# Patient Record
Sex: FEMALE | Race: WHITE | URBAN - METROPOLITAN AREA
[De-identification: names, ages, dates, MRNs, and addresses within clinical notes are randomized per-mention and may not be internally consistent; named-entity substitution may affect disease eponyms.]

---

## 2017-01-11 DIAGNOSIS — N82.0 VESICOVAGINAL FISTULA: Primary | ICD-10-CM

## 2017-01-16 ENCOUNTER — TELEPHONE (OUTPATIENT)
Dept: SURGERY | Facility: CLINIC | Age: 57
End: 2017-01-16

## 2017-01-20 ENCOUNTER — ANESTHESIA EVENT (OUTPATIENT)
Dept: SURGERY | Facility: CLINIC | Age: 57
DRG: 329 | End: 2017-01-20
Payer: COMMERCIAL

## 2017-01-23 ENCOUNTER — ALLIED HEALTH/NURSE VISIT (OUTPATIENT)
Dept: SURGERY | Facility: CLINIC | Age: 57
End: 2017-01-23

## 2017-01-23 RX ORDER — SODIUM CHLORIDE 9 MG/ML
1000 INJECTION, SOLUTION INTRAVENOUS DAILY PRN
COMMUNITY

## 2017-01-23 ASSESSMENT — LIFESTYLE VARIABLES: TOBACCO_USE: 1

## 2017-01-23 NOTE — MR AVS SNAPSHOT
After Visit Summary   1/23/2017    Tammy Borges    MRN: [id number]           Patient Information     Date Of Birth          1960        Visit Information        Provider Department      1/23/2017 2:00 PM Rn, [facility]        Care Instructions      Using an Incentive Spirometer  Soon after your surgery, a nurse or therapist will teach you breathing exercises. These keep your lungs clear, strengthen your breathing muscles, and help prevent complications.  The exercises include doing a deep-breathing exercise using a device called an incentive spirometer.  To do these exercises, you will breathe in through your mouth and not your nose. The incentive spirometer only works correctly if you breathe in through your mouth.  Four steps to clear lungs     Deep breathing expands the lungs, aids circulation, and helps prevent pneumonia.   1. Exhale normally.    Relax and breathe out.  2. Place your lips tightly around the mouthpiece.    Make sure the device is upright and not tilted.  3. Inhale as much air as you can through the mouthpiece (don't breath through your nose).    Inhale slowly and deeply.    Hold your breath long enough to keep the balls or disk raised for at least 3 seconds.    If you re inhaling too quickly, your device may make a tone. If you hear this tone, inhale more slowly.  4. Repeat the exercise regularly.    Do this exercise every hour while you're awake, or as your health care provider instructs.    You will also be taught coughing exercises and be asked to do them regularly on your own.    1926-5422 [employer]. [address] [place] [address]. All rights reserved. This information is not intended as a substitute for professional medical care. Always follow your healthcare professional's instructions.    AFTER YOUR SURGERY  Breathing exercises   Breathing exercises help you recover faster. Take deep breaths and let the air out  slowly. This will:     Help you wake up after surgery.    Help prevent complications like pneumonia.  Preventing complications will help you go home sooner.   We may give you a breathing device (incentive spirometer) to encourage you to breathe deeply.   Nausea and vomiting   You may feel sick to your stomach after surgery; if so, let your nurse know.    Pain control:  After surgery, you may have pain. Our goal is to help you manage your pain. Pain medicine will help you feel comfortable enough to do activities that will help you heal.  These activities may include breathing exercises, walking and physical therapy.   To help your health care team treat your pain we will ask: 1) If you have pain  2) where it is located 3) describe your pain in your words  Methods of pain control include medications given by mouth, vein or by nerve block for some surgeries.  We may give you a pain control pump that will:  1) Deliver the medicine through a tube placed in your vein  2) Control the amount of medicine you receive  3) Allow you to push a button to deliver a dose of pain medicine  Sequential Compression Device (SCD) or Pneumo Boots:  You may need to wear SCD S on your legs or feet. These are wraps connected to a machine that pumps in air and releases it. The repeated pumping helps prevent blood clots from forming.   Preparing for Your Surgery      Name:  Tammy Borges   MRN:  [id number]   :  1960   Today's Date:  2017     Arriving for surgery:  Surgery date: 17  Surgery time:  0730 AM  Arrival time:  0530 AM  Please come to:       [facility] [address] [place] [address]  [address]    [facility] parking is available in front of the hospital from 5:15 am to 8:00 pm    -  Stop at the Information Desk in the lobby    -   Inform the information person that you are here for surgery. An escort to 3c will be provided. If you would not like an escort, please proceed to 3C  on the 3rd floor. [phone]     What can I eat or drink?  -  You may have solid food or milk products until 8 hours prior to your surgery. 11 PM Tuesday  -  You may have water, apple juice or 7up/Sprite until 2 hours prior to your surgery. 0530 AM Wednesday    Which medicines can I take?  -  Do NOT take these medications in the morning, the day of surgery:  NA    -  Please take these medications the day of surgery:  Scheduled meds.    How do I prepare myself?  -  Take two showers: one the night before surgery; and one the morning of surgery.         Use Scrubcare or Hibiclens to wash from neck down.  You may use your own shampoo and conditioner. No other hair products.   -  Do NOT use lotion, powder, deodorant, or antiperspirant the day of your surgery.  -  Do NOT wear any makeup, fingernail polish or jewelry.  -  Begin using Incentive Spirometer 1 week prior to surgery.  Use 4 times per day, up to 5-10 breaths each time.  Bring Incentive Spirometer to hospital.  -Do not bring your own medications to the hospital, except for inhalers and eye drops.  -  Bring your ID and insurance card.    Questions or Concerns:  If you have questions or concerns, please call the  Preoperative Assessment Center, Monday-Friday 7AM-7PM:  [phone]                        Follow-ups after your visit        Your next 10 appointments already scheduled     Jan 23, 2017  3:30 PM   (Arrive by 3:15 PM)   PAC Anesthesia Consult with  Pac Anesthesiologist   [facility] Preoperative Assessment Center ([facility])    [address] [facility] [address] [facility]  4th [facility] [id number] [phone]            Jan 25, 2017   Procedure with Alban Coats MD   [facility] [place], Same Day Surgery (--)    [address] [facility] [id number] [phone]            Mar 06, 2017  8:30 AM   (Arrive by 8:15 AM)   Post-Op with Aman Collier DO   [facility] Inst for Prostate and Urologic Cancers ([facility])    [address] [facility] [facility]  4th [facility] [address] [phone]              Who to contact     Please call your clinic at [phone] to:    Ask questions about your health    Make or cancel appointments    Discuss your medicines    Learn about your test results    Speak to your doctor   If you have compliments or concerns about an experience at your clinic, or if you wish to file a complaint, please contact [facility] Patient Relations at [phone] or email us at [email or web address]         Additional Information About Your Visit        Vox[employer]ar[employer]oltServer Information     [employer] is an electronic gateway that provides easy, online access to your medical records. With uConnect, you can request a clinic appointment, read your test results, renew a prescription or communicate with your care team.     To sign up for uConnect visit the website at [email or web address]   You will be asked to enter the access code listed below, as well as some personal information. Please follow the directions to create your username and password.     Your access code is: [email or web address]  Expires: 2017  6:30 AM     Your access code will  in 90 days. If you need help or a new code, please contact your [facility] Clinic or call [phone] for assistance.        Care EveryWhere ID     This is your Care EveryWhere ID. This could be used by other organizations to access your [place] medical records  [id number]         Blood Pressure from Last 3 Encounters:   17 111/77   16 106/64   16 119/81    Weight from Last 3 Encounters:   17 101.061 kg (222 lb 12.8 oz)   16 96.616 kg (213 lb)   16 103.42 kg (228 lb)              Today, you had the following     No orders found for display       Primary Care Provider Office Phone # Fax #    Leonard Stovall MD, [phone]       [facility]  [address] [place] [address]        Thank you!     Thank you for choosing [facility]OPER[facility]  for your care. Our goal is always to provide you with excellent care. Hearing back from our patients is one way we can continue to improve our services. Please take a few minutes to complete the written survey that you may receive in the mail after your visit with us. Thank you!             Your Updated Medication List - Protect others around you: Learn how to safely use, store and throw away your medicines at www.disposemymeds.org.          This list is accurate as of: 1/23/17  3:09 PM.  Always use your most recent med list.                   Brand Name Dispense Instructions for use    acetaminophen 325 MG tablet    TYLENOL     Take 325 mg by mouth as needed       gabapentin 100 MG capsule    NEURONTIN     Take 100 mg by mouth 3 times daily as needed       LEVOTHYROXINE SODIUM PO      Take 275 mcg by mouth every morning       LOMOTIL PO      Take 2 tablets by mouth as needed       LORazepam 0.5 MG tablet    ATIVAN     Take 0.5 mg by mouth as needed       magnesium sulfate 500 mg/mL injection      as needed       * NaCl 0.9 % infusion      Inject 1,000 mLs into the vein daily as needed (added Magnesim, 500 mg IV by pharamist.)       * NaCl 0.9 % infusion      Inject 10 mLs into the vein every 8 hours       omeprazole 20 MG CR capsule    priLOSEC     Take 20 mg by mouth every morning       ZOFRAN PO      Take 4 mg by mouth every 6 hours as needed       * Notice:  This list has 2 medication(s) that are the same as other medications prescribed for you. Read the directions carefully, and ask your doctor or other care provider to review them with you.

## 2017-01-23 NOTE — PATIENT INSTRUCTIONS
Using an Incentive Spirometer  Soon after your surgery, a nurse or therapist will teach you breathing exercises. These keep your lungs clear, strengthen your breathing muscles, and help prevent complications.  The exercises include doing a deep-breathing exercise using a device called an incentive spirometer.  To do these exercises, you will breathe in through your mouth and not your nose. The incentive spirometer only works correctly if you breathe in through your mouth.  Four steps to clear lungs     Deep breathing expands the lungs, aids circulation, and helps prevent pneumonia.   1. Exhale normally.    Relax and breathe out.  2. Place your lips tightly around the mouthpiece.    Make sure the device is upright and not tilted.  3. Inhale as much air as you can through the mouthpiece (don't breath through your nose).    Inhale slowly and deeply.    Hold your breath long enough to keep the balls or disk raised for at least 3 seconds.    If you re inhaling too quickly, your device may make a tone. If you hear this tone, inhale more slowly.  4. Repeat the exercise regularly.    Do this exercise every hour while you're awake, or as your health care provider instructs.    You will also be taught coughing exercises and be asked to do them regularly on your own.    5103-6479 [employer]. [address] [place] [address], [place] [address]. All rights reserved. This information is not intended as a substitute for professional medical care. Always follow your healthcare professional's instructions.    AFTER YOUR SURGERY  Breathing exercises   Breathing exercises help you recover faster. Take deep breaths and let the air out slowly. This will:     Help you wake up after surgery.    Help prevent complications like pneumonia.  Preventing complications will help you go home sooner.   We may give you a breathing device (incentive spirometer) to encourage you to breathe deeply.   Nausea and vomiting   You may feel sick to  your stomach after surgery; if so, let your nurse know.    Pain control:  After surgery, you may have pain. Our goal is to help you manage your pain. Pain medicine will help you feel comfortable enough to do activities that will help you heal.  These activities may include breathing exercises, walking and physical therapy.   To help your health care team treat your pain we will ask: 1) If you have pain  2) where it is located 3) describe your pain in your words  Methods of pain control include medications given by mouth, vein or by nerve block for some surgeries.  We may give you a pain control pump that will:  1) Deliver the medicine through a tube placed in your vein  2) Control the amount of medicine you receive  3) Allow you to push a button to deliver a dose of pain medicine  Sequential Compression Device (SCD) or Pneumo Boots:  You may need to wear SCD S on your legs or feet. These are wraps connected to a machine that pumps in air and releases it. The repeated pumping helps prevent blood clots from forming.   Preparing for Your Surgery      Name:  Tammy Borges   MRN:  [id number]   :  1960   Today's Date:  2017     Arriving for surgery:  Surgery date: 17  Surgery time:  0730 AM  Arrival time:  0530 AM  Please come to:       [facility] [address] [place] [address]    -   parking is available in front of the hospital from 5:15 am to 8:00 pm    -  Stop at the Information Desk in the lobby    -   Inform the information person that you are here for surgery. An escort to  will be provided. If you would not like an escort, please proceed to  on the 3rd floor. [phone]     What can I eat or drink?  -  You may have solid food or milk products until 8 hours prior to your surgery. 11 PM Tuesday  -  You may have water, apple juice or 7up/Sprite until 2 hours prior to your surgery. 0530 AM Wednesday    Which medicines can I  take?  -  Do NOT take these medications in the morning, the day of surgery:  NA    -  Please take these medications the day of surgery:  Scheduled meds.    How do I prepare myself?  -  Take two showers: one the night before surgery; and one the morning of surgery.         Use Scrubcare or Hibiclens to wash from neck down.  You may use your own shampoo and conditioner. No other hair products.   -  Do NOT use lotion, powder, deodorant, or antiperspirant the day of your surgery.  -  Do NOT wear any makeup, fingernail polish or jewelry.  -  Begin using Incentive Spirometer 1 week prior to surgery.  Use 4 times per day, up to 5-10 breaths each time.  Bring Incentive Spirometer to hospital.  -Do not bring your own medications to the hospital, except for inhalers and eye drops.  -  Bring your ID and insurance card.    Questions or Concerns:  If you have questions or concerns, please call the  Preoperative Assessment Center, Monday-Friday 7AM-7PM:  [phone]

## 2017-01-23 NOTE — ANESTHESIA PREPROCEDURE EVALUATION
Anesthesia Evaluation     . Pt has had prior anesthetic. Type: General (WOKE UP DURING ONE SURERY)    History of anesthetic complications     ROS/MED HX    ENT/Pulmonary:     (+)ELLIOTT risk factors obese, tobacco use, Past use 1 ppd packs/day  , . .    Neurologic:  - neg neurologic ROS     Cardiovascular:     (+) ----. : . . . :. . Previous cardiac testing Echodate:see belowresults:date: results:ECG reviewed date: results: date: results:         (-) hypertension, taking anticoagulants/antiplatelets, syncope and dyslipidemia   METS/Exercise Tolerance: Comment: Can walk 1 mile if hydrated >4 METS   Hematologic:     (+) History of blood clots pt is not anticoagulated, History of Transfusion no previous transfusion reaction Other Hematologic Disorder-hx of PICC assoc DVT      Musculoskeletal:  - neg musculoskeletal ROS       GI/Hepatic:     (+) GERD Asymptomatic on medication, Other GI/Hepatic High output jejunostomy      Renal/Genitourinary:     (+) chronic renal disease, type: CRI,       Endo:     (+) thyroid problem hypothyroidism, Obesity, .      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        Malignancy:   (+) Malignancy History of Other  Other CA bladder cancer Remission status post Surgery         Other:    (+) No chance of pregnancy C-spine cleared: N/A, no other significant disability              Physical Exam      Airway   Mallampati: I  TM distance: >3 FB  Neck ROM: full    Dental   (+) chipped  Comment: Left lower chipped tooth    Cardiovascular   Rhythm and rate: regular and normal      Pulmonary    breath sounds clear to auscultation    Other findings:   Echocardiogram 12/2016  1. Left ventricular ejection fraction is 60 to 65%. LV cavity size is normal. Systolic function is normal with no obvious regional wall motion abnormalities noted.  No valvular problems.    EKG 10/2016  Sinus  tach: Heart Rate: 124  QTc 383ms    [place] Pouch with skin irritation.  Has jejunostomy pouch.       1/24/2017 11:54  Sodium: 141  Potassium: 3.4  Chloride: 106  Carbon Dioxide: 30  Urea Nitrogen: 28  Creatinine: 1.62 (H)  GFR Estimate: 33 (L)  GFR Estimate If Black: 40 (L)  Calcium: 8.7  Anion Gap: 6  Magnesium: 2.6 (H)  T4 Free: 1.17  TSH: 4.95 (H)  Glucose: 88    WBC: 6.9  Hemoglobin: 13.4  Hematocrit: 40.7  Platelet Count: 252  RBC Count: 4.57  MCV: 89  MCH: 29.3  MCHC: 32.9  RDW: 14.6  ABO: Pending  RH(D): Pending  Antibody Screen: Pending  Test Valid Only At: [facility]  Specimen Expires: 01/27/2017 1/24/2017 11:59  (from [place] Pouch)  Color Urine: Yellow  Appearance Urine: Cloudy  Glucose Urine: Negative  Bilirubin Urine: Negative  Ketones Urine: Negative  Specific Gravity Urine: 1.014  pH Urine: 6.0  Protein Albumin Urine: 100 (A)  Urobilinogen mg/dL: 0.0  Nitrite Urine: Negative  Blood Urine: Moderate (A)  Leukocyte Esterase Urine: Large (A)  Source: Midstream Urine  WBC Urine: >182 (H)  RBC Urine: 43 (H)  Bacteria Urine: Many (A)  WBC Clumps: Present (A)  Transitional Epi: 7 (A)  Mucous Urine: Present (A)  Specimen Description: Midstream Urine  Culture Micro: Pending             PAC Discussion and Assessment    ASA Classification: 3  Case is suitable for:   Anesthetic techniques and relevant risks discussed: GA with regional block for post-op pain control  Invasive monitoring and risk discussed: Yes  Types:   Possibility and Risk of blood transfusion discussed: Yes  NPO instructions given:   Additional anesthetic preparation and risks discussed:   Needs early admission to pre-op area:   Other:     PAC Resident/NP Anesthesia Assessment:  Scheduled for Exploratory Laparotomy, Takedown Jejunostomy, Cholecystectomy, Ventral Hernia Repair with Mesh, Looposcopy Possible Cystogram Possible Repair Vesicovaginal Fistula Anesthesia General with block on 1/25/17 by Dr. Smith in treatment of  "vesicovaginal fistula.  PAC referral for risk assessment and optimization for anesthesia with comorbid conditions of:    56 y.o. Diagnosed with stage III bladder cancer 2011.  Had [place] cyst[place] in [place].  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  Treated but became colonized.  In 2014, had severe infection due to CRE and was in coma x 30 days.  Had bowel surgery and jejunostomy placed but \"woke up during surgery\".  Did not feel pain but now \"terrified\" of anesthesia.  Recurring problems with dehydration, metabolic acidosis due to high output jejunostomy.  Has PICC line and gives self ~ 1 left NS + magnesium per day.     Has left arm PICC line.     Pre-operative considerations:  1.  Cardiac:  Functional status good.  Can walk 1 mile.  Cardiac echocardiogram 12/2016:  EF 60-65%.  No valvular abnormalities.  0.9%  risk of major adverse cardiac event.  EKG requested from ND.   2.  Pulm:  Airway feasible.  Former smoker. ELLIOTT risk: low  3.  GI:  Risk of PONV score = 2.  If > 2, anti-emetic intervention recommended.  GERD, compensated on PPI.  History of cholecystitis.    4.  :  CKD stage III-IV.  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  Followed by nephrology (PMD).  Avoid nephrotoxins.  Has [place] Pouch (adis-pouch irritation --> urology made aware).  Does self cath every 4 hours.   History of chronic metabolic acidosis due to jejunostomy.    5.  Psych:  + anxiety  6.  Endo:  + Hahimotos thyroiditis.  On high dose synthroid.  TSH chronically elevated with normal T4 (per Care Everywhere)  7.  Heme:  History of catheter assoc DVT (PICC line) in past.  No longer on anticoagulation.      VTE risk:     Patient is optimized and is acceptable candidate for the proposed procedure.  No further diagnostic evaluation is needed.     Patient also evaluated by Dr. Lin. See recommendations below.           Reviewed and Signed by PAC Mid-Level Provider/Resident  Mid-Level Provider/Resident: Josseline Doyle PA-C  Date:   Time: "     Attending Anesthesiologist Anesthesia Assessment:  56 year old for extensive ex lap, takedown jejunostomy, cholecystectomy, and management of multiple vesicovaginal fistulas. Chart reviewed, patient seen and evaluated; agree with above assessment. Patient had radical cystectomy 2011, became colonized with CRE, then had septic episode. During one of her surgeries had clear episode of awareness (could see light, but was completely paralyzed, had no pain but did have distress. Probably 20-30 seconds of awareness, but she clearly is traumatized by this. We had a long discussion about ways to avoid awareness this time. We also discussed epidural for postop pain management and patient very much would like this as she is very anxious about pain and especially likes the duration possible with epidural. No significant cardiac or pulmonary disease.     Patient is appropriate for the planned procedure without further workup or medical management change. The final anesthesia plan will be determined by the physician anesthesiologist caring for the patient on the day of surgery.    Reviewed and Signed by PAC Anesthesiologist  Anesthesiologist: Felicitas Lin MD  Date: 1/23/2017  Time:   Pass/Fail: Pass  Disposition:     PAC Pharmacist Assessment:        Pharmacist:   Date:   Time:      Anesthesia Plan      History & Physical Review  History and physical reviewed and following examination; no interval change.    ASA Status:  3 .    NPO Status:  > 8 hours    Plan for General, ETT and Epidural with Intravenous and Propofol induction. Maintenance will be Balanced.    PONV prophylaxis:  Ondansetron (or other 5HT-3) and Dexamethasone or Solumedrol  Additional equipment: 2nd IV and Arterial Line      Postoperative Care  Postoperative pain management:  IV analgesics, Multi-modal analgesia and Neuraxial analgesia.      Consents  Anesthetic plan, risks, benefits and alternatives discussed with:  Patient.  Use of blood products  discussed: Yes.   Consented to blood products.  .        Anesthesia plan was reviewed and discussed with resident. Anesthesia plan was also discussed in detail with patient . She understood and agreed to proceed as planned. All questions answered    Barrett Escobar MD

## 2017-01-24 DIAGNOSIS — E06.3 HASHIMOTO'S DISEASE: ICD-10-CM

## 2017-01-24 DIAGNOSIS — N82.0 VESICOVAGINAL FISTULA: ICD-10-CM

## 2017-01-24 DIAGNOSIS — N82.0 VVF (VESICOVAGINAL FISTULA): ICD-10-CM

## 2017-01-24 DIAGNOSIS — E83.42 HYPOMAGNESEMIA: ICD-10-CM

## 2017-01-24 LAB
ABO + RH BLD: NORMAL
ABO + RH BLD: NORMAL
ALBUMIN UR-MCNC: 100 MG/DL
ANION GAP SERPL CALCULATED.3IONS-SCNC: 6 MMOL/L (ref 3–14)
APPEARANCE UR: ABNORMAL
BACTERIA #/AREA URNS HPF: ABNORMAL /HPF
BILIRUB UR QL STRIP: NEGATIVE
BLD GP AB SCN SERPL QL: NORMAL
BLOOD BANK CMNT PATIENT-IMP: NORMAL
BUN SERPL-MCNC: 28 MG/DL (ref 7–30)
CALCIUM SERPL-MCNC: 8.7 MG/DL (ref 8.5–10.1)
CHLORIDE SERPL-SCNC: 106 MMOL/L (ref 94–109)
CO2 SERPL-SCNC: 30 MMOL/L (ref 20–32)
COLOR UR AUTO: YELLOW
CREAT SERPL-MCNC: 1.62 MG/DL (ref 0.52–1.04)
ERYTHROCYTE [DISTWIDTH] IN BLOOD BY AUTOMATED COUNT: 14.6 % (ref 10–15)
GFR SERPL CREATININE-BSD FRML MDRD: 33 ML/MIN/1.7M2
GLUCOSE SERPL-MCNC: 88 MG/DL (ref 70–99)
GLUCOSE UR STRIP-MCNC: NEGATIVE MG/DL
HCG SERPL QL: NEGATIVE
HCT VFR BLD AUTO: 40.7 % (ref 35–47)
HGB BLD-MCNC: 13.4 G/DL (ref 11.7–15.7)
HGB UR QL STRIP: ABNORMAL
KETONES UR STRIP-MCNC: NEGATIVE MG/DL
LEUKOCYTE ESTERASE UR QL STRIP: ABNORMAL
MAGNESIUM SERPL-MCNC: 2.6 MG/DL (ref 1.6–2.3)
MCH RBC QN AUTO: 29.3 PG (ref 26.5–33)
MCHC RBC AUTO-ENTMCNC: 32.9 G/DL (ref 31.5–36.5)
MCV RBC AUTO: 89 FL (ref 78–100)
MUCOUS THREADS #/AREA URNS LPF: PRESENT /LPF
NITRATE UR QL: NEGATIVE
PH UR STRIP: 6 PH (ref 5–7)
PLATELET # BLD AUTO: 252 10E9/L (ref 150–450)
POTASSIUM SERPL-SCNC: 3.4 MMOL/L (ref 3.4–5.3)
RBC # BLD AUTO: 4.57 10E12/L (ref 3.8–5.2)
RBC #/AREA URNS AUTO: 43 /HPF (ref 0–2)
SODIUM SERPL-SCNC: 141 MMOL/L (ref 133–144)
SP GR UR STRIP: 1.01 (ref 1–1.03)
SPECIMEN EXP DATE BLD: NORMAL
T4 FREE SERPL-MCNC: 1.17 NG/DL (ref 0.76–1.46)
TRANS CELLS #/AREA URNS HPF: 7 /HPF
TSH SERPL DL<=0.005 MIU/L-ACNC: 4.95 MU/L (ref 0.4–4)
URN SPEC COLLECT METH UR: ABNORMAL
UROBILINOGEN UR STRIP-MCNC: 0 MG/DL (ref 0–2)
WBC # BLD AUTO: 6.9 10E9/L (ref 4–11)
WBC #/AREA URNS AUTO: >182 /HPF (ref 0–2)
WBC CLUMPS #/AREA URNS HPF: PRESENT /HPF

## 2017-01-25 ENCOUNTER — ANESTHESIA (OUTPATIENT)
Dept: SURGERY | Facility: CLINIC | Age: 57
DRG: 329 | End: 2017-01-25
Payer: COMMERCIAL

## 2017-01-25 ENCOUNTER — HOSPITAL ENCOUNTER (INPATIENT)
Facility: CLINIC | Age: 57
LOS: 16 days | Discharge: HOME HEALTH | DRG: 329 | End: 2017-02-10
Attending: SURGERY | Admitting: SURGERY
Payer: COMMERCIAL

## 2017-01-25 DIAGNOSIS — K43.9 VENTRAL HERNIA WITHOUT OBSTRUCTION OR GANGRENE: Primary | ICD-10-CM

## 2017-01-25 DIAGNOSIS — K91.89 SMALL BOWEL ANASTOMOTIC LEAK: ICD-10-CM

## 2017-01-25 LAB
APTT PPP: 27 SEC (ref 22–37)
BASE DEFICIT BLDA-SCNC: 2.2 MMOL/L
BASE EXCESS BLDA CALC-SCNC: 0.3 MMOL/L
BASE EXCESS BLDA CALC-SCNC: 0.5 MMOL/L
CA-I BLD-MCNC: 4.6 MG/DL (ref 4.4–5.2)
CA-I BLD-MCNC: 4.8 MG/DL (ref 4.4–5.2)
CA-I BLD-MCNC: 4.8 MG/DL (ref 4.4–5.2)
FIBRINOGEN PPP-MCNC: 361 MG/DL (ref 200–420)
FUNGUS SPEC CULT: NORMAL
GLUCOSE BLD-MCNC: 108 MG/DL (ref 70–99)
GLUCOSE BLD-MCNC: 109 MG/DL (ref 70–99)
GLUCOSE BLD-MCNC: 112 MG/DL (ref 70–99)
HCO3 BLD-SCNC: 23 MMOL/L (ref 21–28)
HCO3 BLD-SCNC: 26 MMOL/L (ref 21–28)
HCO3 BLD-SCNC: 26 MMOL/L (ref 21–28)
HGB BLD-MCNC: 12.9 G/DL (ref 11.7–15.7)
HGB BLD-MCNC: 12.9 G/DL (ref 11.7–15.7)
HGB BLD-MCNC: 13.4 G/DL (ref 11.7–15.7)
INR PPP: 1.1 (ref 0.86–1.14)
LACTATE BLD-SCNC: 2.8 MMOL/L (ref 0.7–2.1)
LACTATE BLD-SCNC: 3 MMOL/L (ref 0.7–2.1)
LACTATE BLD-SCNC: 3.1 MMOL/L (ref 0.7–2.1)
MICRO REPORT STATUS: NORMAL
O2/TOTAL GAS SETTING VFR VENT: 60 %
PCO2 BLD: 42 MM HG (ref 35–45)
PCO2 BLD: 44 MM HG (ref 35–45)
PCO2 BLD: 44 MM HG (ref 35–45)
PH BLD: 7.35 PH (ref 7.35–7.45)
PH BLD: 7.37 PH (ref 7.35–7.45)
PH BLD: 7.37 PH (ref 7.35–7.45)
PLATELET # BLD AUTO: 200 10E9/L (ref 150–450)
PLATELET # BLD AUTO: NORMAL 10E9/L (ref 150–450)
PO2 BLD: 106 MM HG (ref 80–105)
PO2 BLD: 86 MM HG (ref 80–105)
PO2 BLD: 96 MM HG (ref 80–105)
POTASSIUM BLD-SCNC: 2.8 MMOL/L (ref 3.4–5.3)
POTASSIUM BLD-SCNC: 3 MMOL/L (ref 3.4–5.3)
POTASSIUM BLD-SCNC: 3.9 MMOL/L (ref 3.4–5.3)
SODIUM BLD-SCNC: 138 MMOL/L (ref 133–144)
SODIUM BLD-SCNC: 140 MMOL/L (ref 133–144)
SODIUM BLD-SCNC: 141 MMOL/L (ref 133–144)
SPECIMEN SOURCE: NORMAL

## 2017-01-25 PROCEDURE — 12000003 ZZH R&B CRITICAL UMMC

## 2017-01-25 PROCEDURE — 25000128 H RX IP 250 OP 636: Performed by: SURGERY

## 2017-01-25 PROCEDURE — 87070 CULTURE OTHR SPECIMN AEROBIC: CPT | Performed by: SURGERY

## 2017-01-25 PROCEDURE — 82803 BLOOD GASES ANY COMBINATION: CPT | Performed by: ANESTHESIOLOGY

## 2017-01-25 PROCEDURE — 40000171 ZZH STATISTIC PRE-PROCEDURE ASSESSMENT III: Performed by: SURGERY

## 2017-01-25 PROCEDURE — 25000125 ZZHC RX 250: Performed by: STUDENT IN AN ORGANIZED HEALTH CARE EDUCATION/TRAINING PROGRAM

## 2017-01-25 PROCEDURE — 0JB80ZZ EXCISION OF ABDOMEN SUBCUTANEOUS TISSUE AND FASCIA, OPEN APPROACH: ICD-10-PCS | Performed by: SURGERY

## 2017-01-25 PROCEDURE — 25000565 ZZH ISOFLURANE, EA 15 MIN: Performed by: SURGERY

## 2017-01-25 PROCEDURE — 85049 AUTOMATED PLATELET COUNT: CPT | Performed by: SURGERY

## 2017-01-25 PROCEDURE — 0FT40ZZ RESECTION OF GALLBLADDER, OPEN APPROACH: ICD-10-PCS | Performed by: SURGERY

## 2017-01-25 PROCEDURE — 07BC0ZX EXCISION OF PELVIS LYMPHATIC, OPEN APPROACH, DIAGNOSTIC: ICD-10-PCS | Performed by: SURGERY

## 2017-01-25 PROCEDURE — 85049 AUTOMATED PLATELET COUNT: CPT | Performed by: STUDENT IN AN ORGANIZED HEALTH CARE EDUCATION/TRAINING PROGRAM

## 2017-01-25 PROCEDURE — 85730 THROMBOPLASTIN TIME PARTIAL: CPT | Performed by: SURGERY

## 2017-01-25 PROCEDURE — 0DBS0ZX EXCISION OF GREATER OMENTUM, OPEN APPROACH, DIAGNOSTIC: ICD-10-PCS | Performed by: SURGERY

## 2017-01-25 PROCEDURE — 87176 TISSUE HOMOGENIZATION CULTR: CPT | Performed by: SURGERY

## 2017-01-25 PROCEDURE — 25800025 ZZH RX 258: Performed by: STUDENT IN AN ORGANIZED HEALTH CARE EDUCATION/TRAINING PROGRAM

## 2017-01-25 PROCEDURE — 84132 ASSAY OF SERUM POTASSIUM: CPT | Performed by: ANESTHESIOLOGY

## 2017-01-25 PROCEDURE — 93005 ELECTROCARDIOGRAM TRACING: CPT | Performed by: OPTOMETRIST

## 2017-01-25 PROCEDURE — 83605 ASSAY OF LACTIC ACID: CPT | Performed by: ANESTHESIOLOGY

## 2017-01-25 PROCEDURE — 71000015 ZZH RECOVERY PHASE 1 LEVEL 2 EA ADDTL HR: Performed by: SURGERY

## 2017-01-25 PROCEDURE — 88302 TISSUE EXAM BY PATHOLOGIST: CPT | Performed by: SURGERY

## 2017-01-25 PROCEDURE — 0DBB0ZZ EXCISION OF ILEUM, OPEN APPROACH: ICD-10-PCS | Performed by: SURGERY

## 2017-01-25 PROCEDURE — 27210794 ZZH OR GENERAL SUPPLY STERILE: Performed by: SURGERY

## 2017-01-25 PROCEDURE — 36415 COLL VENOUS BLD VENIPUNCTURE: CPT | Performed by: SURGERY

## 2017-01-25 PROCEDURE — 71000014 ZZH RECOVERY PHASE 1 LEVEL 2 FIRST HR: Performed by: SURGERY

## 2017-01-25 PROCEDURE — 25000125 ZZHC RX 250: Performed by: SURGERY

## 2017-01-25 PROCEDURE — 88307 TISSUE EXAM BY PATHOLOGIST: CPT | Performed by: SURGERY

## 2017-01-25 PROCEDURE — 87102 FUNGUS ISOLATION CULTURE: CPT | Performed by: SURGERY

## 2017-01-25 PROCEDURE — 36000070 ZZH SURGERY LEVEL 5 EA 15 ADDTL MIN - UMMC: Performed by: SURGERY

## 2017-01-25 PROCEDURE — 82947 ASSAY GLUCOSE BLOOD QUANT: CPT | Performed by: ANESTHESIOLOGY

## 2017-01-25 PROCEDURE — 0TNB0ZZ RELEASE BLADDER, OPEN APPROACH: ICD-10-PCS | Performed by: UROLOGY

## 2017-01-25 PROCEDURE — 25000125 ZZHC RX 250: Performed by: ANESTHESIOLOGY

## 2017-01-25 PROCEDURE — 87015 SPECIMEN INFECT AGNT CONCNTJ: CPT | Performed by: SURGERY

## 2017-01-25 PROCEDURE — 25000125 ZZHC RX 250: Performed by: NURSE ANESTHETIST, CERTIFIED REGISTERED

## 2017-01-25 PROCEDURE — 87205 SMEAR GRAM STAIN: CPT | Performed by: SURGERY

## 2017-01-25 PROCEDURE — 0JN80ZZ RELEASE ABDOMEN SUBCUTANEOUS TISSUE AND FASCIA, OPEN APPROACH: ICD-10-PCS | Performed by: SURGERY

## 2017-01-25 PROCEDURE — 84295 ASSAY OF SERUM SODIUM: CPT | Performed by: ANESTHESIOLOGY

## 2017-01-25 PROCEDURE — 0WQF0ZZ REPAIR ABDOMINAL WALL, OPEN APPROACH: ICD-10-PCS | Performed by: SURGERY

## 2017-01-25 PROCEDURE — 88331 PATH CONSLTJ SURG 1 BLK 1SPC: CPT | Performed by: SURGERY

## 2017-01-25 PROCEDURE — 82330 ASSAY OF CALCIUM: CPT | Performed by: ANESTHESIOLOGY

## 2017-01-25 PROCEDURE — 40000275 ZZH STATISTIC RCP TIME EA 10 MIN: Performed by: OPTOMETRIST

## 2017-01-25 PROCEDURE — 87206 SMEAR FLUORESCENT/ACID STAI: CPT | Performed by: SURGERY

## 2017-01-25 PROCEDURE — 85384 FIBRINOGEN ACTIVITY: CPT | Performed by: SURGERY

## 2017-01-25 PROCEDURE — 88305 TISSUE EXAM BY PATHOLOGIST: CPT | Performed by: SURGERY

## 2017-01-25 PROCEDURE — 25000128 H RX IP 250 OP 636: Performed by: STUDENT IN AN ORGANIZED HEALTH CARE EDUCATION/TRAINING PROGRAM

## 2017-01-25 PROCEDURE — 37000008 ZZH ANESTHESIA TECHNICAL FEE, 1ST 30 MIN: Performed by: SURGERY

## 2017-01-25 PROCEDURE — 25000128 H RX IP 250 OP 636: Performed by: ANESTHESIOLOGY

## 2017-01-25 PROCEDURE — 40000014 ZZH STATISTIC ARTERIAL MONITORING DAILY

## 2017-01-25 PROCEDURE — 36000068 ZZH SURGERY LEVEL 5 1ST 30 MIN - UMMC: Performed by: SURGERY

## 2017-01-25 PROCEDURE — 40000275 ZZH STATISTIC RCP TIME EA 10 MIN

## 2017-01-25 PROCEDURE — 85610 PROTHROMBIN TIME: CPT | Performed by: SURGERY

## 2017-01-25 PROCEDURE — 87116 MYCOBACTERIA CULTURE: CPT | Performed by: SURGERY

## 2017-01-25 PROCEDURE — P9041 ALBUMIN (HUMAN),5%, 50ML: HCPCS | Performed by: STUDENT IN AN ORGANIZED HEALTH CARE EDUCATION/TRAINING PROGRAM

## 2017-01-25 PROCEDURE — 27110028 ZZH OR GENERAL SUPPLY NON-STERILE: Performed by: SURGERY

## 2017-01-25 PROCEDURE — 36416 COLLJ CAPILLARY BLOOD SPEC: CPT | Performed by: STUDENT IN AN ORGANIZED HEALTH CARE EDUCATION/TRAINING PROGRAM

## 2017-01-25 PROCEDURE — 0DBV0ZX EXCISION OF MESENTERY, OPEN APPROACH, DIAGNOSTIC: ICD-10-PCS | Performed by: SURGERY

## 2017-01-25 PROCEDURE — 87075 CULTR BACTERIA EXCEPT BLOOD: CPT | Performed by: SURGERY

## 2017-01-25 PROCEDURE — 37000009 ZZH ANESTHESIA TECHNICAL FEE, EACH ADDTL 15 MIN: Performed by: SURGERY

## 2017-01-25 PROCEDURE — 88304 TISSUE EXAM BY PATHOLOGIST: CPT | Performed by: SURGERY

## 2017-01-25 RX ORDER — ERTAPENEM 1 G/1
1 INJECTION, POWDER, LYOPHILIZED, FOR SOLUTION INTRAMUSCULAR; INTRAVENOUS ONCE
Status: COMPLETED | OUTPATIENT
Start: 2017-01-25 | End: 2017-01-25

## 2017-01-25 RX ORDER — HEPARIN SODIUM 5000 [USP'U]/.5ML
5000 INJECTION, SOLUTION INTRAVENOUS; SUBCUTANEOUS EVERY 8 HOURS
Status: DISCONTINUED | OUTPATIENT
Start: 2017-01-26 | End: 2017-02-10 | Stop reason: HOSPADM

## 2017-01-25 RX ORDER — ONDANSETRON 4 MG/1
4 TABLET, ORALLY DISINTEGRATING ORAL EVERY 6 HOURS PRN
Status: DISCONTINUED | OUTPATIENT
Start: 2017-01-25 | End: 2017-02-10 | Stop reason: HOSPADM

## 2017-01-25 RX ORDER — FENTANYL CITRATE 50 UG/ML
25-50 INJECTION, SOLUTION INTRAMUSCULAR; INTRAVENOUS
Status: DISCONTINUED | OUTPATIENT
Start: 2017-01-25 | End: 2017-01-25 | Stop reason: HOSPADM

## 2017-01-25 RX ORDER — POTASSIUM CHLORIDE 7.45 MG/ML
INJECTION INTRAVENOUS PRN
Status: DISCONTINUED | OUTPATIENT
Start: 2017-01-25 | End: 2017-01-25

## 2017-01-25 RX ORDER — ONDANSETRON 2 MG/ML
4 INJECTION INTRAMUSCULAR; INTRAVENOUS EVERY 30 MIN PRN
Status: DISCONTINUED | OUTPATIENT
Start: 2017-01-25 | End: 2017-01-25 | Stop reason: HOSPADM

## 2017-01-25 RX ORDER — SODIUM CHLORIDE, SODIUM LACTATE, POTASSIUM CHLORIDE, CALCIUM CHLORIDE 600; 310; 30; 20 MG/100ML; MG/100ML; MG/100ML; MG/100ML
INJECTION, SOLUTION INTRAVENOUS CONTINUOUS PRN
Status: DISCONTINUED | OUTPATIENT
Start: 2017-01-25 | End: 2017-01-25

## 2017-01-25 RX ORDER — GLYCOPYRROLATE 0.2 MG/ML
INJECTION, SOLUTION INTRAMUSCULAR; INTRAVENOUS PRN
Status: DISCONTINUED | OUTPATIENT
Start: 2017-01-25 | End: 2017-01-25

## 2017-01-25 RX ORDER — NALBUPHINE HYDROCHLORIDE 10 MG/ML
2.5-5 INJECTION, SOLUTION INTRAMUSCULAR; INTRAVENOUS; SUBCUTANEOUS EVERY 6 HOURS PRN
Status: DISCONTINUED | OUTPATIENT
Start: 2017-01-25 | End: 2017-01-25

## 2017-01-25 RX ORDER — CEFAZOLIN SODIUM 2 G/100ML
2 INJECTION, SOLUTION INTRAVENOUS
Status: COMPLETED | OUTPATIENT
Start: 2017-01-25 | End: 2017-01-25

## 2017-01-25 RX ORDER — SODIUM CHLORIDE, SODIUM LACTATE, POTASSIUM CHLORIDE, CALCIUM CHLORIDE 600; 310; 30; 20 MG/100ML; MG/100ML; MG/100ML; MG/100ML
INJECTION, SOLUTION INTRAVENOUS CONTINUOUS
Status: DISCONTINUED | OUTPATIENT
Start: 2017-01-25 | End: 2017-02-02

## 2017-01-25 RX ORDER — SODIUM CHLORIDE, SODIUM LACTATE, POTASSIUM CHLORIDE, CALCIUM CHLORIDE 600; 310; 30; 20 MG/100ML; MG/100ML; MG/100ML; MG/100ML
INJECTION, SOLUTION INTRAVENOUS CONTINUOUS
Status: DISCONTINUED | OUTPATIENT
Start: 2017-01-25 | End: 2017-01-25 | Stop reason: HOSPADM

## 2017-01-25 RX ORDER — PROCHLORPERAZINE 25 MG
25 SUPPOSITORY, RECTAL RECTAL EVERY 12 HOURS PRN
Status: DISCONTINUED | OUTPATIENT
Start: 2017-01-25 | End: 2017-02-10 | Stop reason: HOSPADM

## 2017-01-25 RX ORDER — HYDRALAZINE HYDROCHLORIDE 20 MG/ML
2.5-5 INJECTION INTRAMUSCULAR; INTRAVENOUS EVERY 10 MIN PRN
Status: DISCONTINUED | OUTPATIENT
Start: 2017-01-25 | End: 2017-01-25 | Stop reason: HOSPADM

## 2017-01-25 RX ORDER — FENTANYL CITRATE 50 UG/ML
INJECTION, SOLUTION INTRAMUSCULAR; INTRAVENOUS PRN
Status: DISCONTINUED | OUTPATIENT
Start: 2017-01-25 | End: 2017-01-25

## 2017-01-25 RX ORDER — PROPOFOL 10 MG/ML
INJECTION, EMULSION INTRAVENOUS PRN
Status: DISCONTINUED | OUTPATIENT
Start: 2017-01-25 | End: 2017-01-25

## 2017-01-25 RX ORDER — EPHEDRINE SULFATE 50 MG/ML
INJECTION, SOLUTION INTRAMUSCULAR; INTRAVENOUS; SUBCUTANEOUS PRN
Status: DISCONTINUED | OUTPATIENT
Start: 2017-01-25 | End: 2017-01-25

## 2017-01-25 RX ORDER — LEVOTHYROXINE SODIUM ANHYDROUS 100 UG/5ML
125 INJECTION, POWDER, LYOPHILIZED, FOR SOLUTION INTRAVENOUS EVERY MORNING
Status: DISCONTINUED | OUTPATIENT
Start: 2017-01-26 | End: 2017-02-01

## 2017-01-25 RX ORDER — MORPHINE SULFATE 2 MG/ML
2-4 INJECTION, SOLUTION INTRAMUSCULAR; INTRAVENOUS EVERY 5 MIN PRN
Status: DISCONTINUED | OUTPATIENT
Start: 2017-01-25 | End: 2017-01-25 | Stop reason: HOSPADM

## 2017-01-25 RX ORDER — NALOXONE HYDROCHLORIDE 0.4 MG/ML
.1-.4 INJECTION, SOLUTION INTRAMUSCULAR; INTRAVENOUS; SUBCUTANEOUS
Status: DISCONTINUED | OUTPATIENT
Start: 2017-01-25 | End: 2017-02-10 | Stop reason: HOSPADM

## 2017-01-25 RX ORDER — DIPHENHYDRAMINE HYDROCHLORIDE 50 MG/ML
25 INJECTION INTRAMUSCULAR; INTRAVENOUS EVERY 6 HOURS PRN
Status: DISCONTINUED | OUTPATIENT
Start: 2017-01-25 | End: 2017-02-10 | Stop reason: HOSPADM

## 2017-01-25 RX ORDER — NALOXONE HYDROCHLORIDE 0.4 MG/ML
.1-.4 INJECTION, SOLUTION INTRAMUSCULAR; INTRAVENOUS; SUBCUTANEOUS
Status: DISCONTINUED | OUTPATIENT
Start: 2017-01-25 | End: 2017-01-25

## 2017-01-25 RX ORDER — LIDOCAINE HYDROCHLORIDE 10 MG/ML
INJECTION, SOLUTION EPIDURAL; INFILTRATION; INTRACAUDAL; PERINEURAL PRN
Status: DISCONTINUED | OUTPATIENT
Start: 2017-01-25 | End: 2017-01-25

## 2017-01-25 RX ORDER — HEPARIN SODIUM 1000 [USP'U]/ML
INJECTION, SOLUTION INTRAVENOUS; SUBCUTANEOUS PRN
Status: DISCONTINUED | OUTPATIENT
Start: 2017-01-25 | End: 2017-01-25

## 2017-01-25 RX ORDER — FLUMAZENIL 0.1 MG/ML
0.2 INJECTION, SOLUTION INTRAVENOUS
Status: DISCONTINUED | OUTPATIENT
Start: 2017-01-25 | End: 2017-01-25 | Stop reason: HOSPADM

## 2017-01-25 RX ORDER — ONDANSETRON 4 MG/1
4 TABLET, ORALLY DISINTEGRATING ORAL EVERY 30 MIN PRN
Status: DISCONTINUED | OUTPATIENT
Start: 2017-01-25 | End: 2017-01-25 | Stop reason: HOSPADM

## 2017-01-25 RX ORDER — NALOXONE HYDROCHLORIDE 0.4 MG/ML
.1-.4 INJECTION, SOLUTION INTRAMUSCULAR; INTRAVENOUS; SUBCUTANEOUS
Status: DISCONTINUED | OUTPATIENT
Start: 2017-01-25 | End: 2017-01-25 | Stop reason: HOSPADM

## 2017-01-25 RX ORDER — BUPIVACAINE HYDROCHLORIDE 2.5 MG/ML
INJECTION, SOLUTION EPIDURAL; INFILTRATION; INTRACAUDAL PRN
Status: DISCONTINUED | OUTPATIENT
Start: 2017-01-25 | End: 2017-01-25

## 2017-01-25 RX ORDER — ONDANSETRON 2 MG/ML
4 INJECTION INTRAMUSCULAR; INTRAVENOUS EVERY 6 HOURS PRN
Status: DISCONTINUED | OUTPATIENT
Start: 2017-01-25 | End: 2017-02-10 | Stop reason: HOSPADM

## 2017-01-25 RX ORDER — NALBUPHINE HYDROCHLORIDE 10 MG/ML
2.5-5 INJECTION, SOLUTION INTRAMUSCULAR; INTRAVENOUS; SUBCUTANEOUS EVERY 6 HOURS PRN
Status: DISCONTINUED | OUTPATIENT
Start: 2017-01-25 | End: 2017-01-26

## 2017-01-25 RX ORDER — ERTAPENEM 1 G/1
1 INJECTION, POWDER, LYOPHILIZED, FOR SOLUTION INTRAMUSCULAR; INTRAVENOUS EVERY 24 HOURS
Status: DISCONTINUED | OUTPATIENT
Start: 2017-01-25 | End: 2017-01-25

## 2017-01-25 RX ORDER — PROCHLORPERAZINE MALEATE 5 MG
5-10 TABLET ORAL EVERY 6 HOURS PRN
Status: DISCONTINUED | OUTPATIENT
Start: 2017-01-25 | End: 2017-02-10 | Stop reason: HOSPADM

## 2017-01-25 RX ORDER — NEOSTIGMINE METHYLSULFATE 1 MG/ML
VIAL (ML) INJECTION PRN
Status: DISCONTINUED | OUTPATIENT
Start: 2017-01-25 | End: 2017-01-25

## 2017-01-25 RX ORDER — LIDOCAINE 40 MG/G
CREAM TOPICAL
Status: DISCONTINUED | OUTPATIENT
Start: 2017-01-25 | End: 2017-02-10 | Stop reason: HOSPADM

## 2017-01-25 RX ORDER — ALBUMIN, HUMAN INJ 5% 5 %
SOLUTION INTRAVENOUS CONTINUOUS PRN
Status: DISCONTINUED | OUTPATIENT
Start: 2017-01-25 | End: 2017-01-25

## 2017-01-25 RX ORDER — LIDOCAINE 40 MG/G
CREAM TOPICAL
Status: DISCONTINUED | OUTPATIENT
Start: 2017-01-25 | End: 2017-01-25 | Stop reason: HOSPADM

## 2017-01-25 RX ORDER — ONDANSETRON 2 MG/ML
INJECTION INTRAMUSCULAR; INTRAVENOUS PRN
Status: DISCONTINUED | OUTPATIENT
Start: 2017-01-25 | End: 2017-01-25

## 2017-01-25 RX ORDER — CEFAZOLIN SODIUM 1 G/3ML
1 INJECTION, POWDER, FOR SOLUTION INTRAMUSCULAR; INTRAVENOUS SEE ADMIN INSTRUCTIONS
Status: DISCONTINUED | OUTPATIENT
Start: 2017-01-25 | End: 2017-01-25 | Stop reason: HOSPADM

## 2017-01-25 RX ORDER — DIPHENHYDRAMINE HCL 25 MG
25 CAPSULE ORAL EVERY 6 HOURS PRN
Status: DISCONTINUED | OUTPATIENT
Start: 2017-01-25 | End: 2017-02-10 | Stop reason: HOSPADM

## 2017-01-25 RX ORDER — LIDOCAINE HYDROCHLORIDE AND EPINEPHRINE 15; 5 MG/ML; UG/ML
INJECTION, SOLUTION EPIDURAL PRN
Status: DISCONTINUED | OUTPATIENT
Start: 2017-01-25 | End: 2017-01-25

## 2017-01-25 RX ORDER — LABETALOL HYDROCHLORIDE 5 MG/ML
10 INJECTION, SOLUTION INTRAVENOUS
Status: DISCONTINUED | OUTPATIENT
Start: 2017-01-25 | End: 2017-01-25 | Stop reason: HOSPADM

## 2017-01-25 RX ADMIN — FENTANYL CITRATE 25 MCG: 50 INJECTION, SOLUTION INTRAMUSCULAR; INTRAVENOUS at 15:44

## 2017-01-25 RX ADMIN — Medication 5000 UNITS: at 08:45

## 2017-01-25 RX ADMIN — SODIUM CHLORIDE, POTASSIUM CHLORIDE, SODIUM LACTATE AND CALCIUM CHLORIDE: 600; 310; 30; 20 INJECTION, SOLUTION INTRAVENOUS at 07:43

## 2017-01-25 RX ADMIN — PHENYLEPHRINE HYDROCHLORIDE 100 MCG: 10 INJECTION, SOLUTION INTRAMUSCULAR; INTRAVENOUS; SUBCUTANEOUS at 09:56

## 2017-01-25 RX ADMIN — FENTANYL CITRATE 50 MCG: 50 INJECTION, SOLUTION INTRAMUSCULAR; INTRAVENOUS at 11:17

## 2017-01-25 RX ADMIN — GLYCOPYRROLATE 1 MG: 0.2 INJECTION, SOLUTION INTRAMUSCULAR; INTRAVENOUS at 15:13

## 2017-01-25 RX ADMIN — FENTANYL CITRATE 50 MCG: 50 INJECTION, SOLUTION INTRAMUSCULAR; INTRAVENOUS at 11:18

## 2017-01-25 RX ADMIN — PHENYLEPHRINE HYDROCHLORIDE 100 MCG: 10 INJECTION, SOLUTION INTRAMUSCULAR; INTRAVENOUS; SUBCUTANEOUS at 09:46

## 2017-01-25 RX ADMIN — FENTANYL CITRATE 100 MCG: 50 INJECTION, SOLUTION INTRAMUSCULAR; INTRAVENOUS at 12:09

## 2017-01-25 RX ADMIN — POTASSIUM CHLORIDE 10 MEQ: 7.46 INJECTION, SOLUTION INTRAVENOUS at 11:20

## 2017-01-25 RX ADMIN — CEFAZOLIN SODIUM 2 G: 2 INJECTION, SOLUTION INTRAVENOUS at 08:30

## 2017-01-25 RX ADMIN — LIDOCAINE HYDROCHLORIDE,EPINEPHRINE BITARTRATE 3 ML: 15; .005 INJECTION, SOLUTION EPIDURAL; INFILTRATION; INTRACAUDAL; PERINEURAL at 11:45

## 2017-01-25 RX ADMIN — PHENYLEPHRINE HYDROCHLORIDE 0.7 MCG/KG/MIN: 10 INJECTION, SOLUTION INTRAMUSCULAR; INTRAVENOUS; SUBCUTANEOUS at 12:35

## 2017-01-25 RX ADMIN — FENTANYL CITRATE 50 MCG: 50 INJECTION, SOLUTION INTRAMUSCULAR; INTRAVENOUS at 14:28

## 2017-01-25 RX ADMIN — FENTANYL CITRATE 50 MCG: 50 INJECTION, SOLUTION INTRAMUSCULAR; INTRAVENOUS at 16:13

## 2017-01-25 RX ADMIN — Medication 5 MG: at 15:13

## 2017-01-25 RX ADMIN — FENTANYL CITRATE 100 MCG: 50 INJECTION, SOLUTION INTRAMUSCULAR; INTRAVENOUS at 07:59

## 2017-01-25 RX ADMIN — MIDAZOLAM 1 MG: 1 INJECTION INTRAMUSCULAR; INTRAVENOUS at 07:04

## 2017-01-25 RX ADMIN — FENTANYL CITRATE 50 MCG: 50 INJECTION, SOLUTION INTRAMUSCULAR; INTRAVENOUS at 15:50

## 2017-01-25 RX ADMIN — MIDAZOLAM HYDROCHLORIDE 1 MG: 1 INJECTION, SOLUTION INTRAMUSCULAR; INTRAVENOUS at 09:59

## 2017-01-25 RX ADMIN — ONDANSETRON 4 MG: 2 INJECTION INTRAMUSCULAR; INTRAVENOUS at 15:05

## 2017-01-25 RX ADMIN — SODIUM CHLORIDE, POTASSIUM CHLORIDE, SODIUM LACTATE AND CALCIUM CHLORIDE: 600; 310; 30; 20 INJECTION, SOLUTION INTRAVENOUS at 12:00

## 2017-01-25 RX ADMIN — ROCURONIUM BROMIDE 20 MG: 10 INJECTION INTRAVENOUS at 09:52

## 2017-01-25 RX ADMIN — ALBUMIN (HUMAN): 12.5 SOLUTION INTRAVENOUS at 12:00

## 2017-01-25 RX ADMIN — ERTAPENEM SODIUM 1 G: 1 INJECTION, POWDER, LYOPHILIZED, FOR SOLUTION INTRAMUSCULAR; INTRAVENOUS at 20:41

## 2017-01-25 RX ADMIN — FENTANYL CITRATE 25 MCG: 50 INJECTION, SOLUTION INTRAMUSCULAR; INTRAVENOUS at 15:54

## 2017-01-25 RX ADMIN — FENTANYL CITRATE 50 MCG: 50 INJECTION, SOLUTION INTRAMUSCULAR; INTRAVENOUS at 10:00

## 2017-01-25 RX ADMIN — FENTANYL CITRATE 50 MCG: 50 INJECTION, SOLUTION INTRAMUSCULAR; INTRAVENOUS at 10:12

## 2017-01-25 RX ADMIN — ROCURONIUM BROMIDE 25 MG: 10 INJECTION INTRAVENOUS at 11:00

## 2017-01-25 RX ADMIN — POTASSIUM CHLORIDE 10 MEQ: 7.46 INJECTION, SOLUTION INTRAVENOUS at 09:50

## 2017-01-25 RX ADMIN — FENTANYL CITRATE 50 MCG: 50 INJECTION, SOLUTION INTRAMUSCULAR; INTRAVENOUS at 07:04

## 2017-01-25 RX ADMIN — PHENYLEPHRINE HYDROCHLORIDE 100 MCG: 10 INJECTION, SOLUTION INTRAMUSCULAR; INTRAVENOUS; SUBCUTANEOUS at 11:56

## 2017-01-25 RX ADMIN — POTASSIUM CHLORIDE 10 MEQ: 7.46 INJECTION, SOLUTION INTRAVENOUS at 10:45

## 2017-01-25 RX ADMIN — Medication 10 MG: at 08:10

## 2017-01-25 RX ADMIN — BUPIVACAINE HYDROCHLORIDE 2 ML: 2.5 INJECTION, SOLUTION EPIDURAL; INFILTRATION; INTRACAUDAL at 15:36

## 2017-01-25 RX ADMIN — CEFAZOLIN SODIUM 1 G: 2 INJECTION, SOLUTION INTRAVENOUS at 10:30

## 2017-01-25 RX ADMIN — ROCURONIUM BROMIDE 30 MG: 10 INJECTION INTRAVENOUS at 08:45

## 2017-01-25 RX ADMIN — LIDOCAINE HYDROCHLORIDE 5 ML: 10 INJECTION, SOLUTION EPIDURAL; INFILTRATION; INTRACAUDAL; PERINEURAL at 15:36

## 2017-01-25 RX ADMIN — PHENYLEPHRINE HYDROCHLORIDE 100 MCG: 10 INJECTION, SOLUTION INTRAMUSCULAR; INTRAVENOUS; SUBCUTANEOUS at 12:00

## 2017-01-25 RX ADMIN — PROPOFOL 150 MG: 10 INJECTION, EMULSION INTRAVENOUS at 07:59

## 2017-01-25 RX ADMIN — FENTANYL CITRATE 50 MCG: 50 INJECTION, SOLUTION INTRAMUSCULAR; INTRAVENOUS at 08:59

## 2017-01-25 RX ADMIN — CEFAZOLIN SODIUM 1 G: 2 INJECTION, SOLUTION INTRAVENOUS at 14:30

## 2017-01-25 RX ADMIN — FENTANYL CITRATE 50 MCG: 50 INJECTION, SOLUTION INTRAMUSCULAR; INTRAVENOUS at 16:02

## 2017-01-25 RX ADMIN — PHENYLEPHRINE HYDROCHLORIDE 100 MCG: 10 INJECTION, SOLUTION INTRAMUSCULAR; INTRAVENOUS; SUBCUTANEOUS at 08:25

## 2017-01-25 RX ADMIN — MIDAZOLAM HYDROCHLORIDE 1 MG: 1 INJECTION, SOLUTION INTRAMUSCULAR; INTRAVENOUS at 07:59

## 2017-01-25 RX ADMIN — SODIUM CHLORIDE, POTASSIUM CHLORIDE, SODIUM LACTATE AND CALCIUM CHLORIDE: 600; 310; 30; 20 INJECTION, SOLUTION INTRAVENOUS at 19:22

## 2017-01-25 RX ADMIN — MORPHINE SULFATE 2 MG: 2 INJECTION, SOLUTION INTRAMUSCULAR; INTRAVENOUS at 16:30

## 2017-01-25 RX ADMIN — POTASSIUM CHLORIDE 10 MEQ: 7.46 INJECTION, SOLUTION INTRAVENOUS at 10:25

## 2017-01-25 RX ADMIN — Medication 10 MG: at 12:17

## 2017-01-25 RX ADMIN — SODIUM CHLORIDE, POTASSIUM CHLORIDE, SODIUM LACTATE AND CALCIUM CHLORIDE: 600; 310; 30; 20 INJECTION, SOLUTION INTRAVENOUS at 08:15

## 2017-01-25 RX ADMIN — ROCURONIUM BROMIDE 25 MG: 10 INJECTION INTRAVENOUS at 13:00

## 2017-01-25 RX ADMIN — MORPHINE SULFATE 2 MG: 2 INJECTION, SOLUTION INTRAMUSCULAR; INTRAVENOUS at 16:38

## 2017-01-25 RX ADMIN — PHENYLEPHRINE HYDROCHLORIDE 150 MCG: 10 INJECTION, SOLUTION INTRAMUSCULAR; INTRAVENOUS; SUBCUTANEOUS at 12:13

## 2017-01-25 RX ADMIN — CEFAZOLIN SODIUM 1 G: 2 INJECTION, SOLUTION INTRAVENOUS at 12:30

## 2017-01-25 RX ADMIN — BUPIVACAINE HYDROCHLORIDE 8 ML/HR: 7.5 INJECTION, SOLUTION EPIDURAL; RETROBULBAR at 11:49

## 2017-01-25 RX ADMIN — PHENYLEPHRINE HYDROCHLORIDE 150 MCG: 10 INJECTION, SOLUTION INTRAMUSCULAR; INTRAVENOUS; SUBCUTANEOUS at 12:31

## 2017-01-25 RX ADMIN — PROPOFOL 50 MG: 10 INJECTION, EMULSION INTRAVENOUS at 08:03

## 2017-01-25 RX ADMIN — ROCURONIUM BROMIDE 50 MG: 10 INJECTION INTRAVENOUS at 07:59

## 2017-01-25 RX ADMIN — MORPHINE SULFATE 150 MG: 5 INJECTION, SOLUTION INTRAVENOUS at 16:30

## 2017-01-25 NOTE — IP AVS SNAPSHOT
[address] [address]    Phone:  [phone]                                       After Visit Summary   1/25/2017    Tammy Borges    MRN: [id number]           After Visit Summary Signature Page     I have received my discharge instructions, and my questions have been answered. I have discussed any challenges I see with this plan with the nurse or doctor.    ..........................................................................................................................................  Patient/Patient Representative Signature      ..........................................................................................................................................  Patient Representative Print Name and Relationship to Patient    ..................................................               ................................................  Date                                            Time    ..........................................................................................................................................  Reviewed by Signature/Title    ...................................................              ..............................................  Date                                                            Time

## 2017-01-25 NOTE — OR NURSING
Dr Moralez at bedside, assessed epidueral, 4 Marcane, 5 lidocane bolus given. Dr Poe will return to reassess pt.

## 2017-01-25 NOTE — LETTER
Transition Communication Hand-off for Care Transitions to Next Level of Care Provider    Name: Tammy Borges  MRN #: [id number]  Primary Care Provider: Leonard Stovall MD     Primary Clinic: [facility] [address] [place] [address]     Reason for Hospitalization:  Ventral Hernia, Gallstones; Neurogenic Bladder   Ventral hernia  Admit Date/Time: 1/25/2017  5:35 AM  Discharge Date: 2/10/2017  Payor Source: Payor: COMMERCIAL / Plan: [facility] [employer] / Product Type: PPO /            Reason for Communication Hand-off Referral: Other continuity of care    Discharge Plan:       Concern for non-adherence with plan of care:   Y/N no  Discharge Needs Assessment:  Needs       Most Recent Value    Equipment Currently Used at Home shower chair    Transportation Available car          Follow-up plan:  Future Appointments  Date Time Provider Department Center   3/6/2017 8:30 AM [facility]   3/6/2017 1:30 PM Alban Coats MD [facility]       Any outstanding tests or procedures:        Referrals     Future Labs/Procedures    Home infusion referral     Comments:    Your provider has referred you to:   [facility](Contact: Blayne Uriarte home health pharmacist)  Phone: [phone]  Fax: [phone]    Local Address (if different from home address): N/A    Anticipated Length of Therapy: Per MD Order  TPN via PICC  IV antibiotic      Home Infusion Pharmacist to adjust therapy based on labs and clinical assessments: Yes    Labs:  May draw labs from Venous Catheter: Yes  Home Infusion Pharmacist to order labs based on therapy type and clinical assessments: Yes    Agency Staff to assess nursing needs for Infusion Therapy.    Access Device Management:  IV Access Type: PICC  Flush with Heparin and Normal Saline IVP PRN and routine site care (per agency protocol) to maintain access device? Yes    Physical Therapy Referral     Comments:    [facility]  [address] [place] [address]  Phone: [phone]  Fax: [phone]      Treatment: Evaluation & Treatment  PT Diagnosis: Impaired functional activity s/p abdominal surgery    Please be aware that coverage of these services is subject to the terms and limitations of your health insurance plan.  Call member services at your health plan with any benefit or coverage questions.                   Thais Martinez, RNCC  [phone]

## 2017-01-25 NOTE — OR NURSING
Dr. Alejandro instilled several drops of Methylene blue into 1000 mL of saline to instill in neobladder to check for any drainage into vagina. Patient up in stirrups at this time. Arms out on armboards padded and safety strap on.  In yellow fin stirrups positioned by same staff that positioned back into supine position for the surgical procedure.      Performed before surgical procedure.

## 2017-01-25 NOTE — ANESTHESIA POSTPROCEDURE EVALUATION
Patient: Tammy Borges    LAPAROTOMY EXPLORATORY (N/A Abdomen)  REVISE ILEAL LOOP CONDUIT (N/A Abdomen)  Additional InformationProcedure(s):  Exploratory laparotomy, Extensive Lysis of Adhsions,Takedown jejunostomy,jejunocolic  anastanosis, Colic Lavage, cholecstectomy,Primary repair of ventral hernia, Biopsy of messentary mass, mobilization of neobladder - Wound Class: IV-Dirty or Infected  Mobilization of neobladder - Wound Class: IV-Dirty or Infected    Diagnosis:Ventral Hernia, Gallstones; Neurogenic Bladder   Diagnosis Additional Information: No value filed.    Anesthesia Type:  General, ETT, Epidural    Note:  Anesthesia Post Evaluation    Patient location during evaluation: bedside  Patient participation: Able to fully participate in evaluation  Level of consciousness: awake and alert  Pain management: adequate (Pain better controlled after epidural bolus and rate increase. Epidural of 0.0625% running at 12cc/hr)  Airway patency: patent  Cardiovascular status: acceptable, blood pressure returned to baseline and stable  Respiratory status: acceptable and spontaneous ventilation  Hydration status: acceptable  PONV: none     Anesthetic complications: None    Comments: Piyush Rojas MD  Staff Anesthesiologist  Phone: [phone]  January 25, 2017          Last vitals:  Filed Vitals:    01/25/17 1730 01/25/17 1745 01/25/17 1800   BP: 103/64 107/63 93/69   Pulse:      Temp: 36.7  C (98  F)     Resp: 16 16    SpO2: 99% 100% 100%       Electronically Signed By: Piyush Rojas MD  January 25, 2017

## 2017-01-25 NOTE — ANESTHESIA CARE TRANSFER NOTE
Patient: Tammy Borges    LAPAROTOMY EXPLORATORY (N/A Abdomen)  REVISE ILEAL LOOP CONDUIT (N/A Abdomen)  Additional InformationProcedure(s):  Exploratory laparotomy, Extensive Lysis of Adhsions,Takedown jejunostomy,jejunocolic  anastanosis, Colic Lavage, cholecstectomy,Primary repair of ventral hernia, Biopsy of messentary mass, mobilization of neobladder - Wound Class: IV-Dirty or Infected  Mobilization of neobladder - Wound Class: IV-Dirty or Infected    Diagnosis: Ventral Hernia, Gallstones; Neurogenic Bladder   Diagnosis Additional Information: No value filed.    Anesthesia Type:   General     Note:  Airway :Face Mask  Patient transferred to:PACU  Comments: Pt to recovery room.  Spontaneous respirations.   Report given to RN.        Vitals: (Last set prior to Anesthesia Care Transfer)              Electronically Signed By: KAREEM Bennett CRNA  January 25, 2017  3:28 PM

## 2017-01-25 NOTE — OR NURSING
Dr Moralez at bedside, increased epidural rate to 10 mg/hr, advised to hold IV pain meds at this time. Pt's pain improving.

## 2017-01-25 NOTE — ANESTHESIA PROCEDURE NOTES
Epidural Procedure Note    Staff:     Anesthesiologist:  ROSLYN VILLAR    Resident/CRNA:  ODESSA BRADLEY    Procedure performed by resident/CRNA in the presence of a teaching physician    Location: Pre-op     Procedure start time:  1/25/2017 7:00 AM     Procedure end time:  1/25/2017 7:20 AM   Pre-procedure checklist:   patient identified, IV checked, site marked, risks and benefits discussed, informed consent, monitors and equipment checked, pre-op evaluation, at physician/surgeon's request and post-op pain management      Correct Patient: Yes      Correct Position: Yes      Correct Site: Yes      Correct Procedure: Yes      Correct Laterality:  Yes    Site Marked:  Yes  Procedure:     Procedure:  Epidural catheter    ASA:  2    Diagnosis:  ABD surgery    Position:  Sitting    Sterile Prep: chloraprep, mask, sterile gloves and patient draped      Insertion site:  T8-9    Local skin infiltration:  2% lidocaine    Approach:  Right paramedian    Needle gauge (G):  17    Needle Length (in):  3.5    Block Needle Type:  Touhy    Injection Technique:  LORT saline    TOAN at (cm):  8    Attempts:  1    Redirects:  2    Catheter gauge (G):  19    Catheter threaded easily: Yes      Threaded to cm at skin:  13    Paresthesias:  No    Aspiration negative for Heme or CSF: No      Test dose (mL):  3     Local anesthetic:  Lidocaine 1.5% w/ 1:200,000 epinephrine    Test dose negative for signs of intravascular, subdural or intrathecal injection: Yes    Assessment/Narrative:      Possible wet tap. 5mL of saline pushed into the epidural space followed by clear fluid very slowly dripping from the touhy. The touhy was then withdrawn and TOAN was achieved again. Catheter was threaded easily, negative aspiration and test dose.         Arterial Line Procedure Note  Staff:     Anesthesiologist:  JUANA GARCIA    Resident/CRNA:  ODESSA BRADLEY    Arterial line performed by resident/CRNA in presence of a teaching  physician    Location: In OR After Induction  Procedure Start/Stop Times:     patient identified, IV checked, site marked, risks and benefits discussed, informed consent, monitors and equipment checked, pre-op evaluation and at physician/surgeon's request      Correct Patient: Yes      Correct Position: Yes      Correct Site: Yes      Correct Procedure: Yes      Correct Laterality:  N/A    Site Marked:  N/A  Line Placement:     Procedure:  Arterial Line    Insertion Site:  Radial    Insertion laterality:  Left    Skin Prep: Chloraprep      Patient Prep: patient draped, mask, sterile gloves, hat and hand hygiene      Local skin infiltration:  None    Ultrasound Guided?: No      Catheter size:  20 gauge, 12 cm    Cath secured with: suture      Dressing:  Tegaderm    Complications:  None obvious    Arterial waveform: Yes      IBP within 10% of NIBP: Yes    Assessment/Narrative:      Call with questions or concerns,    Dieter Abdalla MD  Anesthesiology Resident   [phone]

## 2017-01-25 NOTE — BRIEF OP NOTE
[facility], [place]    Brief Operative Note    Pre-operative diagnosis: Ventral Hernia, Gallstones; Neurogenic Bladder   Post-operative diagnosis same  Procedure: Procedure(s):  Exploratory laparotomy, Extensive Lysis of Adhsions,Takedown jejunostomy,jejunocolic  anastanosis, Colic Lavage, cholecstectomy,Primary repair of ventral hernia, Biopsy of messentary mass, mobilization of neobladder - Wound Class: IV-Dirty or Infected  Mobilization of neobladder - Wound Class: IV-Dirty or Infected  Surgeon: Surgeon(s) and Role:  Panel 1:     * Alban Coats MD - Primary     * Mohan Mtz MD    Panel 2:     * Saqib Alejandro MD - Primary  Anesthesia: Combined General with Block   Estimated blood loss: 500 ml  Drains: None  Specimens:   ID Type Source Tests Collected by Time Destination   1 : abdominal abcess Tissue Abdomen AFB STAIN NON BLOOD, ANAEROBIC BACTERIAL CULTURE, FUNGUS CULTURE, TISSUE CULTURE AEROBIC BACTERIAL Alban Coats MD 1/25/2017  9:49 AM    A : omentual Nodule, don't want the fungus culture Tissue Abdomen FUNGUS CULTURE, SURGICAL PATHOLOGY EXAM Alban Coats MD 1/25/2017  9:37 AM    B : omental Nodule Tissue Abdomen SURGICAL PATHOLOGY EXAM Alban Coats MD 1/25/2017  9:45 AM    C : duodenal   lymph node Tissue Other SURGICAL PATHOLOGY EXAM Alban Coats MD 1/25/2017 10:00 AM    D : J-Junostomy margin Tissue Abdomen SURGICAL PATHOLOGY EXAM Albna Coats MD 1/25/2017 11:39 AM    E : gallbladder and contents Organ Gallbladder and Contents SURGICAL PATHOLOGY EXAM Alban Coats MD 1/25/2017 12:23 PM    F : omentum Tissue Omentum SURGICAL PATHOLOGY EXAM Alban Coats MD 1/25/2017 12:39 PM    G : hernia sac Tissue Hernia Sac SURGICAL PATHOLOGY EXAM Alban Coats MD 1/25/2017  2:32 PM      Findings:   None.  Complications: None.  Implants: None.        Dictation number [id number]

## 2017-01-25 NOTE — IP AVS SNAPSHOT
MRN:[id number]                      After Visit Summary   1/25/2017    Tammy Borges    MRN: [id number]           Thank you!     Thank you for choosing [facility] for your care. Our goal is always to provide you with excellent care. Hearing back from our patients is one way we can continue to improve our services. Please take a few minutes to complete the written survey that you may receive in the mail after you visit with us. Thank you!        Patient Information     Date Of Birth          1960        About your hospital stay     You were admitted on:  January 25, 2017 You last received care in the:  [facility]    You were discharged on:  February 10, 2017        Reason for your hospital stay       Ventral hernia without obstruction or gangrene  (primary encounter diagnosis)  Small bowel anastomotic leak                  Who to Call     For medical emergencies, please call 911.  For non-urgent questions about your medical care, please call your primary care provider or clinic, [phone]  For questions related to your surgery, please call your surgery clinic        Attending Provider     Provider    Alban Coats MD       Primary Care Provider Office Phone # Fax #    Leonard LAUREN MD Sia, [phone]       [facility] [address] [place] [address]        After Care Instructions     Activity       Your activity upon discharge: no lifting greater than 10 lbs for 4 weeks            Diet       Follow this diet upon discharge:  NPO            Tubes and drains       You are going home with the following tubes or drains: BOB.  Follow these instructions  to care for your tube    Flush twice daily with 10cc sterile saline                  Follow-up Appointments     Follow Up and recommended labs and tests       - Follow-up as needed in clinic in 2 weeks with MD Jorge L. If your surgeon is not available in this time-frame you might see one of their partners. You  should be contacted by a nurse within 3 business days to check how you are doing. If you are not contacted please call RN care coordinator: Gina Kevin [phone].                  Your next 10 appointments already scheduled     Mar 06, 2017  8:30 AM   (Arrive by 8:15 AM)   Post-Op with Aman Collier DO   [facility] ([facility])    [address]  [address] [facility] [address] [phone]            Mar 06, 2017  1:30 PM   (Arrive by 1:15 PM)   Post-Op with Alban Coats MD   [facility] ([facility])    [address]  [address] [facility] [address] [phone]              Additional Services     Home infusion referral       Your provider has referred you to:   [facility](Contact: Blayne Uriarte, home health pharmacist)  Phone: [phone]  Fax: [phone]    Local Address (if different from home address): N/A    Anticipated Length of Therapy: Per MD Order  TPN via PICC  IV antibiotic      Home Infusion Pharmacist to adjust therapy based on labs and clinical assessments: Yes    Labs:  May draw labs from Venous Catheter: Yes  Home Infusion Pharmacist to order labs based on therapy type and clinical assessments: Yes    Agency Staff to assess nursing needs for Infusion Therapy.    Access Device Management:  IV Access Type: PICC  Flush with Heparin and Normal Saline IVP PRN and routine site care (per agency protocol) to maintain access device? Yes            Physical Therapy Referral       [facility]  [address] [place] [address]  Phone: [phone]  Fax: [phone]      Treatment: Evaluation & Treatment  PT Diagnosis: Impaired functional activity s/p abdominal surgery    Please be aware that coverage of these services is subject to the terms and limitations of your health insurance plan.  Call member services at your  "health plan with any benefit or coverage questions.                     Pending Results     Date and Time Order Name Status Description    2/10/2017 0100 Prealbumin In process     2/8/2017 1515 Abscess Culture Aerobic Bacterial Preliminary     2/8/2017 1023 Anaerobic bacterial culture Preliminary     2/8/2017 1017 IR PICC Reposition Left In process     1/25/2017 0950 Fungus Culture, non-blood Preliminary     1/25/2017 0949 AFB Culture Non Blood Preliminary             Statement of Approval     Ordered          02/10/17 1024  I have reviewed and agree with all the recommendations and orders detailed in this document.   EFFECTIVE NOW     Approved and electronically signed by:  Ricardo Leung MD             Admission Information        Provider Department Dept Phone    1/25/2017 Alban Coats MD [facility] [phone]      Your Vitals Were     Blood Pressure Pulse Temperature    104/65 mmHg 76 96.1  F (35.6  C) (Oral)    Respirations Height Weight    16 1.651 m (5' 5\") 103.692 kg (228 lb 9.6 oz)    BMI (Body Mass Index) Pulse Oximetry       38.04 kg/m2 95%       MyChart Information     [employer] gives you secure access to your electronic health record. If you see a primary care provider, you can also send messages to your care team and make appointments. If you have questions, please call your primary care clinic.  If you do not have a primary care provider, please call [phone] and they will assist you.        Care EveryWhere ID     This is your Care EveryWhere ID. This could be used by other organizations to access your [facility] medical records  [id number]           Review of your medicines      START taking        Dose / Directions    meropenem 500 MG vial   Commonly known as:  MERREM   Indication:  Urinary Tract Infection   Used for:  Small bowel anastomotic leak        Dose:  500 mg   Inject 500 mg into the vein every 6 hours   Quantity:  560 mL   Refills:  0       oxyCODONE 5 MG IR tablet "   Commonly known as:  ROXICODONE   Used for:  Ventral hernia without obstruction or gangrene        Dose:  5-10 mg   Take 1-2 tablets (5-10 mg) by mouth every 3 hours as needed for moderate to severe pain   Quantity:  60 tablet   Refills:  0       parenteral nutrition - PTA/DISCHARGE ORDER   Used for:  Ventral hernia without obstruction or gangrene        The TPN formula will print on the After Visit Summary Report.   Quantity:  1 each   Refills:  0         CONTINUE these medicines which have NOT CHANGED        Dose / Directions    acetaminophen 325 MG tablet   Commonly known as:  TYLENOL   Used for:  VVF (vesicovaginal fistula)        Dose:  325 mg   Take 325 mg by mouth as needed   Refills:  0       LEVOTHYROXINE SODIUM PO        Dose:  275 mcg   Take 275 mcg by mouth every morning   Refills:  0       LOMOTIL PO        Dose:  2 tablet   Take 2 tablets by mouth as needed   Refills:  0       LORazepam 0.5 MG tablet   Commonly known as:  ATIVAN   Used for:  VVF (vesicovaginal fistula)        Dose:  0.5 mg   Take 0.5 mg by mouth as needed   Refills:  0       magnesium sulfate 500 mg/mL injection   Used for:  VVF (vesicovaginal fistula)        as needed   Refills:  0       * NaCl 0.9 % infusion   Indication:  Fluid and Electrolyte Disturbance, may take every day, typical is every other day.        Dose:  1000 mL   Inject 1,000 mLs into the vein daily as needed (added Magnesim, 500 mg IV by pharamist.)   Refills:  0       * NaCl 0.9 % infusion   Used for:  VVF (vesicovaginal fistula)        Dose:  10 mL   Inject 10 mLs into the vein every 8 hours   Refills:  0       omeprazole 20 MG CR capsule   Commonly known as:  priLOSEC   Used for:  VVF (vesicovaginal fistula)        Dose:  20 mg   Take 20 mg by mouth every morning   Refills:  0       ZOFRAN PO        Dose:  4 mg   Take 4 mg by mouth every 6 hours as needed   Refills:  0       * Notice:  This list has 2 medication(s) that are the same as other medications prescribed  for you. Read the directions carefully, and ask your doctor or other care provider to review them with you.         Where to get your medicines      Some of these will need a paper prescription and others can be bought over the counter. Ask your nurse if you have questions.     Bring a paper prescription for each of these medications    - meropenem 500 MG vial  - oxyCODONE 5 MG IR tablet  - parenteral nutrition - PTA/DISCHARGE ORDER      PARENTERAL NUTRITION FORMULA            (Show up to 1 orders; newest on the left.)     Start date and time   02/10/2017 2000      parenteral nutrition - ADULT compounded formula [[id number]]    Order Status  Active       Macro Ingredients    amino acid (PROSOL) 20 %  100 g    dextrose  150 g       Electrolytes    sodium phosphate  30 mmol    potassium chloride  46.67 mEq    potassium acetate  23.33 mEq    calcium gluconate  1.29 g    magnesium sulfate  1.48 g       Additives    multivitamin-ADULT (INFUVITE)  10 mL    trace elements (Multitrace-5 Conc)  1 mL       QS Base    sterile water  413.86 mL       Calorie Contribution    Proteins  400 kcal    Dextrose  514.29 kcal    Lipids  --    Total  914.29 kcal       Electrolyte Ion Ordered Amount    Sodium  40 mEq    Potassium  70 mEq    Calcium  6 mEq    Magnesium  12 mEq    Phosphate  30 mmol    Acetate  93.33 mEq       Electrolyte Ion Calculated Amount    Sodium  40 mEq    Potassium  70 mEq    Calcium  6 mEq    Magnesium  12 mEq    Aluminum  --    Phosphate  30 mmol    Chloride  46.67 mEq    Acetate  93.33 mEq       Other    Total Protein  100 g    Total Protein/kg  1.47 g/kg    Glucose Infusion Rate  1.53 mg/kg/min    Osmolarity  1,671.67    Volume  1,200 mL    Rate  50 mL/hr    Dosing Weight  68 kg (Order-Specific)    Infusion Site  [facility]       Admin Instructions       Infuse using a 0.22 micron filter.  Nurse Instructions: When TPN is discontinued, decrease rate to   of current rate for 1 hour. May continue at   rate until bag  runs out or for 24h.               Protect others around you: Learn how to safely use, store and throw away your medicines at www.disposemymeds.org.             Medication List: This is a list of all your medications and when to take them. Check marks below indicate your daily home schedule. Keep this list as a reference.      Medications           Morning Afternoon Evening Bedtime As Needed    acetaminophen 325 MG tablet   Commonly known as:  TYLENOL   Take 325 mg by mouth as needed   Last time this was given:  1,000 mg on 2/10/2017  8:19 AM                                LEVOTHYROXINE SODIUM PO   Take 275 mcg by mouth every morning   Last time this was given:  2/10/2017  8:19 AM                                LOMOTIL PO   Take 2 tablets by mouth as needed                                LORazepam 0.5 MG tablet   Commonly known as:  ATIVAN   Take 0.5 mg by mouth as needed                                magnesium sulfate 500 mg/mL injection   as needed   Last time this was given:  2/9/2017  8:22 PM                                meropenem 500 MG vial   Commonly known as:  MERREM   Inject 500 mg into the vein every 6 hours   Last time this was given:  500 mg on 2/10/2017  8:19 AM                                * NaCl 0.9 % infusion   Inject 1,000 mLs into the vein daily as needed (added Magnesim, 500 mg IV by pharamist.)   Last time this was given:  10 mLs on 2/10/2017  8:20 AM                                * NaCl 0.9 % infusion   Inject 10 mLs into the vein every 8 hours   Last time this was given:  10 mLs on 2/10/2017  8:20 AM                                omeprazole 20 MG CR capsule   Commonly known as:  priLOSEC   Take 20 mg by mouth every morning                                oxyCODONE 5 MG IR tablet   Commonly known as:  ROXICODONE   Take 1-2 tablets (5-10 mg) by mouth every 3 hours as needed for moderate to severe pain   Last time this was given:  10 mg on 2/10/2017  8:19 AM                                 parenteral nutrition - PTA/DISCHARGE ORDER   The TPN formula will print on the After Visit Summary Report.                                ZOFRAN PO   Take 4 mg by mouth every 6 hours as needed                                * Notice:  This list has 2 medication(s) that are the same as other medications prescribed for you. Read the directions carefully, and ask your doctor or other care provider to review them with you.

## 2017-01-25 NOTE — OR NURSING
Epidural placed pre-operatively.  Tolerated procedure well.  Test dose given at 0710. Denies adis-oral numbness, tingling, and ringing in the ears.

## 2017-01-26 ENCOUNTER — APPOINTMENT (OUTPATIENT)
Dept: GENERAL RADIOLOGY | Facility: CLINIC | Age: 57
DRG: 329 | End: 2017-01-26
Attending: SURGERY
Payer: COMMERCIAL

## 2017-01-26 ENCOUNTER — APPOINTMENT (OUTPATIENT)
Dept: PHYSICAL THERAPY | Facility: CLINIC | Age: 57
DRG: 329 | End: 2017-01-26
Attending: STUDENT IN AN ORGANIZED HEALTH CARE EDUCATION/TRAINING PROGRAM
Payer: COMMERCIAL

## 2017-01-26 LAB
ANION GAP SERPL CALCULATED.3IONS-SCNC: 10 MMOL/L (ref 3–14)
BUN SERPL-MCNC: 24 MG/DL (ref 7–30)
CALCIUM SERPL-MCNC: 8.8 MG/DL (ref 8.5–10.1)
CHLORIDE SERPL-SCNC: 109 MMOL/L (ref 94–109)
CO2 SERPL-SCNC: 22 MMOL/L (ref 20–32)
CREAT SERPL-MCNC: 2.02 MG/DL (ref 0.52–1.04)
ERYTHROCYTE [DISTWIDTH] IN BLOOD BY AUTOMATED COUNT: 15.2 % (ref 10–15)
GFR SERPL CREATININE-BSD FRML MDRD: 26 ML/MIN/1.7M2
GLUCOSE SERPL-MCNC: 137 MG/DL (ref 70–99)
GRAM STN SPEC: ABNORMAL
HCT VFR BLD AUTO: 41.3 % (ref 35–47)
HGB BLD-MCNC: 12.8 G/DL (ref 11.7–15.7)
MAGNESIUM SERPL-MCNC: 1.9 MG/DL (ref 1.6–2.3)
MCH RBC QN AUTO: 29.1 PG (ref 26.5–33)
MCHC RBC AUTO-ENTMCNC: 31 G/DL (ref 31.5–36.5)
MCV RBC AUTO: 94 FL (ref 78–100)
MICRO REPORT STATUS: ABNORMAL
PLATELET # BLD AUTO: 234 10E9/L (ref 150–450)
POTASSIUM SERPL-SCNC: 4.2 MMOL/L (ref 3.4–5.3)
RBC # BLD AUTO: 4.4 10E12/L (ref 3.8–5.2)
SODIUM SERPL-SCNC: 140 MMOL/L (ref 133–144)
SPECIMEN SOURCE: ABNORMAL
WBC # BLD AUTO: 8.8 10E9/L (ref 4–11)

## 2017-01-26 PROCEDURE — 25000128 H RX IP 250 OP 636: Performed by: ANESTHESIOLOGY

## 2017-01-26 PROCEDURE — 97110 THERAPEUTIC EXERCISES: CPT | Mod: GP

## 2017-01-26 PROCEDURE — 25000128 H RX IP 250 OP 636: Performed by: NURSE PRACTITIONER

## 2017-01-26 PROCEDURE — 25800025 ZZH RX 258: Performed by: SURGERY

## 2017-01-26 PROCEDURE — 40000940 XR ABDOMEN PORT F1 VW

## 2017-01-26 PROCEDURE — 85027 COMPLETE CBC AUTOMATED: CPT | Performed by: STUDENT IN AN ORGANIZED HEALTH CARE EDUCATION/TRAINING PROGRAM

## 2017-01-26 PROCEDURE — 36592 COLLECT BLOOD FROM PICC: CPT | Performed by: STUDENT IN AN ORGANIZED HEALTH CARE EDUCATION/TRAINING PROGRAM

## 2017-01-26 PROCEDURE — 83735 ASSAY OF MAGNESIUM: CPT | Performed by: STUDENT IN AN ORGANIZED HEALTH CARE EDUCATION/TRAINING PROGRAM

## 2017-01-26 PROCEDURE — 25800025 ZZH RX 258: Performed by: STUDENT IN AN ORGANIZED HEALTH CARE EDUCATION/TRAINING PROGRAM

## 2017-01-26 PROCEDURE — 40000193 ZZH STATISTIC PT WARD VISIT

## 2017-01-26 PROCEDURE — 80048 BASIC METABOLIC PNL TOTAL CA: CPT | Performed by: STUDENT IN AN ORGANIZED HEALTH CARE EDUCATION/TRAINING PROGRAM

## 2017-01-26 PROCEDURE — 25000125 ZZHC RX 250: Performed by: STUDENT IN AN ORGANIZED HEALTH CARE EDUCATION/TRAINING PROGRAM

## 2017-01-26 PROCEDURE — 97161 PT EVAL LOW COMPLEX 20 MIN: CPT | Mod: GP

## 2017-01-26 PROCEDURE — 12000003 ZZH R&B CRITICAL UMMC

## 2017-01-26 PROCEDURE — 97530 THERAPEUTIC ACTIVITIES: CPT | Mod: GP

## 2017-01-26 PROCEDURE — 40000940 XR CHEST PORT 1 VW

## 2017-01-26 PROCEDURE — 25000125 ZZHC RX 250: Performed by: NURSE PRACTITIONER

## 2017-01-26 PROCEDURE — 71010 XR CHEST PORT 1 VW: CPT

## 2017-01-26 RX ORDER — HYDROXYZINE HYDROCHLORIDE 25 MG/1
25 TABLET, FILM COATED ORAL EVERY 6 HOURS PRN
Status: DISCONTINUED | OUTPATIENT
Start: 2017-01-26 | End: 2017-01-26

## 2017-01-26 RX ORDER — HYDROXYZINE HYDROCHLORIDE 25 MG/1
50 TABLET, FILM COATED ORAL EVERY 6 HOURS PRN
Status: DISCONTINUED | OUTPATIENT
Start: 2017-01-26 | End: 2017-01-26

## 2017-01-26 RX ORDER — BUPIVACAINE HYDROCHLORIDE 2.5 MG/ML
2.5 INJECTION, SOLUTION EPIDURAL; INFILTRATION; INTRACAUDAL ONCE
Status: COMPLETED | OUTPATIENT
Start: 2017-01-26 | End: 2017-01-26

## 2017-01-26 RX ORDER — LORAZEPAM 2 MG/ML
0.5 INJECTION INTRAMUSCULAR EVERY 6 HOURS PRN
Status: DISCONTINUED | OUTPATIENT
Start: 2017-01-26 | End: 2017-02-10 | Stop reason: HOSPADM

## 2017-01-26 RX ADMIN — SODIUM CHLORIDE, POTASSIUM CHLORIDE, SODIUM LACTATE AND CALCIUM CHLORIDE: 600; 310; 30; 20 INJECTION, SOLUTION INTRAVENOUS at 22:22

## 2017-01-26 RX ADMIN — SODIUM CHLORIDE, POTASSIUM CHLORIDE, SODIUM LACTATE AND CALCIUM CHLORIDE: 600; 310; 30; 20 INJECTION, SOLUTION INTRAVENOUS at 12:49

## 2017-01-26 RX ADMIN — HEPARIN SODIUM 5000 UNITS: 5000 INJECTION, SOLUTION INTRAVENOUS; SUBCUTANEOUS at 14:17

## 2017-01-26 RX ADMIN — SODIUM CHLORIDE, POTASSIUM CHLORIDE, SODIUM LACTATE AND CALCIUM CHLORIDE 1000 ML: 600; 310; 30; 20 INJECTION, SOLUTION INTRAVENOUS at 17:41

## 2017-01-26 RX ADMIN — HEPARIN SODIUM 5000 UNITS: 5000 INJECTION, SOLUTION INTRAVENOUS; SUBCUTANEOUS at 22:41

## 2017-01-26 RX ADMIN — PANTOPRAZOLE SODIUM 40 MG: 40 INJECTION, POWDER, FOR SOLUTION INTRAVENOUS at 08:36

## 2017-01-26 RX ADMIN — LEVOTHYROXINE SODIUM ANHYDROUS 125 MCG: 100 INJECTION, POWDER, LYOPHILIZED, FOR SOLUTION INTRAVENOUS at 08:36

## 2017-01-26 RX ADMIN — HYDROMORPHONE HYDROCHLORIDE: 10 INJECTION, SOLUTION INTRAMUSCULAR; INTRAVENOUS; SUBCUTANEOUS at 22:39

## 2017-01-26 RX ADMIN — BUPIVACAINE HYDROCHLORIDE 6.25 MG: 2.5 INJECTION, SOLUTION EPIDURAL; INFILTRATION; INTRACAUDAL; PERINEURAL at 09:54

## 2017-01-26 RX ADMIN — BUPIVACAINE HYDROCHLORIDE: 7.5 INJECTION, SOLUTION EPIDURAL; RETROBULBAR at 10:46

## 2017-01-26 RX ADMIN — HYDROMORPHONE HYDROCHLORIDE: 10 INJECTION, SOLUTION INTRAMUSCULAR; INTRAVENOUS; SUBCUTANEOUS at 14:20

## 2017-01-26 RX ADMIN — HYDROMORPHONE HYDROCHLORIDE 0.1 MG: 10 INJECTION, SOLUTION INTRAMUSCULAR; INTRAVENOUS; SUBCUTANEOUS at 01:33

## 2017-01-26 ASSESSMENT — ACTIVITIES OF DAILY LIVING (ADL)
TOILETING: 0-->INDEPENDENT
TRANSFERRING: 0-->INDEPENDENT
SWALLOWING: 0-->SWALLOWS FOODS/LIQUIDS WITHOUT DIFFICULTY
DRESS: 0-->INDEPENDENT
FALL_HISTORY_WITHIN_LAST_SIX_MONTHS: NO
COGNITION: 0 - NO COGNITION ISSUES REPORTED
AMBULATION: 0-->INDEPENDENT
BATHING: 0-->INDEPENDENT
RETIRED_EATING: 0-->INDEPENDENT
RETIRED_COMMUNICATION: 0-->UNDERSTANDS/COMMUNICATES WITHOUT DIFFICULTY

## 2017-01-26 NOTE — PLAN OF CARE
"Problem: Goal Outcome Summary  Goal: Goal Outcome Summary  /62 mmHg  Pulse 83  Temp(Src) 97.2  F (36.2  C) (Axillary)  Resp 18  Ht 1.651 m (5' 5\")  Wt 100.5 kg (221 lb 9 oz)  BMI 36.87 kg/m2  SpO2 94%   Pt A&O, Afebrile, VSS with sats in mid 90s on 2L O2. Pt experienced uncontrolled pain on PCA IV morphine, MD contacted; PCA changed to IV Dilaudid 0.1mg Q10, with max of of 0.6mg/hr. Pt reported relief of pain, rested comfortably between cares for the rest of shift. Bupivicaine epidural intact, dressing C/D/I, running at 12 mL/hr. Incisions still covered with surgical dressing; small amount of serosanguinous drainage, dressing marked. Catheter into neobladder irrigated, mucous plug removed, catheter draining well, adequate urine output. Continue to monitor and follow POC.          "

## 2017-01-26 NOTE — OP NOTE
SURGEON:  Saqib Alejandro MD      PROCEDURE:  Mobilization of [place] pouch.      INDICATIONS:  The patient is a 56-year-old woman undergoing takedown of jejunostomy and anastomosis between the jejunostomy and the colon.  There is some question of whether there is a fistula between her [place] pouch and the vagina.  I have been asked to assist with ruling out a fistula as well as mobilizing the [place] pouch out of the way to keep it safe from the rest of the surgical dissection.      DESCRIPTION OF PROCEDURE:  After informed consent was obtained and preoperative antibiotics were given, the patient was taken to the operating room, placed supine on the operating table.  General anesthesia was induced and she was intubated after appropriate IV access was ensured.  We then placed her into Yellofin lithotomy and placed a couple of 4 x 4s into the vagina.  We catheterized her [place] pouch with a 16-German Fierro catheter and then instilled it with about 600 mL of saline dyed with methylene blue.  I then put pressure on her lower abdomen to try to get the pressure in the [place] pouch up as high as possible.  We then removed the 4 x 4 gauze from the vagina and there was no staining of blue.  This ruled out a vesicovaginal fistula.      Dr. Coats then proceeded with prepping and draping the abdomen in the supine position and making a midline laparotomy.  She later called me in and at this point much of the midline adhesions had been freed up and I was asked to mobilize the [place] pouch catheterizable channel as well as the pouch itself out of the way so that they could proceed with mobilization of the jejunostomy without injuring the pouch or the catheterizable channel.  I identified the catheterizable channel by feeling the catheter and then freed up additional bowel that was attached to the catheterizable channel and I mobilized the [place] pouch away from the abdominal wall, especially in the mid right abdomen.  I did  not do too much mobilization in the right lower quadrant because this was far away from their field.  The total length of time of my involvement in the case was 40 minutes.      Case was then turned back over to Dr. Coats.         JONNATHAN ANGUIANO MD             D: 2017 10:17   T: 2017 11:00   MT:       Name:     BELL IVEY   MRN:      [id number]        Account:        [id number]   :      1960           Procedure Date: 2017      Document: [id number]

## 2017-01-26 NOTE — PLAN OF CARE
Problem: Goal Outcome Summary  Goal: Goal Outcome Summary  PT-7B Recommend TCU at discharge. Pt is independent with bed mobility. Pt transferred sit->stand with SBA, pt needed UE support on IV pole to remain upright.  Pt completed LE exercise sitting EOB, pt reporting dizziness and fatigue following exercise.

## 2017-01-26 NOTE — ADDENDUM NOTE
Addendum  created 01/25/17 2001 by Jackie Vera MD    Modules edited: Orders, PRL Based Order Sets

## 2017-01-26 NOTE — ADDENDUM NOTE
Addendum  created 01/25/17 2127 by Jackie Vera MD    Modules edited: Orders, PRL Based Order Sets

## 2017-01-26 NOTE — PROGRESS NOTES
"General Surgery Progress Note    Subjective: No acute issues overnight. PCA morphine was switched to PCA dilaudid overnight due to inadequate pain management with morphine. She complains of abdominal pain this morning. Tolerating ice chips. No flatus. Mild nausea. No vomiting or shortness of breath.     Objective:   /70 mmHg  Pulse 83  Temp(Src) 98.4  F (36.9  C) (Oral)  Resp 18  Ht 1.651 m (5' 5\")  Wt 100.5 kg (221 lb 9 oz)  BMI 36.87 kg/m2  SpO2 95%    I/O last 3 completed shifts:  In: 4680 [I.V.:4180]  Out: 1865 [Urine:1465; Blood:400]    PE:  Gen: Awake, alert, NAD   CV: RRR  Resp: non-labored on room air  HEENT: NGT in place  Abd: Soft, non-distended, appropriately tender to palpation, incisions C/D/I  Ext: warm and well perfused    Labs: Reviewed    Imaging: Reviewed    A/P: Tammy Borges is a 56 y.o. woman, who is POD #1  following exploratory laparotomy with cholecystectomy, lysis of adhesions, takedown of jejunostomy, jejunocolic anastamosis, and primary repair of ventral hernia    -dilaudid PCA increased from 0.1-0.2 to 0.2-0.4  -IV Ativan 0.5mg q6h prn  -will keep NGT in today. If no or minimal output, will get AXR  -keep NPO for now   -incentive spirometry  -PPX: heparin subcutaneous TID, Protonix 40mg IV daily    Dispo: Pending tolerating advancements in diet and PO pain control      Seen and discussed with staff      Marie Owens MD  Family Medicine PGY1    "

## 2017-01-26 NOTE — PROGRESS NOTES
Urology Progress Note  01/26/2017  Admit Date: 1/25/2017    Interval History:    KAREN overnight ,  Pain controlled    Physical Exam:    Temp:  [97.2  F (36.2  C)-100.9  F (38.3  C)] 97.2  F (36.2  C)  Heart Rate:  [] 112  Resp:  [11-22] 18  BP: ()/(62-82) 105/62 mmHg  MAP:  [78 mmHg-87 mmHg] 78 mmHg  Arterial Line BP: ()/(62-70) 92/62 mmHg  SpO2:  [94 %-100 %] 94 %    I/O last 3 completed shifts:  In: 3800 [I.V.:3300]  Out: 1350 [Urine:950; Blood:400]    I/O this shift:  In: -   Out: 515 [Urine:515]      Gen: NAD, lying comfortably in bed  HEENT: NG tube to LIS  Pulm: + NC  Abdomen: soft, ATTP, nondistended, wound c/d/i mild shadowing on dressing, catheter in conduit with clear urine output  Extremities: without cyanosis or edema  Neuro: no focal deficits     Labs:    CMP  Recent Labs  Lab 01/25/17  1142 01/25/17  0933 01/25/17  0836 01/24/17  1154   POTASSIUM 3.9 2.8* 3.0* 3.4   * 109* 108* 88   BUN  --   --   --  28   CR  --   --   --  1.62*   GFRESTIMATED  --   --   --  33*     CBC  Recent Labs  Lab 01/25/17 2055 01/25/17 2002 01/25/17  1142 01/25/17  0933 01/25/17  0836 01/24/17  1154   WBC  --   --   --   --   --  6.9   HGB  --   --  13.4 12.9 12.9 13.4    Unsatisfactory specimen - Torey Schaefer 7B 01/25/17 2033 BY MA  --   --   --  252       Assessment:  Tammy Borges is a 56 year old female POD#1 s/p ventral hernia repair and cholecystectomy with history of NGB s/p indiana pouch with catheterizable channel .  Today:    Catheter can be removed from catheterizable channel today and patient can return to intermittent catheterization q4h using home catheters.     Patient seen on rounds with resident team, plan to be discussed with Dr. Alejandro.     Janet Barraza MD  PGY-4 Urology   Personal Pager [phone]       Contacting the Urology Team     Please use the following job codes to reach the Urology Team. Note that you must use an in house phone and that job codes  cannot receive text pages.     On weekdays, dial [phone] then [phone] to reach the Adult Urology resident or PA on call    On weekdays, dial [phone] then [phone] to reach the Pediatric Urology resident    On weeknights and weekends, dial [phone] then [phone] to reach the Urology resident on call (for both Adult and Pediatrics)

## 2017-01-26 NOTE — OP NOTE
DATE OF SURGERY:  01/25/2017      SURGEON:  Kezia Coats MD      ASSISTANT:  Mohan Mtz MD.  There was a second panel which consisted of Dr. Alejandro.  For further details regarding his portion of the surgery, please refer to his separately dictated operative note.      PREOPERATIVE DIAGNOSES:     1.  Ventral hernia.   2.  Chronic cholecystitis.    3.  Jejunostomy with short bowel syndrome.       POSTOPERATIVE DIAGNOSES:   1.  Ventral hernia.   2.  Chronic cholecystitis.    3.  Jejunostomy with short bowel syndrome.       PROCEDURE PERFORMED:  Exploratory laparotomy, extensive lysis of adhesions lasting greater than 3 hours, takedown of jejunostomy, jejunocolic anastomosis, colonic lavage, open cholecystectomy, primary repair of ventral hernia, biopsy of mesenteric mass, debridement of abdominal wall including skin and subcutaneous tissue for 20 x 10 cm and complex layered closure.      INDICATION FOR PROCEDURE:  Tammy Borges is a 56-year-old female who underwent a radical cystectomy for squamous cell cancer and creation of an [place] pouch many years ago.  Approximately 3 years ago she developed a small-bowel obstruction requiring surgery.  This was complicated by an anastomotic leak and a wound infection requiring several operative interventions.  Following that she presented to my clinic last year with a high output jejunostomy, was putting out approximately 5 liters per day and a large ventral hernia.  At the time we discussed ways of decreasing stoma output.  She was initially noncompliant with these recommendations.  However, over time she did make dietary changes and medication changes such that we were able to get her stoma output down to less than 2 liters per day and she was at a point where her nutritional status was much improved.        She presents today for a takedown of the jejunostomy and repair of her ventral hernia.  She had undergone preoperative imaging including upper GI small bowel  follow-through and a Gastrografin enema to determine how much bowel there was and whether there was any evidence of obstruction.  The patient also has a history of what might be a vesicovaginal fistula and this was investigated by Dr. Alejandro at the time of surgery.  She also has a history of acute cholecystitis which was treated with antibiotics and has had some issues with right upper quadrant crampy abdominal pain consistent with biliary colic as well.  Plan is to remove her gallbladder at this time as well.        DESCRIPTION OF PROCEDURE:  Following informed consent, the patient was brought to the operating room.  She was placed in supine position on the operating room table, general anesthesia was administered by the anesthesia team, she was positioned on the bed with her arms out and care taken to secure her to the bed and pad all pressure points.  At this point, Dr. Alejandro came into the room and catheterized her bladder filling it up with methylene blue in order to see if we could not detect any connection to the vagina.  There was none detected.  Again, please see his separately dictated operative note.  At this point the patient was taken out of lithotomy and placed in supine position.  Her stoma appliance was removed and her abdomen was prepped and draped in the standard sterile fashion.  We performed a timeout in which we identified the patient and discussed the procedure.        We initiated our procedure by entering her abdomen just at a point cephalad to her previous incision.  This was done with a scalpel.  Electrocautery was used to come through the soft tissue and through the midline.  Once in the midline we came down through the remainder of her adhesions.  There were extensive adhesions to the anterior abdominal wall, tracking out to the patient's right side where her [place] pouch and her jejunostomy are.  These were all taken down very carefully so as to avoid injury to the intestine.  Once  everything was down or partially down Dr. Alejandro came into the room to help us to mobilize the neobladder.  All adhesions were down.  This was an extensive lysis of adhesions not just to the abdominal wall, but also of the interloop adhesions taken down with a combination of Metzenbaum scissors and electrocautery.  We then were able to get around the jejunostomy.  This was freed up from the abdominal wall using electrocautery.  The soft tissues were dissected and ultimately the connections to the fascia were taken down and the stoma was introduced into the abdomen.  Prior to doing this we did staple off the end of the stoma.  At this point, we measured from the ligament of Treitz to the end of the jejunostomy with approximately 130 cm.  We then were able to find her colon, transverse colon which was quite filled with stool and retained Gastrografin.        Prior to performing our anastomosis we turned our attention to the cholecystectomy.  The adhesions around the liver were taken down.  The gallbladder was then taken off the liver in a top down fashion.  Once at the hilum we identified the cystic artery which was clipped and the cystic duct which was secured with a 3-0 PDS stick tie.  The gallbladder was then removed from the patient's abdomen and passed off as a specimen.  Of note, there were several nodules on her perineum, which were concerning given her history of cancer.  There was a portion of this which was resected and sent for frozen section and returned as a old vegetative material.  There was also a lymph node near the duodenum just beneath the liver which was also dissected free and sent.  It did appear to contain some purulent fluid which was also cultured.  We next performed our anastomosis.  This was a side-to-side jejunocolic anastomosis using a MOJGAN 80 stapler.  Prior to closure of the common channel the area was evacuated from the colon, we also performed a colonic lavage using a catheter and  saline to irrigate in an antegrade fashion.  We also placed some mineral oil in this.  The common channel was then closed with a third load of the stapler and the end was oversewn with 3-0 silk Lembert sutures.  We also placed several crotch sutures and closed our mesenteric defect as best as we could.        The abdomen was then irrigated.  We took care to ensure for hemostasis and then began to plan our closure.  The patient did have fascia which was quite retracted out to the side.  I closed the inside of the stoma defect using 0 Vicryl and the outside was closed with #1 PDS.  The fascia was freed up circumferentially using electrocautery anteriorly with care taken to avoid any injury to the neobladder connection.  At this point the fascia was then closed primarily.  This was quite a large hernia; however, we were able to get the midline closed primarily.  This was done with #1 PDS in a figure-of-eight fashion and it did come together very nicely without very much tension.  Following this I released from the subcutaneous tissue.  We then debrided the subcutaneous tissue, releasing all of the hernia sac.  This was sent as a specimen as well.  The debridement of the abdominal wall encompassed the skin and scar tissue as well as the hernia sac.  This was a 20 x 10 cm and was done sharply using electrocautery.  The wound was then closed with several layers of 3-0 Vicryl sutures to close dead space.  The ostomy site was closed with a pursestring 0 Vicryl and the skin was closed with staples.  The patient was awakened from anesthesia and taken to the recovery room in stable condition.  There were no immediate complications.  I was present for the entire procedure.  There were some deserosalized areas of small intestine which were oversewn with 3-0 silk.  These were inherent to the procedure and of no clinical consequence.      TUBES AND DRAINS:  No tubes and drains.      SPECIMENS:  As described in the body of the  procedure.      ESTIMATED BLOOD LOSS:  500 mL.         NITHYA LOMELI MD             D: 2017 15:46   T: 2017 21:33   MT: CT      Name:     BELL IVEY   MRN:      [id number]        Account:        [id number]   :      1960           Procedure Date: 2017      Document: [id number]

## 2017-01-26 NOTE — PROGRESS NOTES
"POST OP CHECK  01/25/2017    Tammy Borges is a 56 year old female with h/o bladder cancer (2011) and jejunostomy (2014) now POD #0 s/p exploratory laparotomy with cholecystectomy and takedown jejunostomy with jejunocolic anastomosis and primary repair of ventral hernia.    Patient reports significant pain not adequately controlled with PCA and epidural at this time. No chest pain. Mild SOB from pain. Nausea that has since improved s/p NG tube placement.      /82 mmHg  Pulse 83  Temp(Src) 97.7  F (36.5  C) (Oral)  Resp 16  Ht 1.651 m (5' 5\")  Wt 100.5 kg (221 lb 9 oz)  BMI 36.87 kg/m2  SpO2 99%  General: Alert, interactive, appears uncomfortable but in NAD.   Resp: CTAB, no crackles or wheezes, no respiratory distress.   Cardiac: Regular rate, normal rhythm, no mgr; extremities warm.  Abdomen: Soft, appropriately tender, non-distended.  Incision: dressings in place over incisions with minimal serosanguinous stains.   Extremities: No LE edema or obvious joint abnormalities.    IVF: LR, 110 mL/hr    mL    A/P: Continues to have issues with pain control, otherwise no acute post-op issues. Patient reporting relief with Dilaudid in past. (Dilaudid listed as allergy, but reaction non-anaphylactic). Will switch from Morphine to Dilaudid PCA if pain continues (patient has only received 3 g of morphine at this time). Continue plan of care per primary team. Please call with questions.     Isidro Edwards - MS3  I acted as a scribe on behalf of Aracelis Pardon     I agree with the medical student's findings, assessment and plan as written above.     Aracelis Padron MD  Surgery Resident PGY-1  Pager [phone]   "

## 2017-01-26 NOTE — PROGRESS NOTES
01/26/17 1600   Quick Adds   Type of Visit Initial PT Evaluation   Living Environment   Lives With child(abril), dependent   Living Arrangements house   Home Accessibility bed and bath are not on the first floor;stairs (2 railings present);stairs to enter home;stairs within home   Number of Stairs to Enter Home 3   Number of Stairs Within Home 10   Stair Railings at Home inside, present at both sides;outside, present at both sides   Transportation Available car   Self-Care   Dominant Hand right   Usual Activity Tolerance good   Current Activity Tolerance moderate   Equipment Currently Used at Home none   Functional Level Prior   Ambulation 0-->independent   Transferring 0-->independent   Toileting 0-->independent   Bathing 0-->independent   Dressing 0-->independent   Eating 0-->independent   Communication 0-->understands/communicates without difficulty   Swallowing 0-->swallows foods/liquids without difficulty   Cognition 0 - no cognition issues reported   Fall history within last six months yes   Which of the above functional risks had a recent onset or change? ambulation;transferring   General Information   Onset of Illness/Injury or Date of Surgery - Date 01/25/17   Referring Physician Mohan Mtz MD   Patient/Family Goals Statement Return to home    Pertinent History of Current Problem (include personal factors and/or comorbidities that impact the POC) POD #1 s/p LAPAROTOMY EXPLORATORY CHOLECYSTECTOMY HERNIORRHAPHY VENTRAL REVISE ILEAL LOOP CONDUIT, COMBINED CYSTOSCOPY, CYSTOGRAM, REPAIR FISTULA VESICOVAGINAL VAGINAL>  REVISE ILEAL LOOP CONDUIT COMBINED CYSTOSCOPY, CYSTOGRAM, REPAIR FISTULA VESICOVAGINAL VAGINAL> and placement of T8-9 epidural for analgesia.    Precautions/Limitations fall precautions;abdominal precautions   Heart Disease Risk Factors Overweight;Gender   General Observations Pt has multiple attachments (NG tube, catheter, patient controlled pain relief   Cognitive Status  "Examination   Level of Consciousness alert   Follows Commands and Answers Questions 100% of the time   Personal Safety and Judgment intact   Memory intact   Pain Assessment   Patient Currently in Pain Yes, see Vital Sign flowsheet   Posture    Posture Not impaired   Range of Motion (ROM)   ROM Comment B LE WNL    Strength   Strength Comments B LE grossly 4/5   Bed Mobility   Bed Mobility Comments Independent  with all bed mobility   Transfer Skills   Transfer Comments SBA with all transfers   Balance   Balance Comments Decrease balance, pt needed UE support to remain upright in standing   General Therapy Interventions   Planned Therapy Interventions balance training;gait training;strengthening;transfer training;home program guidelines;progressive activity/exercise   Clinical Impression   Criteria for Skilled Therapeutic Intervention yes, treatment indicated   PT Diagnosis impaired functional activity   Influenced by the following impairments decreased LE strength, abdominal strength   Functional limitations due to impairments reduced mobility and transfers   Clinical Presentation Stable/Uncomplicated   Clinical Presentation Rationale clinical judgement   Clinical Decision Making (Complexity) Low complexity   Therapy Frequency` other (see comments)   Predicted Duration of Therapy Intervention (days/wks) 6x/week   Anticipated Discharge Disposition Home with Assist   Risk & Benefits of therapy have been explained Yes   Patient, Family & other staff in agreement with plan of care Yes   [facility] AM-PAC  \"6 Clicks\" V.2 Basic Mobility Inpatient Short Form   1. Turning from your back to your side while in a flat bed without using bedrails? 4 - None   2. Moving from lying on your back to sitting on the side of a flat bed without using bedrails? 4 - None   3. Moving to and from a bed to a chair (including a wheelchair)? 3 - A Little   4. Standing up from a chair using your arms (e.g., wheelchair, or bedside chair)? 4 " - None   5. To walk in hospital room? 3 - A Little   6. Climbing 3-5 steps with a railing? 2 - A Lot   Basic Mobility Raw Score (Score out of 24.Lower scores equate to lower levels of function) 20   Total Evaluation Time   Total Evaluation Time (Minutes) 10

## 2017-01-26 NOTE — PROGRESS NOTES
REGIONAL ANESTHESIA PAIN SERVICE EPIDURAL NOTE  SUBJECTIVE:   Interval History: Pt reports poor pain control, diffuse sharp across upper abdomen especially with deep breathing, intensity rating 7-8/10.  Pt states pain is better since switch from PCA Morphine to Dilaudid, but she can't tell if epidural is helping.  Denies any weakness, paresthesias, circumoral numbness, metallic taste or tinnitus. Patient is currently with intermittent nauseam, NPO with NG     Clinically Aligned Pain Assessment (CAPA):  Comfort (How is your pain?): Tolerable with discomfort  Change in Pain (Since your last medication/intervention?): Getting better  Pain Control (How are your pain treatments working?):  Inadequate pain control  Functioning (Are you able to do activities to get better?) : Pain keeps me from doing most of what I need to do      Anticoagulation:    heparin sodium PF injection 5,000 Units 5,000 Units, SC, Q8H Ordered              OBJECTIVE:  Diagnostics:  WBC      6.9   1/24/2017  RBC     4.57   1/24/2017  HGB     13.4   1/25/2017  HCT     40.7   1/24/2017  PLT      200   1/25/2017    INR     1.10   1/25/2017    Vitals:    Temp:  [97.2  F (36.2  C)-100.9  F (38.3  C)] 97.2  F (36.2  C)  Heart Rate:  [] 112  Resp:  [12-18] 18  BP: ()/(62-82) 105/62 mmHg  MAP:  [78 mmHg-87 mmHg] 78 mmHg  Arterial Line BP: ()/(62-70) 92/62 mmHg  SpO2:  [94 %-100 %] 94 %    Exam:    Strength 5/5 and symmetric grossly in bilateral LE   Level to cold sensation: Bilateral T8-12   Epidural site with dressing c/d/i, no tenderness, erythema, heme, edema      ASSESSMENT/PLAN:    Tammy Borges is a 56 year old female POD #1 s/p LAPAROTOMY EXPLORATORY CHOLECYSTECTOMY HERNIORRHAPHY VENTRAL REVISE ILEAL LOOP CONDUIT, COMBINED CYSTOSCOPY, CYSTOGRAM, REPAIR FISTULA VESICOVAGINAL VAGINAL>  REVISE ILEAL LOOP CONDUIT COMBINED CYSTOSCOPY, CYSTOGRAM, REPAIR FISTULA VESICOVAGINAL VAGINAL> and placement of T8-9 epidural for analgesia.  Pt  receiving suboptimal analgesia with current PCA Dilaudid 0.1mg Q 10 min lockout and epidural infusion Bupivacaine 0.0625% at 12mL/hour.  Adequate sensory block covering all but superior 1/4 portion of incision.  Pt has not been out of bed yet.  No weakness or paresthesias, .  No evidence of adverse side effects related to local anesthetic.    - agree with plan to increase PCA Dilaudid to 0.2mg Q 10 min lockout interval  - 0955 local anesthetic bolus PF bupivacaine 0.125%  5 mL via epidural catheter.  BP and P monitoring Q 5 min x 30 min - contact if MAP less than 60 - bedside nurse aware.  - patient can be evaluated to receive local anesthetic bolus Q 12 hr PRN, bedside nurse must page RAPS to request bolus  - increase epidural bupivacaine infusion to 0.125% and continue at 12mL/hour   - will continue to follow and adjust as needed  - discussed plan with attending anesthesiologist        KAREEM [facility]  Regional Anesthesia Pain Service  1/26/2017 8:09 AM    24 hour Job Code Pager.  For in-house use only.     Dial * * [phone] and  Mobile:  [phone]  [place]: [phone]  Peds:  [phone]  Then enter call-back number  May text page using [employer], but NOT [employer].

## 2017-01-26 NOTE — PLAN OF CARE
Problem: Goal Outcome Summary  Goal: Goal Outcome Summary  Outcome: Improving  Filed Vitals:     01/26/17 0358 01/26/17 0800 01/26/17 1016 01/26/17 1259   BP: 105/62 101/70 103/68 108/68   Pulse:           Temp: 97.2  F (36.2  C) 98.4  F (36.9  C)   95.9  F (35.5  C)   TempSrc: Axillary Oral   Axillary   Resp: 18 18   18   Height:           Weight:           SpO2: 94% 95% 92% 93%   Pt has paula in pain early this shift. Pain team assessed pt and changed he saw pt increased the dose in the solution  At the rate of 12/hr and also PCA dose increased to 0.2mg every 10min.  Pain well managed and pt.  Has been up ambulated in room and then in halls.  Did well. No NAusea reported  NG 0 output, irrigated per order. No change.  Low urine output had about 200cc for the shift.  MIV infusing at 110/hr.  No passing gas  But burping.  Continue to follow up per plan of care.  Midline Incision  Noted with bright red blood Dr Padron notified came up and changed dressing. Bladder irrigation done moderate amount cloths noted and urine output increase. Continue to follow up per plan of care.

## 2017-01-26 NOTE — ADDENDUM NOTE
Addendum  created 01/25/17 1805 by Piyush Rojas MD    Modules edited: Anesthesia Review and Sign Navigator Section, Notes Section    Notes Section:  File: [id number]

## 2017-01-27 ENCOUNTER — APPOINTMENT (OUTPATIENT)
Dept: PHYSICAL THERAPY | Facility: CLINIC | Age: 57
DRG: 329 | End: 2017-01-27
Attending: SURGERY
Payer: COMMERCIAL

## 2017-01-27 LAB
ANION GAP SERPL CALCULATED.3IONS-SCNC: 7 MMOL/L (ref 3–14)
BACTERIA SPEC CULT: ABNORMAL
BUN SERPL-MCNC: 28 MG/DL (ref 7–30)
CALCIUM SERPL-MCNC: 8.4 MG/DL (ref 8.5–10.1)
CHLORIDE SERPL-SCNC: 111 MMOL/L (ref 94–109)
CO2 SERPL-SCNC: 24 MMOL/L (ref 20–32)
CREAT SERPL-MCNC: 1.88 MG/DL (ref 0.52–1.04)
ERYTHROCYTE [DISTWIDTH] IN BLOOD BY AUTOMATED COUNT: 15.3 % (ref 10–15)
GFR SERPL CREATININE-BSD FRML MDRD: 28 ML/MIN/1.7M2
GLUCOSE SERPL-MCNC: 114 MG/DL (ref 70–99)
HCT VFR BLD AUTO: 35.3 % (ref 35–47)
HGB BLD-MCNC: 10.7 G/DL (ref 11.7–15.7)
Lab: ABNORMAL
MAGNESIUM SERPL-MCNC: 2 MG/DL (ref 1.6–2.3)
MCH RBC QN AUTO: 28.9 PG (ref 26.5–33)
MCHC RBC AUTO-ENTMCNC: 30.3 G/DL (ref 31.5–36.5)
MCV RBC AUTO: 95 FL (ref 78–100)
MICRO REPORT STATUS: ABNORMAL
MICROORGANISM SPEC CULT: ABNORMAL
PLATELET # BLD AUTO: 196 10E9/L (ref 150–450)
POTASSIUM SERPL-SCNC: 4.2 MMOL/L (ref 3.4–5.3)
RBC # BLD AUTO: 3.7 10E12/L (ref 3.8–5.2)
SODIUM SERPL-SCNC: 143 MMOL/L (ref 133–144)
SPECIMEN SOURCE: ABNORMAL
WBC # BLD AUTO: 6.8 10E9/L (ref 4–11)

## 2017-01-27 PROCEDURE — 40000802 ZZH SITE CHECK

## 2017-01-27 PROCEDURE — 97110 THERAPEUTIC EXERCISES: CPT | Mod: GP | Performed by: PHYSICAL THERAPIST

## 2017-01-27 PROCEDURE — 25000125 ZZHC RX 250: Performed by: NURSE PRACTITIONER

## 2017-01-27 PROCEDURE — 80048 BASIC METABOLIC PNL TOTAL CA: CPT | Performed by: SURGERY

## 2017-01-27 PROCEDURE — 12000008 ZZH R&B INTERMEDIATE UMMC

## 2017-01-27 PROCEDURE — 97530 THERAPEUTIC ACTIVITIES: CPT | Mod: GP | Performed by: PHYSICAL THERAPIST

## 2017-01-27 PROCEDURE — 40000193 ZZH STATISTIC PT WARD VISIT: Performed by: PHYSICAL THERAPIST

## 2017-01-27 PROCEDURE — 25000132 ZZH RX MED GY IP 250 OP 250 PS 637: Performed by: STUDENT IN AN ORGANIZED HEALTH CARE EDUCATION/TRAINING PROGRAM

## 2017-01-27 PROCEDURE — 25000125 ZZHC RX 250: Performed by: STUDENT IN AN ORGANIZED HEALTH CARE EDUCATION/TRAINING PROGRAM

## 2017-01-27 PROCEDURE — 25800025 ZZH RX 258: Performed by: STUDENT IN AN ORGANIZED HEALTH CARE EDUCATION/TRAINING PROGRAM

## 2017-01-27 PROCEDURE — 83735 ASSAY OF MAGNESIUM: CPT | Performed by: SURGERY

## 2017-01-27 PROCEDURE — 36415 COLL VENOUS BLD VENIPUNCTURE: CPT | Performed by: SURGERY

## 2017-01-27 PROCEDURE — 85027 COMPLETE CBC AUTOMATED: CPT | Performed by: SURGERY

## 2017-01-27 PROCEDURE — 40000558 ZZH STATISTIC CVC DRESSING CHANGE

## 2017-01-27 PROCEDURE — 25000128 H RX IP 250 OP 636: Performed by: NURSE PRACTITIONER

## 2017-01-27 RX ORDER — BUPIVACAINE HYDROCHLORIDE 2.5 MG/ML
2.5 INJECTION, SOLUTION EPIDURAL; INFILTRATION; INTRACAUDAL ONCE
Status: COMPLETED | OUTPATIENT
Start: 2017-01-27 | End: 2017-01-27

## 2017-01-27 RX ADMIN — LEVOTHYROXINE SODIUM ANHYDROUS 125 MCG: 100 INJECTION, POWDER, LYOPHILIZED, FOR SOLUTION INTRAVENOUS at 08:41

## 2017-01-27 RX ADMIN — HEPARIN SODIUM 5000 UNITS: 5000 INJECTION, SOLUTION INTRAVENOUS; SUBCUTANEOUS at 14:22

## 2017-01-27 RX ADMIN — HYDROMORPHONE HYDROCHLORIDE: 10 INJECTION, SOLUTION INTRAMUSCULAR; INTRAVENOUS; SUBCUTANEOUS at 14:23

## 2017-01-27 RX ADMIN — BUPIVACAINE HYDROCHLORIDE 6.25 MG: 2.5 INJECTION, SOLUTION EPIDURAL; INFILTRATION; INTRACAUDAL at 10:12

## 2017-01-27 RX ADMIN — BUPIVACAINE HYDROCHLORIDE: 7.5 INJECTION, SOLUTION EPIDURAL; RETROBULBAR at 22:13

## 2017-01-27 RX ADMIN — SODIUM PHOSPHATE 1 ENEMA: 7; 19 ENEMA RECTAL at 17:26

## 2017-01-27 RX ADMIN — PANTOPRAZOLE SODIUM 40 MG: 40 INJECTION, POWDER, FOR SOLUTION INTRAVENOUS at 08:41

## 2017-01-27 RX ADMIN — HEPARIN SODIUM 5000 UNITS: 5000 INJECTION, SOLUTION INTRAVENOUS; SUBCUTANEOUS at 21:51

## 2017-01-27 RX ADMIN — SODIUM CHLORIDE, POTASSIUM CHLORIDE, SODIUM LACTATE AND CALCIUM CHLORIDE: 600; 310; 30; 20 INJECTION, SOLUTION INTRAVENOUS at 06:04

## 2017-01-27 RX ADMIN — BUPIVACAINE HYDROCHLORIDE: 7.5 INJECTION, SOLUTION EPIDURAL; RETROBULBAR at 03:15

## 2017-01-27 RX ADMIN — HEPARIN SODIUM 5000 UNITS: 5000 INJECTION, SOLUTION INTRAVENOUS; SUBCUTANEOUS at 05:58

## 2017-01-27 NOTE — PROGRESS NOTES
"General Surgery Progress Note    Subjective: No acute issues overnight. Pain adequately controlled. NPO with ice chips. No flatus, no BM. Mild nausea. Denies fevers, chills, CP, SOB, or vomiting. Walked to doorway x1 yesterday.     Objective:   /70 mmHg  Pulse 83  Temp(Src) 100.2  F (37.9  C) (Oral)  Resp 18  Ht 1.651 m (5' 5\")  Wt 100.5 kg (221 lb 9 oz)  BMI 36.87 kg/m2  SpO2 93%    I/O:  I/O last 3 completed shifts:  In: 2900 [I.V.:1870; NG/GT:30; IV Piggyback:1000]  Out: 2740 [Urine:1340; Emesis/NG output:1400]    PE:  Gen: Awake, alert, NAD   HEENT: NGT to LIS   CV: Normal rate, regular rhythm  Resp: non-labored at rest, CTAB  Abd: Soft, non distended. Appropriately tender to palpation, greatest in RUQ. Incisions C/D/I, grey discharge around fuller catheter in neobladder.   Ext: warm and well perfused    Labs: morning labs pending     Imaging: Reviewed. NG tube and PICC in appropriate position.     A/P: Tammy Borges is a 56 year old female, who is POD #2  following following exploratory laparotomy with cholecystectomy, lysis of adhesions, takedown of jejunostomy, jejunocolic anastamosis, and primary repair of ventral hernia     -dilaudid PCA at 0.2-0.4  -IV Ativan 0.5mg q6h prn  -NPO with ice chips, NGT   -incentive spirometry  -PPX: heparin subcutaneous TID, Protonix 40mg IV daily  -ambulate     Dispo: pending advancements in diet and oral pain control     Seen and discussed with staff    Isidro Edwards, MS3    -----------------------------------------    Agree with excellent medical student note above    A/P  Tammy Borges is a 55 yo F, POD #2 from exploratory laparotomy with cholecystectomy, lysis of adhesions, takedown of jejunostomy, jejunocolic anastamosis, and primary repair of ventral hernia    - wean off supplemental O2  - fleet enema BID   - keep NGT today  - incentive spirometry  - dilaudid PCA 0.2-0.4  - IV ativan 0.5mg q6h prn      Dispo: pending advancement in diet and PO pain " control      Marie Owens MD  Family Medicine PGY1

## 2017-01-27 NOTE — ANESTHESIA POST-OP FOLLOW-UP NOTE
"REGIONAL ANESTHESIA PAIN SERVICE EPIDURAL NOTE  SUBJECTIVE:    Interval History: Pt reports increased pain today and feels loopy with higher doses of PCA Dilaudid.  She reports excellent relief with local anesthetic bolus given yesterday and requests another bolus today.  Denies any weakness, paresthesias, circumoral numbness, metallic taste or tinnitus.  Pt ambulating with assistance.  Patient is NPO with NG, currently without nausea.                  Clinically Aligned Pain Assessment (CAPA):  Comfort (How is your pain?): Tolerable with discomfort  Change in Pain (Since your last medication/intervention?): Getting worse  Pain Control (How are your pain treatments working?):  Partially effective pain control  Functioning (Are you able to do activities to get better?) : Can do most things, but pain gets in the way of some                 Numerical Rating Scale:     Reports pain 6/10 prior to bolus and 3/10, \"much better\" following bolus        Anticoagulation:    heparin sodium PF injection 5,000 Units  5,000 Units, SC, Q8H  Given: 01/27 0558              OBJECTIVE:  Diagnostics:  WBC      6.8   1/27/2017  RBC     3.70   1/27/2017  HGB     10.7   1/27/2017  HCT     35.3   1/27/2017  PLT      196   1/27/2017    INR     1.10   1/25/2017    Vitals:              Temp:  [95.9  F (35.5  C)-100.2  F (37.9  C)] 97.3  F (36.3  C)  Heart Rate:  [] 96  Resp:  [18-20] 20  BP: ()/(60-76) 96/60 mmHg  SpO2:  [90 %-97 %] 93 %    Exam:                Strength 5/5 and symmetric grossly in bilateral LE              Epidural site with dressing c/d/i, no tenderness, erythema, heme, edema      ASSESSMENT/PLAN:     Tammy Borges is a 56 year old female POD #2 s/p LAPAROTOMY EXPLORATORY CHOLECYSTECTOMY HERNIORRHAPHY VENTRAL REVISE ILEAL LOOP CONDUIT, COMBINED CYSTOSCOPY, CYSTOGRAM, REPAIR FISTULA VESICOVAGINAL VAGINAL>  REVISE ILEAL LOOP CONDUIT COMBINED CYSTOSCOPY, CYSTOGRAM, REPAIR FISTULA VESICOVAGINAL VAGINAL> and placement of " T8-9 epidural for analgesia.  Pt receiving adequate analgesia with epidural infusion Bupivacaine 0.125% at 12mL/hour and local anesthetic bolus PRN.  Pt ambulating without difficulty.  No weakness or paresthesias, adequate sensory block.  No evidence of adverse side effects related to local anesthetic.  Time of first ambulation was yesterday.    - patient can be evaluated to receive local anesthetic bolus Q 12 hr PRN, bedside nurse must page RAPS to request bolus  - continue current epidural infusion at 12mL/hour    - will continue to follow and adjust as needed    Blayne Moralez DO    [facility]  Pain Management  Department of Anesthesiology      24 hour Job Code Pager.  For in-house use only.      Dial * * [phone] and  [place]:  [phone]  [place]: [phone]  Peds:  [phone]  Then enter call-back number  May text page using [employer] ([employer]), but NOT [employer].

## 2017-01-27 NOTE — PLAN OF CARE
Problem: Goal Outcome Summary  Goal: Goal Outcome Summary  Outcome: No Change  Pt afebrile, vitals stable, pt with midline incision- serosanguinous drainage on dressing, binder in place. NG to LIWS- moderate green drainage out. Neobladder intact- marginal urine output- irrigated once this shift. Epidural at 12 ml/hr and PCA at 0.2mh/hr- fair pain control. Pt endorse flatus, no BM, remains NPO except ice chips. Encourage activity.

## 2017-01-27 NOTE — PLAN OF CARE
Problem: Goal Outcome Summary  Goal: Goal Outcome Summary  Outcome: No Change  Pt unable to find call-light that was in bed with her and became very anxious and called out. O2 sats 90 and pt tachy. O2 increased to 4 L. Pt calmed down and O2 sats 96%. Good U/O after irrigationof bridget bladder and NG output improved after irrigated by previous nurse. Pt able to move self in bed. Lungs diminished in bases-encouraged pt to use IS. Good pain management with PCA and Epidural.

## 2017-01-27 NOTE — ADDENDUM NOTE
Addendum  created 01/27/17 1524 by Blayne Moralez,     Modules edited: Notes Section    Notes Section:  File: [id number]

## 2017-01-27 NOTE — PROGRESS NOTES
"REGIONAL ANESTHESIA PAIN SERVICE EPIDURAL NOTE  SUBJECTIVE:   Interval History: Pt reports increased pain today and feels loopy with higher doses of PCA Dilaudid.  She reports excellent relief with local anesthetic bolus given yesterday and requests another bolus today.  Denies any weakness, paresthesias, circumoral numbness, metallic taste or tinnitus.  Pt ambulating with assistance.  Patient is NPO with NG, currently without nausea.       Clinically Aligned Pain Assessment (CAPA):  Comfort (How is your pain?): Tolerable with discomfort  Change in Pain (Since your last medication/intervention?): Getting worse  Pain Control (How are your pain treatments working?):  Partially effective pain control  Functioning (Are you able to do activities to get better?) : Can do most things, but pain gets in the way of some      Numerical Rating Scale:    Reports pain 6/10 prior to bolus and 3/10, \"much better\" following bolus        Anticoagulation:    heparin sodium PF injection 5,000 Units 5,000 Units, SC, Q8H Given: 01/27 0558            OBJECTIVE:  Diagnostics:  WBC      6.8   1/27/2017  RBC     3.70   1/27/2017  HGB     10.7   1/27/2017  HCT     35.3   1/27/2017  PLT      196   1/27/2017    INR     1.10   1/25/2017    Vitals:    Temp:  [95.9  F (35.5  C)-100.2  F (37.9  C)] 97.3  F (36.3  C)  Heart Rate:  [] 96  Resp:  [18-20] 20  BP: ()/(60-76) 96/60 mmHg  SpO2:  [90 %-97 %] 93 %    Exam:    Strength 5/5 and symmetric grossly in bilateral LE   Epidural site with dressing c/d/i, no tenderness, erythema, heme, edema      ASSESSMENT/PLAN:    Tammy Borges is a 56 year old female POD #2 s/p LAPAROTOMY EXPLORATORY CHOLECYSTECTOMY HERNIORRHAPHY VENTRAL REVISE ILEAL LOOP CONDUIT, COMBINED CYSTOSCOPY, CYSTOGRAM, REPAIR FISTULA VESICOVAGINAL VAGINAL>  REVISE ILEAL LOOP CONDUIT COMBINED CYSTOSCOPY, CYSTOGRAM, REPAIR FISTULA VESICOVAGINAL VAGINAL> and placement of T8-9 epidural for analgesia.  Pt receiving adequate analgesia " with epidural infusion Bupivacaine 0.125% at 12mL/hour and local anesthetic bolus PRN.  Pt ambulating without difficulty.  No weakness or paresthesias, adequate sensory block.  No evidence of adverse side effects related to local anesthetic.  Time of first ambulation was yesterday.    - 1015 local anesthetic bolus PF bupivacaine 0.125%, 5mL via epidural catheter.  BP and P monitoring Q 5 min x 30 min, call if MAP less than 60 - bedside nurse aware.  - patient can be evaluated to receive local anesthetic bolus Q 12 hr PRN, bedside nurse must page RAPS to request bolus  - continue current epidural infusion at 12mL/hour   - will continue to follow and adjust as needed  - discussed plan with attending anesthesiologist        KAREEM Garcia [facility]  Regional Anesthesia Pain Service  1/27/2017 12:44 PM    24 hour Job Code Pager.  For in-house use only.     Dial * * [phone] and  [place]:  [phone]  [place]: [phone]  Peds:  [phone]  Then enter call-back number  May text page using [employer], but NOT [employer].

## 2017-01-27 NOTE — PLAN OF CARE
Problem: Goal Outcome Summary  Goal: Goal Outcome Summary  OT 7B: eval attempted. Pt. unable to tolerate 2/2 dizziness/nausea at time of attempt; RN aware and addressing.

## 2017-01-27 NOTE — ADDENDUM NOTE
Addendum  created 01/27/17 0831 by Blayne Moralez,     Modules edited: Notes Section    Notes Section:  File: [id number]

## 2017-01-27 NOTE — ANESTHESIA POST-OP FOLLOW-UP NOTE
REGIONAL ANESTHESIA PAIN SERVICE EPIDURAL NOTE  SUBJECTIVE:    Interval History: Pt reports improved pain control after the local anesthetic bolus given this morning.   She is ambulating in the [place] with assistance.  Pt states pain is better since switch from PCA Morphine to Dilaudid.  Denies any weakness, paresthesias, circumoral numbness, metallic taste or tinnitus. Patient is currently with intermittent nausea, NPO with NG                Clinically Aligned Pain Assessment (CAPA):  Comfort (How is your pain?): Tolerable with discomfort  Change in Pain (Since your last medication/intervention?): Getting better  Pain Control (How are your pain treatments working?):  Inadequate pain control  Functioning (Are you able to do activities to get better?) : Pain keeps me from doing some of what I need to do      Anticoagulation:    heparin sodium PF injection 5,000 Units  5,000 Units, SC, Q8H  Ordered                OBJECTIVE:  Diagnostics:  WBC      6.9   1/24/2017  RBC     4.57   1/24/2017  HGB     13.4   1/25/2017  HCT     40.7   1/24/2017  PLT      200   1/25/2017    INR     1.10   1/25/2017    Vitals:              Temp:  [97.2  F (36.2  C)-100.9  F (38.3  C)] 97.2  F (36.2  C)  Heart Rate:  [] 112  Resp:  [12-18] 18  BP: ()/(62-82) 105/62 mmHg  MAP:  [78 mmHg-87 mmHg] 78 mmHg  Arterial Line BP: ()/(62-70) 92/62 mmHg  SpO2:  [94 %-100 %] 94 %    Exam:                Strength 5/5 and symmetric grossly in bilateral LE              Level to cold sensation: Bilateral T8-12              Epidural site with dressing c/d/i, no tenderness, erythema, heme, edema      ASSESSMENT/PLAN:     Tammy Borges is a 56 year old female POD #1 s/p LAPAROTOMY EXPLORATORY CHOLECYSTECTOMY HERNIORRHAPHY VENTRAL REVISE ILEAL LOOP CONDUIT, COMBINED CYSTOSCOPY, CYSTOGRAM, REPAIR FISTULA VESICOVAGINAL VAGINAL>  REVISE ILEAL LOOP CONDUIT COMBINED CYSTOSCOPY, CYSTOGRAM, REPAIR FISTULA VESICOVAGINAL VAGINAL> and placement of T8-9  epidural for analgesia which is infusing Bupivacaine 0.0625% at 12mL/hour.  Adequate sensory block covering all but superior 1/4 portion of incision.  No weakness or paresthesias, .  No evidence of adverse side effects related to local anesthetic.      - continue PCA Dilaudid to 0.2mg Q 10 min lockout interval  - patient can be evaluated to receive local anesthetic bolus Q 12 hr PRN, bedside nurse must page RAPS to request bolus  - increase epidural bupivacaine infusion to 0.125% and continue at 12mL/hour    - will continue to follow and adjust as needed    Blayne Moralez DO    [facility]  Pain Management  Department of Anesthesiology      24 hour Job Code Pager.  For in-house use only.      Dial * * [phone] and  [place]:  [phone]  [place]: [phone]  Peds:  [phone]  Then enter call-back number  May text page using [employer], but NOT [employer].

## 2017-01-27 NOTE — PLAN OF CARE
"Problem: Goal Outcome Summary  Goal: Goal Outcome Summary  PT 7B: Pt reports baseline chronic balance deficits, states she \"furniture surfs\" often. Pt is able to ambulate within the room with SBA with and without the IV pole, mild unsteadiness and needing cues for balance. Pt given LE strengthening exercises of repeated sit<>stand as pt stating she feels mild fatigue after surgery. Needs cues to adhere to abdominal precautions with bed mobility. SpO2 >91% on RA throughout. Pt is presenting with some weakness and balance deficits; however, pt appears to be close to her baseline. With continued therapy and progression post-op would anticipate that the pt could discharge home with assistance as needed and OP PT to address baseline strength/balance deficits. Pt very motivated to return home.         "

## 2017-01-27 NOTE — PLAN OF CARE
Problem: Goal Outcome Summary  Goal: Goal Outcome Summary  Outcome: No Change  Pt transferred to 7B from 4A overnight, he is afebrile, vitals stable, CMS and Neuros intact except for baseline numbness/tingling LE. Pt up to bathroom with standby assist of one and walker, had bm and voided- did no save. PIV saline locked. Pt on regular diet. Pt with elevated creat- would benefit from Nephrology consult.Denies any nausea. Cont with plan of care.

## 2017-01-28 ENCOUNTER — APPOINTMENT (OUTPATIENT)
Dept: OCCUPATIONAL THERAPY | Facility: CLINIC | Age: 57
DRG: 329 | End: 2017-01-28
Attending: SURGERY
Payer: COMMERCIAL

## 2017-01-28 LAB
ACID FAST STN SPEC QL: NORMAL
ANION GAP SERPL CALCULATED.3IONS-SCNC: 12 MMOL/L (ref 3–14)
BASOPHILS # BLD AUTO: 0 10E9/L (ref 0–0.2)
BASOPHILS NFR BLD AUTO: 0.3 %
BUN SERPL-MCNC: 23 MG/DL (ref 7–30)
CALCIUM SERPL-MCNC: 8.6 MG/DL (ref 8.5–10.1)
CHLORIDE SERPL-SCNC: 114 MMOL/L (ref 94–109)
CO2 SERPL-SCNC: 21 MMOL/L (ref 20–32)
CREAT SERPL-MCNC: 1.49 MG/DL (ref 0.52–1.04)
DIFFERENTIAL METHOD BLD: ABNORMAL
EOSINOPHIL # BLD AUTO: 0.4 10E9/L (ref 0–0.7)
EOSINOPHIL NFR BLD AUTO: 11.1 %
ERYTHROCYTE [DISTWIDTH] IN BLOOD BY AUTOMATED COUNT: 15.1 % (ref 10–15)
GFR SERPL CREATININE-BSD FRML MDRD: 36 ML/MIN/1.7M2
GLUCOSE SERPL-MCNC: 88 MG/DL (ref 70–99)
HCT VFR BLD AUTO: 33.3 % (ref 35–47)
HGB BLD-MCNC: 10.1 G/DL (ref 11.7–15.7)
IMM GRANULOCYTES # BLD: 0 10E9/L (ref 0–0.4)
IMM GRANULOCYTES NFR BLD: 0.3 %
LYMPHOCYTES # BLD AUTO: 0.8 10E9/L (ref 0.8–5.3)
LYMPHOCYTES NFR BLD AUTO: 21 %
MCH RBC QN AUTO: 28.9 PG (ref 26.5–33)
MCHC RBC AUTO-ENTMCNC: 30.3 G/DL (ref 31.5–36.5)
MCV RBC AUTO: 95 FL (ref 78–100)
MICRO REPORT STATUS: NORMAL
MONOCYTES # BLD AUTO: 0.7 10E9/L (ref 0–1.3)
MONOCYTES NFR BLD AUTO: 18.2 %
NEUTROPHILS # BLD AUTO: 1.9 10E9/L (ref 1.6–8.3)
NEUTROPHILS NFR BLD AUTO: 49.1 %
NRBC # BLD AUTO: 0 10*3/UL
NRBC BLD AUTO-RTO: 0 /100
PLATELET # BLD AUTO: 218 10E9/L (ref 150–450)
POTASSIUM SERPL-SCNC: 3.4 MMOL/L (ref 3.4–5.3)
RBC # BLD AUTO: 3.5 10E12/L (ref 3.8–5.2)
SODIUM SERPL-SCNC: 147 MMOL/L (ref 133–144)
SPECIMEN SOURCE: NORMAL
WBC # BLD AUTO: 4 10E9/L (ref 4–11)

## 2017-01-28 PROCEDURE — 25000125 ZZHC RX 250: Performed by: NURSE PRACTITIONER

## 2017-01-28 PROCEDURE — 25000128 H RX IP 250 OP 636: Performed by: NURSE PRACTITIONER

## 2017-01-28 PROCEDURE — 80048 BASIC METABOLIC PNL TOTAL CA: CPT | Performed by: STUDENT IN AN ORGANIZED HEALTH CARE EDUCATION/TRAINING PROGRAM

## 2017-01-28 PROCEDURE — 12000008 ZZH R&B INTERMEDIATE UMMC

## 2017-01-28 PROCEDURE — 36415 COLL VENOUS BLD VENIPUNCTURE: CPT | Performed by: STUDENT IN AN ORGANIZED HEALTH CARE EDUCATION/TRAINING PROGRAM

## 2017-01-28 PROCEDURE — 85025 COMPLETE CBC W/AUTO DIFF WBC: CPT | Performed by: STUDENT IN AN ORGANIZED HEALTH CARE EDUCATION/TRAINING PROGRAM

## 2017-01-28 PROCEDURE — 25000128 H RX IP 250 OP 636: Performed by: SURGERY

## 2017-01-28 PROCEDURE — 40000802 ZZH SITE CHECK

## 2017-01-28 PROCEDURE — 40000133 ZZH STATISTIC OT WARD VISIT

## 2017-01-28 PROCEDURE — 25000132 ZZH RX MED GY IP 250 OP 250 PS 637: Performed by: STUDENT IN AN ORGANIZED HEALTH CARE EDUCATION/TRAINING PROGRAM

## 2017-01-28 PROCEDURE — 97535 SELF CARE MNGMENT TRAINING: CPT | Mod: GO

## 2017-01-28 PROCEDURE — 25800025 ZZH RX 258: Performed by: STUDENT IN AN ORGANIZED HEALTH CARE EDUCATION/TRAINING PROGRAM

## 2017-01-28 PROCEDURE — 97165 OT EVAL LOW COMPLEX 30 MIN: CPT | Mod: GO

## 2017-01-28 PROCEDURE — 25000125 ZZHC RX 250: Performed by: STUDENT IN AN ORGANIZED HEALTH CARE EDUCATION/TRAINING PROGRAM

## 2017-01-28 RX ADMIN — SODIUM CHLORIDE, POTASSIUM CHLORIDE, SODIUM LACTATE AND CALCIUM CHLORIDE: 600; 310; 30; 20 INJECTION, SOLUTION INTRAVENOUS at 00:43

## 2017-01-28 RX ADMIN — SODIUM PHOSPHATE 1 ENEMA: 7; 19 ENEMA RECTAL at 09:38

## 2017-01-28 RX ADMIN — ONDANSETRON 4 MG: 2 INJECTION INTRAMUSCULAR; INTRAVENOUS at 00:34

## 2017-01-28 RX ADMIN — HEPARIN SODIUM 5000 UNITS: 5000 INJECTION, SOLUTION INTRAVENOUS; SUBCUTANEOUS at 09:37

## 2017-01-28 RX ADMIN — BUPIVACAINE HYDROCHLORIDE: 7.5 INJECTION, SOLUTION EPIDURAL; RETROBULBAR at 18:43

## 2017-01-28 RX ADMIN — HYDROMORPHONE HYDROCHLORIDE: 10 INJECTION, SOLUTION INTRAMUSCULAR; INTRAVENOUS; SUBCUTANEOUS at 16:31

## 2017-01-28 RX ADMIN — HYDROMORPHONE HYDROCHLORIDE: 10 INJECTION, SOLUTION INTRAMUSCULAR; INTRAVENOUS; SUBCUTANEOUS at 13:49

## 2017-01-28 RX ADMIN — HEPARIN SODIUM 5000 UNITS: 5000 INJECTION, SOLUTION INTRAVENOUS; SUBCUTANEOUS at 16:36

## 2017-01-28 RX ADMIN — PANTOPRAZOLE SODIUM 40 MG: 40 INJECTION, POWDER, FOR SOLUTION INTRAVENOUS at 09:38

## 2017-01-28 RX ADMIN — LEVOTHYROXINE SODIUM ANHYDROUS 125 MCG: 100 INJECTION, POWDER, LYOPHILIZED, FOR SOLUTION INTRAVENOUS at 09:43

## 2017-01-28 RX ADMIN — SODIUM CHLORIDE, POTASSIUM CHLORIDE, SODIUM LACTATE AND CALCIUM CHLORIDE: 600; 310; 30; 20 INJECTION, SOLUTION INTRAVENOUS at 19:04

## 2017-01-28 ASSESSMENT — ACTIVITIES OF DAILY LIVING (ADL)
PREVIOUS_RESPONSIBILITIES: MEAL PREP;HOUSEKEEPING;LAUNDRY;SHOPPING;MEDICATION MANAGEMENT;FINANCES;DRIVING;WORK;CHILD CARE

## 2017-01-28 NOTE — ADDENDUM NOTE
Addendum  created 01/28/17 1655 by Roselyn Levy MD    Modules edited: Notes Section    Notes Section:  File: [id number]; Pend: [id number]; Pend: [id number]; Pend: [id number]; Pend: [id number]

## 2017-01-28 NOTE — PLAN OF CARE
Problem: Goal Outcome Summary  Goal: Goal Outcome Summary  PT: patient in bed and just had a enema and would like to wait an hour. Advised patient would not be able to return and will reschedule for tomorrow.

## 2017-01-28 NOTE — PLAN OF CARE
Problem: Goal Outcome Summary  Goal: Goal Outcome Summary  Outcome: Improving  Filed Vitals:     01/27/17 0300 01/27/17 0806 01/27/17 0858 01/27/17 1720   BP: 106/70 120/76 96/60 110/64   Pulse:       104   Temp: 100.2  F (37.9  C) 97.3  F (36.3  C)   96  F (35.6  C)   TempSrc: Oral Axillary   Oral   Resp: 18 20   18   Height:           Weight:           SpO2: 93% 93%   95%   Early am pt has been in pain. Stated that  No change since the PCA dose has been increased. This writer called the anesthesia pain management team,  Staff came up and administered a bolus dose from the epidural infusion,  Pt stated comfort and participated with activities well Pt/OT worked with her (see notes)  Pt was anxious and agitated when she found out that she can not leave the room  due to the type of infection she has. Writer tried to explain pt was not happy reported situation to the  Nurse manager. The NM  discussed situation with the Infectious Disease  staff members. Now pt can leave the room and be out only in the 7B hallways, following the restriction instructions they provided. Pt agreed and is calm at this time.   Good urine output  Neobladder irrigated as ordered BM=NG large amount  Green tenacious. Hypo Bowl sounds, fleets enema given, pt tolerated well. No result yet.  Continue to follow up per plan of care.  Pt had some result from the fleets enema some mucus  Noted and she reported comfort.

## 2017-01-28 NOTE — PLAN OF CARE
Problem: Goal Outcome Summary  Goal: Goal Outcome Summary  Outcome: No Change  3500-7878  A&O, VSS on 5-6 L oxymask ex tachy. Pt sats were in mid-high 80's around 0800. Oxymask 6L applied and sats in mid-high 90's. Weaning off 02 as tolerated; pt is asymptomatic. C/o pain that was not controlled w/ Epidural 12 ml/hr and PCA 0.3 q 10 min. Anesthesia paged and 1x bolus given. Vitals stable post bolus and pt resting comfortably. Epidural cartridge changed this shift. Epidural site CDI. Neobladder patent with adequate output. NG to LIS with moderate amount of green tenacious output. Abdominal binder in place. Tolerating NPO diet with ice chips. IVMF's infusing. Up with SBA. Continue to monitor and follow POC.

## 2017-01-28 NOTE — PROGRESS NOTES
"Brief Pain Progress Note    Patient requesting bolus of epidural. Patient lying in bed states pain control is adequate but \"all her medications\" ran out including epidural cartridge so her pain control got a little behind but is catching up again. No symptoms of LAST.     Bolus of 10 ml of 0.125% bupivacaine through epidural at 1700    Call with concerns/questions    Roselyn Levy MD  Anesthesiology Resident  "

## 2017-01-28 NOTE — PLAN OF CARE
"Problem: Goal Outcome Summary  Goal: Goal Outcome Summary  /66 mmHg  Pulse 91  Temp(Src) 98.8  F (37.1  C) (Axillary)  Resp 16  Ht 1.651 m (5' 5\")  Wt 100.5 kg (221 lb 9 oz)  BMI 36.87 kg/m2  SpO2 92%   A&Ox4, VSS ex. tachycardia, w/ sats in low 90s on 3L O2 via NC. Epidural site C/D/I, running at 12 mL/hr. Pain managed w/ PCA dilaudid at 0.3mg q10 min. PIV infusing LR at 110 mL/hr. Neobladder patent w/ adequate output, BM overnight. NG to Low-intermittent suction w/ dark green output. Tolerating NPO w/ ice chips. Dressings dry and intact w/ scant dried drainage. Abdominal binder in place. Up w/ SBA. Continue to monitor and follow POC.          "

## 2017-01-28 NOTE — PROGRESS NOTES
"General Surgery Progress Note    Subjective: Pain adequately controlled this morning. Neobladder voiding through fuller. Passing brown mucus with flatus. No fevers, chills, shortness of breath, or vomiting. Mild nausea, but would like sips of clears.     Per RN, patient on 5-6 L oxymask for sats in mid-high 80's overnight. Patient denied dyspnea but had increased pain. Anesthesia gave 1 bolus via epidural.     Objective:   /71 mmHg  Pulse 97  Temp(Src) 96.2  F (35.7  C) (Axillary)  Resp 16  Ht 1.651 m (5' 5\")  Wt 100.5 kg (221 lb 9 oz)  BMI 36.87 kg/m2  SpO2 90%    PEx:  Gen: Awake, alert, NAD   HEENT: NG to LIS with clear bilious output   CV: RRR  Resp: non-labored at rest  Abd: Soft, non-distended. Appropriately tender at incision sites. Incisions clean, dry, intact, no erythema. Dressings with small amount dried serosanguinous drainage. No peritoneal signs.   Ext: warm and well perfused    I/O:  I/O last 3 completed shifts:  In: 1432.67 [I.V.:1312.67; Other:120]  Out: 4480 [Urine:1580; Emesis/NG output:2900]    Labs: morning labs not drawn (8:56 AM)    Imaging: none     A/P: Tammy Borges is a 56 year old lady, who is POD #3  following exploratory laparotomy with cholecystectomy, lysis of adhesions, [place] of jejunostomy, jejunocolic anastomosis, and ventral hernia repair.      - Pain management: Dilaudid PCA 0.2-0.4, Epidural 12 mL/hour   - NGT to LIS, sips of clears as tolerated, RN to track volume  - continue mIVF,  mL/hour   - PT/OT    - DVT prophylaxis: Heparin SQ TID  - Protonix 40 mg IV daily   - incentive spirometry   - Ativan IV 0.5mg q6h PRN   - fuller catheter out today, patient to straight-cath      Dispo: home pending advancements in diet & PO pain control     Seen and discussed with team     Isidro Edwards, MS3  "

## 2017-01-28 NOTE — ADDENDUM NOTE
Addendum  created 01/28/17 0330 by Jackie Vera MD    Modules edited: Clinical Notes    Clinical Notes:  File: [id number]

## 2017-01-28 NOTE — PLAN OF CARE
Problem: Goal Outcome Summary  Goal: Goal Outcome Summary  Outcome: Improving  Filed Vitals:     01/28/17 0000 01/28/17 0400 01/28/17 0746 01/28/17 1251   BP:   105/66 112/71 102/64   Pulse:   91 97 98   Temp:   98.8  F (37.1  C) 96.2  F (35.7  C) 97.5  F (36.4  C)   TempSrc:   Axillary Axillary Axillary   Resp:   16 16 16   Height:           Weight:           SpO2: 95% 92% 90% 93%   Pain well managed with the Epidural infusion and PCA Dilaudid used 3.3mg of Dilaudid this shift. Up in halls with SBA assist worked with OT (see notes) Fleets enema given as ordered no result.  Fierro discontinued at 10am no needed of str cath. NG copious amount  . Pt had some of the sprite and 3 cups of ice chips MD approved.  Abd dressing changed by MD dressing clean dry ad intact.  Continue to follow up per plan of care.    Small amount of tan loose stool and str cathed for 650 of clear urine.

## 2017-01-28 NOTE — PROGRESS NOTES
"GENERAL SURGERY PROGRESS NOTE    No acute events overnight. Tolerating ice chips. Passing flatus and liquid brown stool. No fevers, chest pain, SOB, nausea or vomiting.     /71 mmHg  Pulse 97  Temp(Src) 96.2  F (35.7  C) (Axillary)  Resp 16  Ht 1.651 m (5' 5\")  Wt 100.5 kg (221 lb 9 oz)  BMI 36.87 kg/m2  SpO2 90%    I/O last 3 completed shifts:  In: 1432.67 [I.V.:1312.67; Other:120]  Out: 4480 [Urine:1580; Emesis/NG output:2900]    NAD, AAOx3  RRR  NLB on 3L NC  Abd: soft, non distended, appropriately tender. Incisions C/D/I  Ext WWP    Labs reviewed      A/P  Tammy is a 57yo F, POD#3 from exploratory laparotomy with cholecystectomy, lysis of adhesoins, takedown of jejunostomy, jejunocolic anastamosis, and primary repair of ventral hernia. Doing well    -remove fuller and resume straight catheterization q4h per home routine  -wean off supplemental O2  -incentive spirometry  -PT/OT  -gentle enemas BID  -dilaudid PCA 0.2-0.4mg      Marie Owens MD  Family Medicine PGY1  "

## 2017-01-28 NOTE — PLAN OF CARE
Problem: Goal Outcome Summary  Goal: Goal Outcome Summary  OT/7B: Evaluation completed, treatment completed. Educated pt on abdominal precautions, handout provided. Pt completed LB dressing mod ind, ind with bed mobility. Pt up with SBA in hallways while pushing IV pole. Pt reporting no difficulty with self cares at this time. Educated pt on energy conservation and how to modify household activity for increased independence/fatigue management, pt reports daughter is able to assist with tasks. Pt does not demonstrate need for skilled OT services at this time. Will complete orders, please re-order if change in functional status. Pt will continue to receive PT services to address balance and functional mobility.    Recommendation: Home with assist as needed when medically stable and HH or OP PT to address balance/mobility deficits.

## 2017-01-28 NOTE — PROGRESS NOTES
01/28/17 1602   Quick Adds   Type of Visit Initial Occupational Therapy Evaluation   Living Environment   Lives With child(abril), dependent   Living Arrangements house   Home Accessibility bed and bath are not on the first floor;tub/shower is not walk in;stairs to enter home;stairs within home;stairs (2 railings present)   Number of Stairs to Enter Home 3   Number of Stairs Within Home 10   Stair Railings at Home inside, present at both sides;outside, present at both sides   Transportation Available car   Living Environment Comment Pt currently lives with daughter (currently 14) in house. Pt reports she has tub shower and shower chair, also has walk-in shower she uses.   Self-Care   Dominant Hand right   Usual Activity Tolerance good   Current Activity Tolerance moderate   Regular Exercise no   Equipment Currently Used at Home shower chair   Functional Level Prior   Ambulation 0-->independent   Transferring 0-->independent   Toileting 0-->independent   Bathing 0-->independent   Dressing 0-->independent   Eating 0-->independent   Communication 0-->understands/communicates without difficulty   Swallowing 0-->swallows foods/liquids without difficulty   Cognition 0 - no cognition issues reported   Fall history within last six months yes   Which of the above functional risks had a recent onset or change? ambulation;transferring   Prior Functional Level Comment Pt previously independent with all ADLs/IADLs   General Information   Onset of Illness/Injury or Date of Surgery - Date 01/25/17   Referring Physician Mohan Mtz MD   Patient/Family Goals Statement To return home   Additional Occupational Profile Info/Pertinent History of Current Problem Tammy Borges is a 56 year old lady, who is POD #3  following exploratory laparotomy with cholecystectomy, lysis of adhesions, [place] of jejunostomy, jejunocolic anastomosis, and ventral hernia repair.     Precautions/Limitations fall precautions;abdominal  precautions   Weight-Bearing Status - LUE other (see comments)  (<10 pounds)   Weight-Bearing Status - RUE other (see comments)  (<10 pounds)   Weight-Bearing Status - LLE full weight-bearing   Weight-Bearing Status - RLE full weight-bearing   General Observations Supine, agreeable to OT   General Info Comments Up with assist   Cognitive Status Examination   Orientation orientation to person, place and time   Level of Consciousness alert   Able to Follow Commands WNL/WFL   Personal Safety (Cognitive) WNL/WFL   Memory intact   Attention No deficits were identified   Organization/Problem Solving No deficits were identified   Executive Function No deficits were identified   Visual Perception   Visual Perception No deficits were identified;Wears glasses   Sensory Examination   Sensory Quick Adds No deficits were identified   Pain Assessment   Patient Currently in Pain No   Integumentary/Edema   Integumentary/Edema no deficits were identifed   Posture   Posture not impaired   Range of Motion (ROM)   ROM Quick Adds No deficits were identified   Strength   Strength Comments Pt with moderate deconditioning/weakness   Hand Strength   Hand Strength Comments WFL   Muscle Tone Assessment   Muscle Tone Quick Adds No deficits were identified   Coordination   Upper Extremity Coordination No deficits were identified   Mobility   Bed Mobility Comments IND   Balance   Balance Comments Pt reports increased balance deficits, most noted in standing   Lower Body Dressing   Level of [place]: Dress Lower Body independent   Physical Assist/Nonphysical Assist: Dress Lower Body verbal cues   Instrumental Activities of Daily Living (IADL)   Previous Responsibilities meal prep;housekeeping;laundry;shopping;medication management;finances;driving;work;   IADL Comments Pt previously independent with all IADLs. Pt reports daughter is very helpful and is able to complete majority of IADLs as needed   Activities of Daily Living  "Analysis   Impairments Contributing to Impaired Activities of Daily Living balance impaired;pain;post surgical precautions;strength decreased   General Therapy Interventions   Planned Therapy Interventions ADL retraining;IADL retraining;balance training;strengthening;transfer training;home program guidelines;progressive activity/exercise;risk factor education   Clinical Impression   Criteria for Skilled Therapeutic Interventions Met yes, treatment indicated   OT Diagnosis Post abdominal surgery impacting independence in ADLs/IADLs   Influenced by the following impairments pain, post-surgical precautions, decreased strength/activity tolerance   Assessment of Occupational Performance 1-3 Performance Deficits   Identified Performance Deficits household management, leisure, shopping   Clinical Decision Making (Complexity) Low complexity   Therapy Frequency other (see comments)  (1 session)   Predicted Duration of Therapy Intervention (days/wks) 1 treat   Anticipated Equipment Needs at Discharge (None)   Anticipated Discharge Disposition Home with Assist;Home with Outpatient Therapy   Risks and Benefits of Treatment have been explained. Yes   Patient, Family & other staff in agreement with plan of care Yes   [facility] AM-PAC  \"6 Clicks\" Daily Activity Inpatient Short Form   1. Putting on and taking off regular lower body clothing? 4 - None   2. Bathing (including washing, rinsing, drying)? 4 - None   3. Toileting, which includes using toilet, bedpan or urinal? 4 - None   4. Putting on and taking off regular upper body clothing? 4 - None   5. Taking care of personal grooming such as brushing teeth? 4 - None   6. Eating meals? 4 - None   Daily Activity Raw Score (Score out of 24.Lower scores equate to lower levels of function) 24   Total Evaluation Time   Total Evaluation Time (Minutes) 8     "

## 2017-01-28 NOTE — ADDENDUM NOTE
Addendum  created 01/28/17 1735 by Roselyn Levy MD    Modules edited: Notes Section    Notes Section:  File: [id number]

## 2017-01-28 NOTE — PROGRESS NOTES
REGIONAL ANESTHESIA PAIN SERVICE EPIDURAL NOTE  SUBJECTIVE:   Interval History: Pt reports adequate pain control via epidural.  Denies any weakness, paresthesias, circumoral numbness, metallic taste or tinnitus.  Pt is ambulating with assistance.  Patient is currently without nausea; without pruritis.  Happy she was able to have a small sprite this morning. Bolus of epidural really helped her sleep last night.        Clinically Aligned Pain Assessment (CAPA):  Comfort (How is your pain?): Comfortably manageable  Change in Pain (Since your last medication/intervention?): Getting better  Pain Control (How are your pain treatments working?):  Partially effective pain control  Functioning (Are you able to do activities to get better?) : Can do most things, but pain gets in the way of some   Sleep (Does your pain management allow you to sleep or rest?): Normal sleep    Anticoagulation: SC heparin q 8 hours 5000 units (last dose 0937)     OBJECTIVE:  Diagnostics:  WBC      4.0   1/28/2017  RBC     3.50   1/28/2017  HGB     10.1   1/28/2017  HCT     33.3   1/28/2017  PLT      218   1/28/2017    INR     1.10   1/25/2017    Vitals:    Temp:  [35.6  C (96  F)-37.1  C (98.8  F)] 36.1  C (96.9  F)  Pulse:  [] 98  Heart Rate:  [96] 96  Resp:  [16-18] 16  BP: (102-138)/(64-81) 138/78 mmHg  SpO2:  [90 %-97 %] 94 %    Exam:    Strength 5/5 and symmetric grossly in bilateral LE   Epidural site with dressing c/d/i, no tenderness, erythema, heme, edema    ASSESSMENT/PLAN:    Tammy Borges is a 56 year old female POD #3 s/p LAPAROTOMY EXPLORATORY  CHOLECYSTECTOMY  HERNIORRHAPHY VENTRAL  REVISE ILEAL LOOP CONDUIT, COMBINED CYSTOSCOPY, CYSTOGRAM, REPAIR FISTULA VESICOVAGINAL VAGINAL>   REVISE ILEAL LOOP CONDUIT  COMBINED CYSTOSCOPY, CYSTOGRAM, REPAIR FISTULA VESICOVAGINAL VAGINAL> and placement of T 8-9 epidural for analgesia.  Pt receiving adequate analgesia with epidural infusion bupivacaine 0.125% 12 mL/hr. Pt is ambulating without  difficulty.  No weakness or paresthesias, adequate sensory block.  No evidence of adverse side effects related to local anesthetic.     -continue current epidural infusion at 12 mL/hour   -as patient's pain improves with bolus will plan to give one this evening  - will continue to follow and adjust as needed  -saw patient and discussed plan with attending anesthesiologist    Roselyn Levy MD  Anesthesiology Resident CA-2  Regional Anesthesia Pain Service  1/28/2017 4:10 PM    24 hour Job Code Pager.  For in-house use only.     Dial * * [phone] and  [place]:  [phone]  [place]: [phone]  Peds:  [phone]  Then enter call-back number  May text page using [employer], but NOT [employer].

## 2017-01-28 NOTE — PROGRESS NOTES
Anesthesia Regional Note 1/27/17:    -5 cc bolus PF 0.125% Bupivacaine on 1/27/17 at 10:30 PM  -No signs/symptoms of last  -Nurse to monitor vitals every 5 minutes for 30 minutes    Jackie Vera MD  Pager [phone]

## 2017-01-29 ENCOUNTER — APPOINTMENT (OUTPATIENT)
Dept: PHYSICAL THERAPY | Facility: CLINIC | Age: 57
DRG: 329 | End: 2017-01-29
Attending: SURGERY
Payer: COMMERCIAL

## 2017-01-29 LAB
ANION GAP SERPL CALCULATED.3IONS-SCNC: 12 MMOL/L (ref 3–14)
BUN SERPL-MCNC: 19 MG/DL (ref 7–30)
CALCIUM SERPL-MCNC: 8.3 MG/DL (ref 8.5–10.1)
CHLORIDE SERPL-SCNC: 112 MMOL/L (ref 94–109)
CO2 SERPL-SCNC: 21 MMOL/L (ref 20–32)
CREAT SERPL-MCNC: 1.56 MG/DL (ref 0.52–1.04)
GFR SERPL CREATININE-BSD FRML MDRD: 35 ML/MIN/1.7M2
GLUCOSE SERPL-MCNC: 85 MG/DL (ref 70–99)
MAGNESIUM SERPL-MCNC: 1.7 MG/DL (ref 1.6–2.3)
POTASSIUM SERPL-SCNC: 3.1 MMOL/L (ref 3.4–5.3)
SODIUM SERPL-SCNC: 146 MMOL/L (ref 133–144)

## 2017-01-29 PROCEDURE — 25800025 ZZH RX 258: Performed by: STUDENT IN AN ORGANIZED HEALTH CARE EDUCATION/TRAINING PROGRAM

## 2017-01-29 PROCEDURE — 25000128 H RX IP 250 OP 636: Performed by: NURSE PRACTITIONER

## 2017-01-29 PROCEDURE — 83735 ASSAY OF MAGNESIUM: CPT | Performed by: SURGERY

## 2017-01-29 PROCEDURE — 25000125 ZZHC RX 250: Performed by: NURSE PRACTITIONER

## 2017-01-29 PROCEDURE — 25000128 H RX IP 250 OP 636: Performed by: STUDENT IN AN ORGANIZED HEALTH CARE EDUCATION/TRAINING PROGRAM

## 2017-01-29 PROCEDURE — 25000125 ZZHC RX 250: Performed by: STUDENT IN AN ORGANIZED HEALTH CARE EDUCATION/TRAINING PROGRAM

## 2017-01-29 PROCEDURE — 36415 COLL VENOUS BLD VENIPUNCTURE: CPT | Performed by: SURGERY

## 2017-01-29 PROCEDURE — 12000008 ZZH R&B INTERMEDIATE UMMC

## 2017-01-29 PROCEDURE — 40000193 ZZH STATISTIC PT WARD VISIT

## 2017-01-29 PROCEDURE — 97530 THERAPEUTIC ACTIVITIES: CPT | Mod: GP

## 2017-01-29 PROCEDURE — 40000802 ZZH SITE CHECK

## 2017-01-29 PROCEDURE — 80048 BASIC METABOLIC PNL TOTAL CA: CPT | Performed by: SURGERY

## 2017-01-29 RX ORDER — POTASSIUM CHLORIDE 29.8 MG/ML
20 INJECTION INTRAVENOUS
Status: DISPENSED | OUTPATIENT
Start: 2017-01-29 | End: 2017-01-29

## 2017-01-29 RX ORDER — MAGNESIUM SULFATE HEPTAHYDRATE 40 MG/ML
4 INJECTION, SOLUTION INTRAVENOUS ONCE
Status: COMPLETED | OUTPATIENT
Start: 2017-01-29 | End: 2017-01-29

## 2017-01-29 RX ORDER — POTASSIUM CHLORIDE 29.8 MG/ML
20 INJECTION INTRAVENOUS ONCE
Status: COMPLETED | OUTPATIENT
Start: 2017-01-29 | End: 2017-01-29

## 2017-01-29 RX ADMIN — HYDROMORPHONE HYDROCHLORIDE: 10 INJECTION, SOLUTION INTRAMUSCULAR; INTRAVENOUS; SUBCUTANEOUS at 21:43

## 2017-01-29 RX ADMIN — SODIUM CHLORIDE, POTASSIUM CHLORIDE, SODIUM LACTATE AND CALCIUM CHLORIDE: 600; 310; 30; 20 INJECTION, SOLUTION INTRAVENOUS at 21:56

## 2017-01-29 RX ADMIN — PROCHLORPERAZINE EDISYLATE 5 MG: 5 INJECTION INTRAMUSCULAR; INTRAVENOUS at 03:32

## 2017-01-29 RX ADMIN — HYDROMORPHONE HYDROCHLORIDE: 10 INJECTION, SOLUTION INTRAMUSCULAR; INTRAVENOUS; SUBCUTANEOUS at 13:50

## 2017-01-29 RX ADMIN — HEPARIN SODIUM 5000 UNITS: 5000 INJECTION, SOLUTION INTRAVENOUS; SUBCUTANEOUS at 00:16

## 2017-01-29 RX ADMIN — HEPARIN SODIUM 5000 UNITS: 5000 INJECTION, SOLUTION INTRAVENOUS; SUBCUTANEOUS at 16:02

## 2017-01-29 RX ADMIN — LEVOTHYROXINE SODIUM ANHYDROUS 125 MCG: 100 INJECTION, POWDER, LYOPHILIZED, FOR SOLUTION INTRAVENOUS at 08:22

## 2017-01-29 RX ADMIN — ONDANSETRON 4 MG: 2 INJECTION INTRAMUSCULAR; INTRAVENOUS at 02:36

## 2017-01-29 RX ADMIN — POTASSIUM CHLORIDE 20 MEQ: 29.8 INJECTION, SOLUTION INTRAVENOUS at 15:19

## 2017-01-29 RX ADMIN — PANTOPRAZOLE SODIUM 40 MG: 40 INJECTION, POWDER, FOR SOLUTION INTRAVENOUS at 08:21

## 2017-01-29 RX ADMIN — MAGNESIUM SULFATE IN WATER 4 G: 40 INJECTION, SOLUTION INTRAVENOUS at 11:20

## 2017-01-29 RX ADMIN — POTASSIUM CHLORIDE 20 MEQ: 29.8 INJECTION, SOLUTION INTRAVENOUS at 13:48

## 2017-01-29 RX ADMIN — HEPARIN SODIUM 5000 UNITS: 5000 INJECTION, SOLUTION INTRAVENOUS; SUBCUTANEOUS at 08:21

## 2017-01-29 RX ADMIN — BUPIVACAINE HYDROCHLORIDE: 7.5 INJECTION, SOLUTION EPIDURAL; RETROBULBAR at 15:21

## 2017-01-29 NOTE — PLAN OF CARE
"Problem: Goal Outcome Summary  Goal: Goal Outcome Summary  Outcome: Improving  /78 mmHg  Pulse 98  Temp(Src) 96.9  F (36.1  C) (Oral)  Resp 16  Ht 1.651 m (5' 5\")  Wt 103.783 kg (228 lb 12.8 oz)  BMI 38.07 kg/m2  SpO2 94%    Alert and oriented x4. Able to make needs known. Afebrile. VSS on 4L O2 via NC. Pt had bout of severe abdominal pain this afternoon. Incision CDi; abd binder on. Pt was under the impression her PCA and epidural had both ran out. PCA cartridge was changed immediately when alarm sounded with \"empty syringe\" and 1 ml was wasted with second RN. Bupivacaine epidural syringe was changed approximately 1830 an hour and half after bolus given by anesthesia resident for this severe pain. Epidural site intact and dry.  ml/hr in R PIV. Left single lumen PICC saline locked. Neobladder last self catheterized by patient at 2200 for 425 ml. Pt was incontinent of very large watery stool x1 and had large watery BM again on commode. Held scheduled Fleets enema. Up with assist of 1 primarily because of lines. NG to low intermittent suction; moderate to high output because she had 2 lemon-lime sodas and ice chips approved by provider. Continue to monitor and follow POC.        "

## 2017-01-29 NOTE — PLAN OF CARE
Problem: Goal Outcome Summary  Goal: Goal Outcome Summary  A&Ox4, VSS ex. tachycardia, w/ sats in low 90s on 4L O2 via NC. Up w/ SBA, steady on feet. Pain managed w/ PCA dilaudid at 0.3mg q10 min; pt woke with an episode of severe pain at 0240; managed with PCA bumps, heat packs, repositioning, distraction. PIV infusing LR at 110 mL/hr.  Epidural site C/D/I, running at 12 mL/hr. Neobladder patent w/ adequate output. Pt had watery, tan BM overnight. NG to Low-intermittent suction w/ dark green output. Tolerating NPO w/ ice chips. Dressings dry and intact w/ small dried drainage. Abdominal binder in place.  Continue to monitor and follow POC.

## 2017-01-29 NOTE — PLAN OF CARE
Problem: Goal Outcome Summary  Goal: Goal Outcome Summary  Outcome: No Change  Filed Vitals:     01/29/17 0022 01/29/17 0240 01/29/17 0739 01/29/17 1155   BP:     119/68 93/48   Pulse:           Temp:   97.1  F (36.2  C) 100.6  F (38.1  C) 99.4  F (37.4  C)   TempSrc:   Axillary Axillary Axillary   Resp:   20 20 20   Height:           Weight:           SpO2: 95% 90% 93% 92%     Pt's pain controlled with PCA dilaudid at 0.3 mg q 10 min. Epidural running at 12 cc/hr. O2 sats in 90s on 4L NC. Max temp of 100.6 axillary. MD notified. Midline incision CDI. Abdominal binder in place. NG clamped at 1030. Magnesium replaced and potassium in process of being replaced per orders. Up with assist of 1. Pt self-caths through neobladder. Having loose BMs. NPO except ice chips and 1 popsicle/shift. Pt very frustrated at beginning of shift. Explained cares and offered pt relations. Pt became more accepting of cares. Able to make needs known. Continue with plan of care.

## 2017-01-29 NOTE — PROGRESS NOTES
REGIONAL ANESTHESIA PAIN SERVICE EPIDURAL NOTE  SUBJECTIVE:   Interval History: Pt reports adequate pain control via epidural.  Denies any weakness, paresthesias, circumoral numbness, metallic taste or tinnitus.  Pt is ambulating with assistance.  Patient is currently without nausea; without pruritis.       Clinically Aligned Pain Assessment (CAPA):  Comfort (How is your pain?): Comfortably manageable  Change in Pain (Since your last medication/intervention?): Getting better  Pain Control (How are your pain treatments working?):  Partially effective pain control  Functioning (Are you able to do activities to get better?) : Can do most things, but pain gets in the way of some   Sleep (Does your pain management allow you to sleep or rest?): Awake with occasional pain     Numerical Rating Scale:    Reports pain 2/10 at rest and 5/10 with movement.          Anticoagulation:  Heparin 5000U SQ Q8H    OBJECTIVE:  Diagnostics:  WBC      4.0   1/28/2017  RBC     3.50   1/28/2017  HGB     10.1   1/28/2017  HCT     33.3   1/28/2017  PLT      218   1/28/2017    INR     1.10   1/25/2017    Vitals:    Temp:  [95.8  F (35.4  C)-100.6  F (38.1  C)] 99.4  F (37.4  C)  Heart Rate:  [] 108  Resp:  [16-20] 20  BP: ()/(48-78) 93/48 mmHg  SpO2:  [90 %-95 %] 92 %    Exam:    Strength 5/5 and symmetric grossly in bilateral LE       ASSESSMENT/PLAN:    Tammy Borges is a 56 year old female POD #4 s/p LAPAROTOMY EXPLORATORY  CHOLECYSTECTOMY  HERNIORRHAPHY VENTRAL  REVISE ILEAL LOOP CONDUIT, COMBINED CYSTOSCOPY, CYSTOGRAM, REPAIR FISTULA VESICOVAGINAL VAGINAL>   REVISE ILEAL LOOP CONDUIT  COMBINED CYSTOSCOPY, CYSTOGRAM, REPAIR FISTULA VESICOVAGINAL VAGINAL> and placement of T8-9 epidural for analgesia.  Pt receiving adequate analgesia with epidural infusion bupivacaine 0.125%.  Pt ambulating without difficulty.  No weakness or paresthesias, adequate sensory block.  No evidence of adverse side effects related to local anesthetic.      - continue current epidural infusion at 12mL/hour   - will continue to follow and adjust as needed  - discussed plan with attending anesthesiologist        Swathi Schultz MD  [facility] Anest[facility] Pain [facility]  1/29/2017 1:55 PM    24 hour Job Code Pager.  For in-house use only.     Dial * * [phone] and  [place]:  [phone]  [place]: [phone]  Peds:  [phone]  Then enter call-back number  May text page using [employer], but NOT [employer].

## 2017-01-29 NOTE — PROGRESS NOTES
Surgery Progress note    S:  Patient had several loose BMs yesterday and enemas were held. Self catheterized with no issues. NG with high output. Pt seen at bedside resting comfortably.   Denies F/C/Sweats.  Pain well controlled.      O:  Filed Vitals:    01/28/17 2326 01/29/17 0022 01/29/17 0240 01/29/17 0739   BP: 123/73   119/68   Pulse:       Temp: 95.8  F (35.4  C)  97.1  F (36.2  C) 100.6  F (38.1  C)   TempSrc: Axillary  Axillary Axillary   Resp: 18  20 20   Height:       Weight:       SpO2: 95% 95% 90% 93%       A&Ox3, NAD  Breathing non-labored  RRR  Soft, ND, appropriately ttp, incisions c/d/i  Distal extremities warm.     NGT: 3.9 L     BMP  Recent Labs  Lab 01/28/17  0906 01/27/17  0630 01/26/17  0810 01/25/17  1142  01/24/17  1154   * 143 140 138  < > 141   POTASSIUM 3.4 4.2 4.2 3.9  < > 3.4   CHLORIDE 114* 111* 109  --   --  106   LILIANA 8.6 8.4* 8.8  --   --  8.7   CO2 21 24 22  --   --  30   BUN 23 28 24  --   --  28   CR 1.49* 1.88* 2.02*  --   --  1.62*   GLC 88 114* 137* 112*  < > 88   < > = values in this interval not displayed.  CBC  Recent Labs  Lab 01/28/17  0906 01/27/17  0630 01/26/17  0810 01/25/17  2055  01/25/17  1142  01/24/17  1154   WBC 4.0 6.8 8.8  --   --   --   --  6.9   RBC 3.50* 3.70* 4.40  --   --   --   --  4.57   HGB 10.1* 10.7* 12.8  --   --  13.4  < > 13.4   HCT 33.3* 35.3 41.3  --   --   --   --  40.7   MCV 95 95 94  --   --   --   --  89   MCH 28.9 28.9 29.1  --   --   --   --  29.3   MCHC 30.3* 30.3* 31.0*  --   --   --   --  32.9   RDW 15.1* 15.3* 15.2*  --   --   --   --  14.6    196 234 200  < >  --   --  252   < > = values in this interval not displayed.  INR  Recent Labs  Lab 01/25/17  1200   INR 1.10         A/P: Tammy is a 57yo F, POD#4 from exploratory laparotomy with cholecystectomy, lysis of adhesoins, takedown of jejunostomy, jejunocolic anastamosis, and primary repair of ventral hernia. Doing well    - Replace lytes  - NG clmapping trial for 6 hours.  Put it back on suction if output <300 can pull it out.    -Continue straight catheterization q4h per home routine  -wean off supplemental O2  - incentive spirometry  - PT/OT  -Hold enemas as patient is having frequent loose stools  - Continue NGT, NPO  - dilaudid PCA 0.2-0.4mg and epidural for pain  - heparin SC TID, ambulate     Dispo: pending ROBF and adequate pain control     Patient seen and discussed with staff and chief resident.     Pavel Kelsey MD  PGY-1, General Surgery  [phone]

## 2017-01-29 NOTE — PLAN OF CARE
Problem: Goal Outcome Summary  Goal: Goal Outcome Summary  PT: patient completely independent with bed mobility and transfers from bed to chair. Patient very motivated to work with therapy but did state very upset today. She wanted to walk out into hallway. Checked in with nursing to confirm this was ok. Nursing stated patient must remain in room. Advised patient and she became very agitated and upset. Nursing arrived in room and after explanation of reasons to requiring patient to stay in room patient displayed increasing agitation. Charge nurse arrived in room. Stopped therapy secondary to increased agitation. Will reschedule for tomorrow. Recommend d/c to home with OP PT per previous PT session per plan of care established by physical therapist.

## 2017-01-30 ENCOUNTER — APPOINTMENT (OUTPATIENT)
Dept: PHYSICAL THERAPY | Facility: CLINIC | Age: 57
DRG: 329 | End: 2017-01-30
Attending: SURGERY
Payer: COMMERCIAL

## 2017-01-30 LAB
BACTERIA SPEC CULT: NO GROWTH
COPATH REPORT: NORMAL
MAGNESIUM SERPL-MCNC: 1.8 MG/DL (ref 1.6–2.3)
MICRO REPORT STATUS: NORMAL
POTASSIUM SERPL-SCNC: 3 MMOL/L (ref 3.4–5.3)
SPECIMEN SOURCE: NORMAL

## 2017-01-30 PROCEDURE — 12000008 ZZH R&B INTERMEDIATE UMMC

## 2017-01-30 PROCEDURE — 25800025 ZZH RX 258: Performed by: STUDENT IN AN ORGANIZED HEALTH CARE EDUCATION/TRAINING PROGRAM

## 2017-01-30 PROCEDURE — 40000193 ZZH STATISTIC PT WARD VISIT: Performed by: PHYSICAL THERAPIST

## 2017-01-30 PROCEDURE — 25000125 ZZHC RX 250: Performed by: STUDENT IN AN ORGANIZED HEALTH CARE EDUCATION/TRAINING PROGRAM

## 2017-01-30 PROCEDURE — 83735 ASSAY OF MAGNESIUM: CPT | Performed by: SURGERY

## 2017-01-30 PROCEDURE — 97116 GAIT TRAINING THERAPY: CPT | Mod: GP | Performed by: PHYSICAL THERAPIST

## 2017-01-30 PROCEDURE — 97530 THERAPEUTIC ACTIVITIES: CPT | Mod: GP | Performed by: PHYSICAL THERAPIST

## 2017-01-30 PROCEDURE — 36415 COLL VENOUS BLD VENIPUNCTURE: CPT | Performed by: SURGERY

## 2017-01-30 PROCEDURE — 84132 ASSAY OF SERUM POTASSIUM: CPT | Performed by: SURGERY

## 2017-01-30 PROCEDURE — 25000128 H RX IP 250 OP 636: Performed by: SURGERY

## 2017-01-30 PROCEDURE — 40000802 ZZH SITE CHECK

## 2017-01-30 RX ORDER — POTASSIUM CHLORIDE 7.45 MG/ML
10 INJECTION INTRAVENOUS
Status: DISCONTINUED | OUTPATIENT
Start: 2017-01-30 | End: 2017-02-10 | Stop reason: HOSPADM

## 2017-01-30 RX ORDER — POTASSIUM CHLORIDE 29.8 MG/ML
20 INJECTION INTRAVENOUS
Status: DISCONTINUED | OUTPATIENT
Start: 2017-01-30 | End: 2017-02-10 | Stop reason: HOSPADM

## 2017-01-30 RX ORDER — MAGNESIUM SULFATE HEPTAHYDRATE 40 MG/ML
4 INJECTION, SOLUTION INTRAVENOUS EVERY 4 HOURS PRN
Status: DISCONTINUED | OUTPATIENT
Start: 2017-01-30 | End: 2017-02-08

## 2017-01-30 RX ORDER — POTASSIUM CHLORIDE 750 MG/1
20-40 TABLET, EXTENDED RELEASE ORAL
Status: DISCONTINUED | OUTPATIENT
Start: 2017-01-30 | End: 2017-02-10 | Stop reason: HOSPADM

## 2017-01-30 RX ORDER — POTASSIUM CHLORIDE 1.5 G/1.58G
20-40 POWDER, FOR SOLUTION ORAL
Status: DISCONTINUED | OUTPATIENT
Start: 2017-01-30 | End: 2017-02-10 | Stop reason: HOSPADM

## 2017-01-30 RX ORDER — BUPIVACAINE HYDROCHLORIDE 2.5 MG/ML
2.5 INJECTION, SOLUTION EPIDURAL; INFILTRATION; INTRACAUDAL ONCE
Status: DISCONTINUED | OUTPATIENT
Start: 2017-01-30 | End: 2017-02-10 | Stop reason: HOSPADM

## 2017-01-30 RX ADMIN — HYDROMORPHONE HYDROCHLORIDE: 10 INJECTION, SOLUTION INTRAMUSCULAR; INTRAVENOUS; SUBCUTANEOUS at 13:59

## 2017-01-30 RX ADMIN — HEPARIN SODIUM 5000 UNITS: 5000 INJECTION, SOLUTION INTRAVENOUS; SUBCUTANEOUS at 10:07

## 2017-01-30 RX ADMIN — HEPARIN SODIUM 5000 UNITS: 5000 INJECTION, SOLUTION INTRAVENOUS; SUBCUTANEOUS at 01:32

## 2017-01-30 RX ADMIN — HEPARIN SODIUM 5000 UNITS: 5000 INJECTION, SOLUTION INTRAVENOUS; SUBCUTANEOUS at 17:48

## 2017-01-30 RX ADMIN — POTASSIUM CHLORIDE 10 MEQ: 7.46 INJECTION, SOLUTION INTRAVENOUS at 19:05

## 2017-01-30 RX ADMIN — LEVOTHYROXINE SODIUM ANHYDROUS 125 MCG: 100 INJECTION, POWDER, LYOPHILIZED, FOR SOLUTION INTRAVENOUS at 08:18

## 2017-01-30 RX ADMIN — SODIUM CHLORIDE, POTASSIUM CHLORIDE, SODIUM LACTATE AND CALCIUM CHLORIDE: 600; 310; 30; 20 INJECTION, SOLUTION INTRAVENOUS at 17:22

## 2017-01-30 RX ADMIN — PANTOPRAZOLE SODIUM 40 MG: 40 INJECTION, POWDER, FOR SOLUTION INTRAVENOUS at 08:17

## 2017-01-30 RX ADMIN — POTASSIUM CHLORIDE 10 MEQ: 7.46 INJECTION, SOLUTION INTRAVENOUS at 22:54

## 2017-01-30 RX ADMIN — POTASSIUM CHLORIDE 10 MEQ: 7.46 INJECTION, SOLUTION INTRAVENOUS at 17:48

## 2017-01-30 RX ADMIN — POTASSIUM CHLORIDE 10 MEQ: 7.46 INJECTION, SOLUTION INTRAVENOUS at 20:23

## 2017-01-30 NOTE — ANESTHESIA POST-OP FOLLOW-UP NOTE
REGIONAL ANESTHESIA PAIN SERVICE EPIDURAL NOTE  SUBJECTIVE:    Interval History: Pt reports good pain control via epidural.  Denies any weakness, paresthesias, circumoral numbness, metallic taste or tinnitus.  Pt ambulating with assistance.  Patient is currently without nausea.  NG removed earlier today.  Tolerating sips of water and one popsicle Q shift without nausea.                     Clinically Aligned Pain Assessment (CAPA):  Comfort (How is your pain?): Comfortably manageable  Change in Pain (Since your last medication/intervention?): Getting better  Pain Control (How are your pain treatments working?):  Partially effective pain control  Functioning (Are you able to do activities to get better?) : Can do most things, but pain gets in the way of some    Sleep (Does your pain management allow you to sleep or rest?): Normal sleep        Anticoagulation:      heparin sodium PF injection 5,000 Units  5,000 Units, SC, Q8H  Given: 01/30 0132            OBJECTIVE:  Diagnostics:  WBC      4.0   1/28/2017  RBC     3.50   1/28/2017  HGB     10.1   1/28/2017  HCT     33.3   1/28/2017  PLT      218   1/28/2017    INR     1.10   1/25/2017    Vitals:              Temp:  [96.1  F (35.6  C)-100.5  F (38.1  C)] 98.3  F (36.8  C)  Heart Rate:  [] 102  Resp:  [16-20] 16  BP: ()/(48-82) 145/82 mmHg  SpO2:  [92 %-95 %] 94 %    Exam:                Strength 5/5 and symmetric grossly in bilateral LE              Epidural catheter removed at 0820 without complication, Insertion site c/d/i, no tenderness, erythema, heme, edema.  bandaid applied      ASSESSMENT/PLAN:     Tammy Borges is a 56 year old female POD #5 s/p LAPAROTOMY EXPLORATORY CHOLECYSTECTOMY HERNIORRHAPHY VENTRAL REVISE ILEAL LOOP CONDUIT, COMBINED CYSTOSCOPY, CYSTOGRAM, REPAIR FISTULA VESICOVAGINAL VAGINAL>  REVISE ILEAL LOOP CONDUIT COMBINED CYSTOSCOPY, CYSTOGRAM, REPAIR FISTULA VESICOVAGINAL VAGINAL> and placement of T8-9 epidural for analgesia.  Pt  receiving adequate analgesia with epidural infusion Bupivacaine 0.125% at 12mL/hour and using PCA Dilaudid 0.3 mg Q 10min PRN.  Pt ambulating without difficulty.  No weakness or paresthesias, adequate sensory block.  No evidence of adverse side effects related to local anesthetic.    - epidural bolused with PF bupivacaine 0.125% 5 mL at 0815 and catheter removed at 0820 without complication, dark tip intact  - BP and P Q 5 min x 30 min, call if MAP less than 60, bedside nurse aware  - okay to resume Heparin SC at 0930, bedside nurse aware  - will sign off        Cullen Mclaughlin MD  January 30, 2017

## 2017-01-30 NOTE — PROGRESS NOTES
"General Surgery Progress Note    Subjective: No acute issues overnight. Pain adequately controlled. Tolerating NG clamp trial since 2200 yesterday. Passing flatus, several liquid & mucus BM. Denies fevers, chills, dyspnea, nausea, or vomiting.     Objective:   /70 mmHg  Pulse 98  Temp(Src) 98.3  F (36.8  C) (Oral)  Resp 16  Ht 1.651 m (5' 5\")  Wt 104.463 kg (230 lb 4.8 oz)  BMI 38.32 kg/m2  SpO2 94%    PEx:  Gen: Awake, alert, NAD   CV: Normal rate, regular rhythm  Resp: non-labored at rest, CTAB  Abd: Soft, non-distended, appropriately tender at incisions. Incisions clean, dry, intact, no erythema. Dressings with small amount of dried blood. No peritoneal signs.   Ext: warm and well perfused    I/O:  I/O last 3 completed shifts:  In: 981.83 [P.O.:100; I.V.:881.83]  Out: 3675 [Urine:1525; Emesis/NG output:1100; Other:300; Stool:750]    Labs: Reviewed    Imaging: Reviewed    A/P: Tammy Borges is a 56 year old lady, who is POD #4  following exploratory laparotomy with cholecystectomy, lysis of adhesions, [place] of jejunostomy, jejunocolic anastomosis, and ventral hernia repair.      - pain control: Dilaudid PCA 0.2-0.4, anesthesia stopped epidural    - NGT discontinued, advance diet to clears as tolerated   - PT/OT  - DVT prophylaxis: Heparin SQ TID   - Protonix 40 mg IV daily   - wean oxygen requirements, incentive spirometry  - Ativan IV 0.5 mg q6h PRN  - straight intermittent catheterization of neobladder by patient     Dispo: home pending advancements in diet & PO pain control     Seen and discussed with staff     Isidro Edwards, MS3  "

## 2017-01-30 NOTE — PROGRESS NOTES
"General Surgery Progress Note    Subjective  No acute events. Tolerating NG clamping,+flatus, several BMs.    Objective  /78 mmHg  Pulse 98  Temp(Src) 99.4  F (37.4  C) (Oral)  Resp 16  Ht 1.651 m (5' 5\")  Wt 104.463 kg (230 lb 4.8 oz)  BMI 38.32 kg/m2  SpO2 92%    On Exam  NAD, Awake, oriented  NLB  Abdomen soft, nondistended, appropiately tender.. Incision c/d/i    I/O last 3 completed shifts:  In: 880 [I.V.:880]  Out: 2375 [Urine:1225; Stool:1150]    WBC      4.0   1/28/2017  RBC     3.50   1/28/2017  HGB     10.1   1/28/2017  HCT     33.3   1/28/2017  No components found with this name: mct  MCV       95   1/28/2017  MCH     28.9   1/28/2017  MCHC     30.3   1/28/2017  RDW     15.1   1/28/2017  PLT      218   1/28/2017  basic metabolic panel    Assessment/Plan:  Tammy navarro is a 55 yo lady s/p xlap, cholecystectomy, REMA, jejunostomy takedown with jejunocolic anastomosis, and ventral hernia repair on 1/26    - Continue PCA  - Clear liquids today  - intermittent straight cath  - PT/OT  -Lovenox  - dispo pending ROBF, tolerance of regular diet    Patient discussed with staff, Dr. Myriam Leung   Surgery PGY2  [phone]    "

## 2017-01-30 NOTE — PLAN OF CARE
"Problem: Goal Outcome Summary  Goal: Goal Outcome Summary  /72 mmHg  Pulse 98  Temp(Src) 100.5  F (38.1  C) (Oral)  Resp 16  Ht 1.651 m (5' 5\")  Wt 104.463 kg (230 lb 4.8 oz)  BMI 38.32 kg/m2  SpO2 92%   A&Ox4, VSS ex. tachycardia, w/ sats in low 90s on 4L O2 via NC. Up w/ SBA, steady on feet. Pain managed w/ PCA dilaudid at 0.3mg q10 min; controlled well. PICC infusing LR at 110 mL/hr.  Epidural site C/D/I, running at 12 mL/hr. Neobladder patent w/ good output. Pt had watery, tan BM x 2 overnight. NG clamped since 2300; tolerating well; NPO w/ ice chips, denies nausea. Dressings dry and intact w/ small dried drainage. Abdominal binder in place.  Continue to monitor and follow POC.       "

## 2017-01-30 NOTE — ADDENDUM NOTE
Addendum  created 01/30/17 1453 by Cullen Mclaughlin MD    Modules edited: Anesthesia Events, Narrator, Notes Section    Narrator:  Narrator: Event Log Edited    Notes Section:  File: [id number]

## 2017-01-30 NOTE — PLAN OF CARE
"Problem: Goal Outcome Summary  Goal: Goal Outcome Summary  Outcome: Improving  /72 mmHg  Pulse 98  Temp(Src) 100.5  F (38.1  C) (Oral)  Resp 16  Ht 1.651 m (5' 5\")  Wt 104.463 kg (230 lb 4.8 oz)  BMI 38.32 kg/m2  SpO2 92%  Pt has been avss, with the exception of temp of 100.5. She zach signs and symptoms of local anesthetic system toxicity as stated in the active orders. Pt reported good pain control with the current medications. The NG is clamped and the residual at 1600 was 250 ml and 30 ml at 2100, pt preferred to leave the tube in over night. She is negative for nausea. Will continue to monitor.        "

## 2017-01-30 NOTE — PROGRESS NOTES
REGIONAL ANESTHESIA PAIN SERVICE EPIDURAL NOTE  SUBJECTIVE:   Interval History: Pt reports good pain control via epidural.  Denies any weakness, paresthesias, circumoral numbness, metallic taste or tinnitus.  Pt ambulating with assistance.  Patient is currently without nausea.  NG removed earlier today.  Tolerating sips of water and one popsicle Q shift without nausea.        Clinically Aligned Pain Assessment (CAPA):  Comfort (How is your pain?): Comfortably manageable  Change in Pain (Since your last medication/intervention?): Getting better  Pain Control (How are your pain treatments working?):  Partially effective pain control  Functioning (Are you able to do activities to get better?) : Can do most things, but pain gets in the way of some   Sleep (Does your pain management allow you to sleep or rest?): Normal sleep        Anticoagulation:     heparin sodium PF injection 5,000 Units 5,000 Units, SC, Q8H Given: 01/30 0132          OBJECTIVE:  Diagnostics:  WBC      4.0   1/28/2017  RBC     3.50   1/28/2017  HGB     10.1   1/28/2017  HCT     33.3   1/28/2017  PLT      218   1/28/2017    INR     1.10   1/25/2017    Vitals:    Temp:  [96.1  F (35.6  C)-100.5  F (38.1  C)] 98.3  F (36.8  C)  Heart Rate:  [] 102  Resp:  [16-20] 16  BP: ()/(48-82) 145/82 mmHg  SpO2:  [92 %-95 %] 94 %    Exam:    Strength 5/5 and symmetric grossly in bilateral LE   Epidural catheter removed at 0820 without complication, Insertion site c/d/i, no tenderness, erythema, heme, edema.  bandaid applied      ASSESSMENT/PLAN:    Tammy Borges is a 56 year old female POD #5 s/p LAPAROTOMY EXPLORATORY CHOLECYSTECTOMY HERNIORRHAPHY VENTRAL REVISE ILEAL LOOP CONDUIT, COMBINED CYSTOSCOPY, CYSTOGRAM, REPAIR FISTULA VESICOVAGINAL VAGINAL>  REVISE ILEAL LOOP CONDUIT COMBINED CYSTOSCOPY, CYSTOGRAM, REPAIR FISTULA VESICOVAGINAL VAGINAL> and placement of T8-9 epidural for analgesia.  Pt receiving adequate analgesia with epidural infusion  Bupivacaine 0.125% at 12mL/hour and using PCA Dilaudid 0.3 mg Q 10min PRN.  Pt ambulating without difficulty.  No weakness or paresthesias, adequate sensory block.  No evidence of adverse side effects related to local anesthetic.    - epidural bolused with PF bupivacaine 0.125% 5 mL at 0815 and catheter removed at 0820 without complication, dark tip intact  - BP and P Q 5 min x 30 min, call if MAP less than 60, bedside nurse aware  - okay to resume Heparin SC at 0930, bedside nurse aware  - will sign off  - discussed plan with attending anesthesiologist        KAREEM Garcia [facility]  Regional Anesthesia Pain Service  1/30/2017 9:19 AM    24 hour Job Code Pager.  For in-house use only.     Dial * * [phone] and  [place]:  -[phone]  [place]: [phone]  Peds:  [phone]  Then enter call-back number  May text page using [employer], but NOT [employer].

## 2017-01-30 NOTE — PLAN OF CARE
Problem: Goal Outcome Summary  Goal: Goal Outcome Summary  Outcome: Improving  B/P: 122/70, T: 98.3, P: 98, R: 16  Patient states abdominal pain is tolerated with PCA Dilaudid 0.3 every 10 min.  Epidural removed this morning with no problems.  Up with SBA.  Abdominal dressing intact, changed x 1.  Abdomen soft with bowel sounds present, patient had loose BM x 2 today.  Neobladder stoma pink with a small amount of dried blood and mucous noted.  Writer gave patient sterile gauze and irrigation to wash off stoma. Patient can have one popsicle a shift, and paged provider to clarify order because patient is stating she can have more than one as well as a picture of water/ice.    MIVF infusing through left PICC.  Patient voiding and using straight cath on her own.  Continue with POC.

## 2017-01-31 ENCOUNTER — APPOINTMENT (OUTPATIENT)
Dept: PHYSICAL THERAPY | Facility: CLINIC | Age: 57
DRG: 329 | End: 2017-01-31
Attending: SURGERY
Payer: COMMERCIAL

## 2017-01-31 LAB
C DIFF TOX B STL QL: NORMAL
ERYTHROCYTE [DISTWIDTH] IN BLOOD BY AUTOMATED COUNT: 15 % (ref 10–15)
HCT VFR BLD AUTO: 31.1 % (ref 35–47)
HGB BLD-MCNC: 9.6 G/DL (ref 11.7–15.7)
LACTATE BLD-SCNC: 0.7 MMOL/L (ref 0.7–2.1)
MCH RBC QN AUTO: 28.5 PG (ref 26.5–33)
MCHC RBC AUTO-ENTMCNC: 30.9 G/DL (ref 31.5–36.5)
MCV RBC AUTO: 92 FL (ref 78–100)
PLATELET # BLD AUTO: 259 10E9/L (ref 150–450)
POTASSIUM SERPL-SCNC: 3.1 MMOL/L (ref 3.4–5.3)
POTASSIUM SERPL-SCNC: 3.2 MMOL/L (ref 3.4–5.3)
POTASSIUM SERPL-SCNC: 3.2 MMOL/L (ref 3.4–5.3)
RBC # BLD AUTO: 3.37 10E12/L (ref 3.8–5.2)
SPECIMEN SOURCE: NORMAL
WBC # BLD AUTO: 15.9 10E9/L (ref 4–11)

## 2017-01-31 PROCEDURE — 12000008 ZZH R&B INTERMEDIATE UMMC

## 2017-01-31 PROCEDURE — 87493 C DIFF AMPLIFIED PROBE: CPT | Performed by: STUDENT IN AN ORGANIZED HEALTH CARE EDUCATION/TRAINING PROGRAM

## 2017-01-31 PROCEDURE — 85027 COMPLETE CBC AUTOMATED: CPT | Performed by: STUDENT IN AN ORGANIZED HEALTH CARE EDUCATION/TRAINING PROGRAM

## 2017-01-31 PROCEDURE — 40000193 ZZH STATISTIC PT WARD VISIT: Performed by: REHABILITATION PRACTITIONER

## 2017-01-31 PROCEDURE — 85049 AUTOMATED PLATELET COUNT: CPT | Performed by: STUDENT IN AN ORGANIZED HEALTH CARE EDUCATION/TRAINING PROGRAM

## 2017-01-31 PROCEDURE — 36415 COLL VENOUS BLD VENIPUNCTURE: CPT | Performed by: STUDENT IN AN ORGANIZED HEALTH CARE EDUCATION/TRAINING PROGRAM

## 2017-01-31 PROCEDURE — 25000128 H RX IP 250 OP 636: Performed by: STUDENT IN AN ORGANIZED HEALTH CARE EDUCATION/TRAINING PROGRAM

## 2017-01-31 PROCEDURE — 40000802 ZZH SITE CHECK

## 2017-01-31 PROCEDURE — 36415 COLL VENOUS BLD VENIPUNCTURE: CPT | Performed by: SURGERY

## 2017-01-31 PROCEDURE — 97116 GAIT TRAINING THERAPY: CPT | Mod: GP | Performed by: REHABILITATION PRACTITIONER

## 2017-01-31 PROCEDURE — 97530 THERAPEUTIC ACTIVITIES: CPT | Mod: GP | Performed by: REHABILITATION PRACTITIONER

## 2017-01-31 PROCEDURE — 84132 ASSAY OF SERUM POTASSIUM: CPT | Performed by: SURGERY

## 2017-01-31 PROCEDURE — 25000125 ZZHC RX 250: Performed by: SURGERY

## 2017-01-31 PROCEDURE — 83605 ASSAY OF LACTIC ACID: CPT | Performed by: SURGERY

## 2017-01-31 PROCEDURE — 25000128 H RX IP 250 OP 636: Performed by: SURGERY

## 2017-01-31 PROCEDURE — 97110 THERAPEUTIC EXERCISES: CPT | Mod: GP | Performed by: REHABILITATION PRACTITIONER

## 2017-01-31 PROCEDURE — 25000132 ZZH RX MED GY IP 250 OP 250 PS 637: Performed by: SURGERY

## 2017-01-31 PROCEDURE — 25000125 ZZHC RX 250: Performed by: STUDENT IN AN ORGANIZED HEALTH CARE EDUCATION/TRAINING PROGRAM

## 2017-01-31 RX ADMIN — POTASSIUM CHLORIDE 10 MEQ: 7.46 INJECTION, SOLUTION INTRAVENOUS at 05:42

## 2017-01-31 RX ADMIN — POTASSIUM CHLORIDE 20 MEQ: 29.8 INJECTION, SOLUTION INTRAVENOUS at 08:43

## 2017-01-31 RX ADMIN — HYDROMORPHONE HYDROCHLORIDE: 10 INJECTION, SOLUTION INTRAMUSCULAR; INTRAVENOUS; SUBCUTANEOUS at 08:35

## 2017-01-31 RX ADMIN — POTASSIUM CHLORIDE 10 MEQ: 7.46 INJECTION, SOLUTION INTRAVENOUS at 23:53

## 2017-01-31 RX ADMIN — HEPARIN SODIUM 5000 UNITS: 5000 INJECTION, SOLUTION INTRAVENOUS; SUBCUTANEOUS at 18:00

## 2017-01-31 RX ADMIN — PANTOPRAZOLE SODIUM 40 MG: 40 INJECTION, POWDER, FOR SOLUTION INTRAVENOUS at 08:47

## 2017-01-31 RX ADMIN — HEPARIN SODIUM 5000 UNITS: 5000 INJECTION, SOLUTION INTRAVENOUS; SUBCUTANEOUS at 02:20

## 2017-01-31 RX ADMIN — POTASSIUM CHLORIDE 10 MEQ: 7.46 INJECTION, SOLUTION INTRAVENOUS at 22:23

## 2017-01-31 RX ADMIN — POTASSIUM CHLORIDE 40 MEQ: 750 TABLET, EXTENDED RELEASE ORAL at 13:58

## 2017-01-31 RX ADMIN — ONDANSETRON 4 MG: 2 INJECTION INTRAMUSCULAR; INTRAVENOUS at 19:43

## 2017-01-31 RX ADMIN — LEVOTHYROXINE SODIUM ANHYDROUS 125 MCG: 100 INJECTION, POWDER, LYOPHILIZED, FOR SOLUTION INTRAVENOUS at 08:47

## 2017-01-31 RX ADMIN — POTASSIUM CHLORIDE 10 MEQ: 7.46 INJECTION, SOLUTION INTRAVENOUS at 06:48

## 2017-01-31 RX ADMIN — HEPARIN SODIUM 5000 UNITS: 5000 INJECTION, SOLUTION INTRAVENOUS; SUBCUTANEOUS at 10:57

## 2017-01-31 RX ADMIN — POTASSIUM CHLORIDE 20 MEQ: 750 TABLET, EXTENDED RELEASE ORAL at 16:52

## 2017-01-31 NOTE — PLAN OF CARE
Problem: Goal Outcome Summary  Goal: Goal Outcome Summary  Outcome: Improving  Filed Vitals:     01/31/17 0030 01/31/17 0054 01/31/17 0811 01/31/17 0850   BP:   119/74 115/73 112/69   Pulse:           Temp:   99.6  F (37.6  C) 97.2  F (36.2  C) 96  F (35.6  C)   TempSrc:   Oral Oral Axillary   Resp:   16 16 20   Height:           Weight:           SpO2: 96% 95% 98% 97%   Pt has been doing well. Up in halls once did well. Worked with PT (See notes)    Pain well managed with Dilaudid PCA at 0.4mg every 10min.   Had several loose green watery stools, sample sent  To r/o C Diff.    Str cath couple times yellow cloudy urine and irrigated with 120cc of NS.   Tolerating clears well. K replacement done and rechecked at 1pm 3.1   Replacement started per protocol. Continue to follow up per plan of care.

## 2017-01-31 NOTE — PLAN OF CARE
"Problem: Goal Outcome Summary  Goal: Goal Outcome Summary  Outcome: Improving  /70 mmHg  Pulse 98  Temp(Src) 98.5  F (36.9  C) (Oral)  Resp 16  Ht 1.651 m (5' 5\")  Wt 112.356 kg (247 lb 11.2 oz)  BMI 41.22 kg/m2  SpO2 91%  Pt afebrile, vitals stable, pain well controlled with PCA dilaudid. Pt tolerating sips of clears and popsicle. Pt does intermittent catheterization every 4 hrs. Abdominal incision with staples- oozing intermittently, binder in place. Pt endorses flatus and having stools. Cont with plan of care.          "

## 2017-01-31 NOTE — PLAN OF CARE
"Problem: Individualization  Goal: Patient Preferences  Outcome: No Change  ./74 mmHg  Pulse 98  Temp(Src) 99.6  F (37.6  C) (Oral)  Resp 16  Ht 1.651 m (5' 5\")  Wt 112.356 kg (247 lb 11.2 oz)  BMI 41.22 kg/m2  SpO2 95%     Patient C/O more pain -did increase PCA to 0.4 and still reports not having good pain control; midline incision covered; MIV's infusing via left PICC; K recheck was 3.2 and replacement in progress; patient is self cathing her neobladder with minimal assist; up to the bathroom independently having BM's; taking in ice chips & sips and has now been advanced to clears; appeared to sleep in between cares        "

## 2017-01-31 NOTE — PROGRESS NOTES
Care Coordinator Progress Note     Admission Date/Time:  1/25/2017  Attending MD:  Alban Coats MD     Data  Chart reviewed, discussed with interdisciplinary team.   Patient was admitted for: Ventral hernia without obstruction or gangrene.    Concerns with insurance coverage for discharge needs: None.  Current Living Situation: Patient lives with 14 year old daughter.  Support System: Supportive and Involved  Services Involved: Home Infusion  Transportation: Air transport and Family or Friend will provide  Barriers to Discharge: medical clearance    Coordination of Care and Referrals: Provided patient/family with options for Outpatient Rehab.        Assessment  Patient is a 56 year old female s/p cholecystectomy, REMA, jejunostomy takedown, and ventral hernia repair.  PT working with patient and recommending outpatient PT.  Met with patient at bedside to introduce RNCC role and discuss discharge planning.  Patient is from [place], [place] where she lives with her daughter.  Prior to surgery patient had been doing IV fluid and magnesium infusions at home independently and going to clinic weekly for PICC dressing cares.  Per MD team patient should be on regular diet at discharge and should no longer need IV fluids or magnesium replacement at home.  Patients PCP is at the [facility](P: [phone], F: [phone]) and she would like to go there for outpatient physical therapy.  Orders faxed to the clinic.  Will fax discharge papers to the clinic at time of discharge.  Patient flew here from [facility] and plans to fly home after discharged from the hospital. Will continue to follow and assist with discharge planning as needed.        Plan  Anticipated Discharge Date:  TBD  Anticipated Discharge Plan:  Home with OP PT    Thais Martinez, MABEL  [phone]

## 2017-01-31 NOTE — PROGRESS NOTES
"General Surgery Progress Note    Subjective  No acute events overnight, passing flatus and BMs    Objective  /69 mmHg  Pulse 98  Temp(Src) 96  F (35.6  C) (Axillary)  Resp 20  Ht 1.651 m (5' 5\")  Wt 112.356 kg (247 lb 11.2 oz)  BMI 41.22 kg/m2  SpO2 97%    On Exam  NAD, Awake, oriented  NLB on 2L NC  Abdomen soft, nondistended, appropiately tender.. Incision c/d/i    I/O last 3 completed shifts:  In: 2870 [P.O.:180; I.V.:2690]  Out: 1650 [Urine:1250; Stool:400]    Labs reviewed    Assessment/Plan:  Tammy navarro is a 57 yo lady s/p xlap, cholecystectomy, REMA, jejunostomy takedown with jejunocolic anastomosis, and ventral hernia repair on 1/26    WBC trending up from 4.0 on 1/28 to 15.9 on 1/31. Afebrile, unknown source of infection for cause of leukocytosis.     - Continue PCA  - Clear liquids today, decrease IVF  - intermittent straight cath  - PT/OT  -Lovenox    Disposition: pending return of bowel function, tolerance of regular diet, and PO pain control    Patient discussed with staff, Dr. Myriam Owens MD  Family Medicine PGY1    "

## 2017-01-31 NOTE — PLAN OF CARE
Problem: Goal Outcome Summary  Goal: Goal Outcome Summary  Outcome: Therapy, progress toward functional goals as expected  PT-7B- Recommend home at discharge, pt would benefit from Home vs. OP PT services to address strength and balance deficits. Pt amb ~ 175 feet x 2 with single UE on IV pole and SBA. Pt participated in standing LE Therex x 10 reps. Pt performs basic transfers with SBA.

## 2017-02-01 ENCOUNTER — APPOINTMENT (OUTPATIENT)
Dept: PHYSICAL THERAPY | Facility: CLINIC | Age: 57
DRG: 329 | End: 2017-02-01
Attending: SURGERY
Payer: COMMERCIAL

## 2017-02-01 LAB
BACTERIA SPEC CULT: NORMAL
ERYTHROCYTE [DISTWIDTH] IN BLOOD BY AUTOMATED COUNT: 15 % (ref 10–15)
HCT VFR BLD AUTO: 30.6 % (ref 35–47)
HGB BLD-MCNC: 9.7 G/DL (ref 11.7–15.7)
Lab: NORMAL
MAGNESIUM SERPL-MCNC: 1.8 MG/DL (ref 1.6–2.3)
MCH RBC QN AUTO: 29 PG (ref 26.5–33)
MCHC RBC AUTO-ENTMCNC: 31.7 G/DL (ref 31.5–36.5)
MCV RBC AUTO: 92 FL (ref 78–100)
MICRO REPORT STATUS: NORMAL
PLATELET # BLD AUTO: 285 10E9/L (ref 150–450)
POTASSIUM SERPL-SCNC: 3.6 MMOL/L (ref 3.4–5.3)
RBC # BLD AUTO: 3.34 10E12/L (ref 3.8–5.2)
SPECIMEN SOURCE: NORMAL
WBC # BLD AUTO: 16 10E9/L (ref 4–11)

## 2017-02-01 PROCEDURE — 40000193 ZZH STATISTIC PT WARD VISIT

## 2017-02-01 PROCEDURE — 25000128 H RX IP 250 OP 636: Performed by: SURGERY

## 2017-02-01 PROCEDURE — 97110 THERAPEUTIC EXERCISES: CPT | Mod: GP

## 2017-02-01 PROCEDURE — 25000132 ZZH RX MED GY IP 250 OP 250 PS 637: Performed by: SURGERY

## 2017-02-01 PROCEDURE — 12000008 ZZH R&B INTERMEDIATE UMMC

## 2017-02-01 PROCEDURE — 85027 COMPLETE CBC AUTOMATED: CPT | Performed by: SURGERY

## 2017-02-01 PROCEDURE — 25000125 ZZHC RX 250: Performed by: STUDENT IN AN ORGANIZED HEALTH CARE EDUCATION/TRAINING PROGRAM

## 2017-02-01 PROCEDURE — 40000802 ZZH SITE CHECK

## 2017-02-01 PROCEDURE — 36415 COLL VENOUS BLD VENIPUNCTURE: CPT | Performed by: SURGERY

## 2017-02-01 PROCEDURE — 25000128 H RX IP 250 OP 636: Performed by: STUDENT IN AN ORGANIZED HEALTH CARE EDUCATION/TRAINING PROGRAM

## 2017-02-01 PROCEDURE — 83735 ASSAY OF MAGNESIUM: CPT | Performed by: SURGERY

## 2017-02-01 PROCEDURE — 25000132 ZZH RX MED GY IP 250 OP 250 PS 637: Performed by: STUDENT IN AN ORGANIZED HEALTH CARE EDUCATION/TRAINING PROGRAM

## 2017-02-01 PROCEDURE — 84132 ASSAY OF SERUM POTASSIUM: CPT | Performed by: SURGERY

## 2017-02-01 PROCEDURE — 97530 THERAPEUTIC ACTIVITIES: CPT | Mod: GP

## 2017-02-01 PROCEDURE — 97116 GAIT TRAINING THERAPY: CPT | Mod: GP

## 2017-02-01 RX ORDER — PANTOPRAZOLE SODIUM 40 MG/1
40 TABLET, DELAYED RELEASE ORAL EVERY MORNING
Status: DISCONTINUED | OUTPATIENT
Start: 2017-02-02 | End: 2017-02-10 | Stop reason: HOSPADM

## 2017-02-01 RX ORDER — HYDROMORPHONE HCL/0.9% NACL/PF 0.2MG/0.2
0.2 SYRINGE (ML) INTRAVENOUS
Status: DISCONTINUED | OUTPATIENT
Start: 2017-02-01 | End: 2017-02-10 | Stop reason: HOSPADM

## 2017-02-01 RX ORDER — OXYCODONE HYDROCHLORIDE 5 MG/1
5-10 TABLET ORAL EVERY 4 HOURS PRN
Status: DISCONTINUED | OUTPATIENT
Start: 2017-02-01 | End: 2017-02-01

## 2017-02-01 RX ORDER — LEVOTHYROXINE SODIUM ANHYDROUS 100 UG/5ML
125 INJECTION, POWDER, LYOPHILIZED, FOR SOLUTION INTRAVENOUS EVERY MORNING
Status: DISCONTINUED | OUTPATIENT
Start: 2017-02-01 | End: 2017-02-01 | Stop reason: CLARIF

## 2017-02-01 RX ORDER — ACETAMINOPHEN 500 MG
1000 TABLET ORAL 3 TIMES DAILY
Status: DISCONTINUED | OUTPATIENT
Start: 2017-02-01 | End: 2017-02-10 | Stop reason: HOSPADM

## 2017-02-01 RX ORDER — OXYCODONE HYDROCHLORIDE 5 MG/1
5-10 TABLET ORAL
Status: DISCONTINUED | OUTPATIENT
Start: 2017-02-01 | End: 2017-02-10 | Stop reason: HOSPADM

## 2017-02-01 RX ADMIN — POTASSIUM CHLORIDE 10 MEQ: 7.46 INJECTION, SOLUTION INTRAVENOUS at 00:55

## 2017-02-01 RX ADMIN — ACETAMINOPHEN 1000 MG: 325 TABLET, FILM COATED ORAL at 08:04

## 2017-02-01 RX ADMIN — OXYCODONE HYDROCHLORIDE 5 MG: 5 TABLET ORAL at 08:04

## 2017-02-01 RX ADMIN — OXYCODONE HYDROCHLORIDE 10 MG: 5 TABLET ORAL at 15:45

## 2017-02-01 RX ADMIN — OXYCODONE HYDROCHLORIDE 10 MG: 5 TABLET ORAL at 11:58

## 2017-02-01 RX ADMIN — HEPARIN SODIUM 5000 UNITS: 5000 INJECTION, SOLUTION INTRAVENOUS; SUBCUTANEOUS at 03:54

## 2017-02-01 RX ADMIN — LEVOTHYROXINE SODIUM ANHYDROUS 125 MCG: 100 INJECTION, POWDER, LYOPHILIZED, FOR SOLUTION INTRAVENOUS at 08:04

## 2017-02-01 RX ADMIN — ACETAMINOPHEN 1000 MG: 325 TABLET, FILM COATED ORAL at 19:19

## 2017-02-01 RX ADMIN — OXYCODONE HYDROCHLORIDE 10 MG: 5 TABLET ORAL at 19:08

## 2017-02-01 RX ADMIN — OXYCODONE HYDROCHLORIDE 10 MG: 5 TABLET ORAL at 23:24

## 2017-02-01 RX ADMIN — PANTOPRAZOLE SODIUM 40 MG: 40 INJECTION, POWDER, FOR SOLUTION INTRAVENOUS at 08:05

## 2017-02-01 RX ADMIN — HEPARIN SODIUM 5000 UNITS: 5000 INJECTION, SOLUTION INTRAVENOUS; SUBCUTANEOUS at 12:00

## 2017-02-01 RX ADMIN — POTASSIUM CHLORIDE 10 MEQ: 7.46 INJECTION, SOLUTION INTRAVENOUS at 02:07

## 2017-02-01 RX ADMIN — ACETAMINOPHEN 1000 MG: 325 TABLET, FILM COATED ORAL at 14:43

## 2017-02-01 RX ADMIN — HEPARIN SODIUM 5000 UNITS: 5000 INJECTION, SOLUTION INTRAVENOUS; SUBCUTANEOUS at 19:19

## 2017-02-01 NOTE — PROGRESS NOTES
CLINICAL NUTRITION SERVICES - ASSESSMENT NOTE     Nutrition Prescription    RECOMMENDATIONS FOR MDs/PROVIDERS TO ORDER:  1. Diet adv beyond full liquids v need to consider nutrition support within 2-3 days.  2. Upon discharge, consider referral for outpatient Registered Dietitian if patient with additional nutrition education needs w/ short bowel    Malnutrition Status:    Patient does not meet two of the above criteria necessary for diagnosing malnutrition but is at risk for malnutrition    Recommendations already ordered by Registered Dietitian (RD):  None at this time    Future/Additional Recommendations:  1. Monitor weight trends and PO intakes vs need for additional nutrition intervention  2. If unable to advance diet and/or PO intake poor, consider need to initiate nutrition support  3. Consider ordering chewable or liquid multivitamin     REASON FOR ASSESSMENT  Tammy Borges is a/an 56 year old female assessed by the dietitian for LOS    NUTRITION HISTORY  - Patient reports eating fine PTA.  Patient was following a low-residue diet PTA and avoiding certain foods that made her ostomy outputs higher (blueberries, orange juice, grapefruit, etc.)  Says she tolerates proteins the best and tolerates CHO well (sticky rice, mashed potatoes, pasta w/ plain sauce).  Says she likes pot-pies as well.    - Per chart, on 1/25 pt underwent exploratory laparotomy, extensive lysis of adhesions, takedown of jejunostomy, jejunocolic anastomosis, colonic lavage, open cholecystectomy.  Per op note, pt has 130 cm between the ligament of Treitz and the end of jejunostomy.    - Per past provider note on 7/26, patient was using oral rehydration solution at the time (had a jejunostomy w/ persistent high output (up to 5 L/day))    CURRENT NUTRITION ORDERS  Diet: Full Liquid  Intake/Tolerance: Was NPO 1/25-1/30, clear liquids 1/31, then adv to full liquids later in the day on 1/31.  Ate 100% of 2 meals documented yesterday.  Pt ate ice  "cream, cream of mushroom soup, and milk today and says she tolerated it okay.  Per Care Coordinator note, team believes patient will be on a regular diet at discharge.      LABS  Labs reviewed  - Cr 1.56 (H); GFR 35 (L); Na+ 146 (H) --> per last BMP on 1/29  - K+ has been low 1/29-1/31, is WNL today    MEDICATIONS  Medications reviewed  - Lactated ringers @ 50 mL/hr  - K+/Mg++ replacement protocols    ANTHROPOMETRICS  Height: 165.1 cm (5' 5\")  Most Recent Weight: 113.354 kg (249 lb 14.4 oz)    IBW: 56.8 kg   BMI: Obesity Grade II BMI 35-39.9 based on admit wt of 100.5 kg  Weight History: Wt has trended up this admission above admit wt of 100.5 kg on 1/25. Patient reports her weight has been steady for the past 3 years, fluctuating 215-225#.  Says she noticed she's gained weight since admission despite not eating, RD explained this is likely fluid related  Wt Readings from Last 10 Encounters:   01/31/17 113.354 kg (249 lb 14.4 oz)   01/23/17 101.061 kg (222 lb 12.8 oz)   07/25/16 96.616 kg (213 lb)   07/22/16 103.42 kg (228 lb)   12/07/15 97.977 kg (216 lb)      Dosing Weight: 68 kg (adjusted base on lowest recent wt of 100.5 kg on 1/25 and IBW)    ASSESSED NUTRITION NEEDS  Estimated Energy Needs: 3592-2604 kcals/day (25-30+ kcals/kg)  Justification: Maintenance w/ short gut, weight status  Estimated Protein Needs:  grams protein/day (1.2 - 1.5 grams of pro/kg)  Justification: Increased needs w/ acute illness, post-op  Estimated Fluid Needs: 1 mL/kcal + GI losses,  or per provider  Justification: Maintenance    PHYSICAL FINDINGS  See malnutrition section below.    MALNUTRITION  % Intake: </= 50% for >/= 5 days (severe)  % Weight Loss: None noted, suspect fluid related wt fluctuations involved?  Subcutaneous Fat Loss: None observed  Muscle Loss: None observed  Fluid Accumulation/Edema: None noted per chart review  Malnutrition Diagnosis: Patient does not meet two of the above criteria necessary for diagnosing " malnutrition but is at risk for malnutrition    NUTRITION DIAGNOSIS  Inadequate oral intake related to limitations with diet advancement and abdominal distension, nausea/emesis as evidenced by NPO/Clear liquids x 6 days, full liquid diet x 1 day and patient report of tolerating smaller amounts of food with some pain    INTERVENTIONS  Implementation  Nutrition Education: Provided education on role of RD and nutrition POC.  Patient asking for education on diet management with short gut.  Provided verbal education and wrote down strategies for patient: small, frequent meals; minimize really sugary foods; chew foods well, separate beverages with meals as much as possible; Consider taking a chewable/liquid multivitamin.  Recommend macronutrient breakdown for patient's with short gut w/ colon is 50-60% CHO, 20-30% PRO, and 20-30% fat (% calories).  Encouraged patient to in general eat foods as tolerated.      Goals  1. Diet adv beyond full liquids v need to consider nutrition support within 2-3 days.  2. Patient to consume % of nutritionally adequate meal trays TID, or the equivalent with supplements/snacks.    Monitoring/Evaluation  Progress toward goals will be monitored and evaluated per protocol.     Josseline Jain, JACE, LD   7B RD Pager: [phone]

## 2017-02-01 NOTE — PLAN OF CARE
"Problem: Perioperative Period (Adult)  Goal: Signs and Symptoms of Listed Potential Problems Will be Absent or Manageable (Perioperative Period)  Signs and symptoms of listed potential problems will be absent or manageable by discharge/transition of care (reference Perioperative Period (Adult) CPG).  Outcome: Improving  T-max 99.4, other VSS.  O2 sats 95% on RA.  Abdomen distended/tender.  Pt tolerating clears overnight.  Abdominal inc with small serosang drainage.   SC'd neobladder x 2 with adequate urine output.  Pt states \"adequate\" pain control taking dilaudid PCA at 0.4mg q 10 min prn.          "

## 2017-02-01 NOTE — PLAN OF CARE
Problem: Goal Outcome Summary  Goal: Goal Outcome Summary  PT / 7B: Recommend home at discharge, pt would benefit from Home vs. OP PT services to address strength and balance deficits. Pt ambulated ~200ft with 1 UE support on IV pole for support, requiring CGA-SBA. Pt participated in standing LE Therex. Pt performs sit <> stand transfers SBA.

## 2017-02-01 NOTE — PLAN OF CARE
Problem: Goal Outcome Summary  Goal: Goal Outcome Summary  Outcome: Improving  Pain med changed to oral. Had couple times Oxycodone and Tylenol good relief.  Up in room. Str cath for 500cc irrigated with 150cc clear urine.  Had loose stools and passing gas. Good oral intake. Wound care done by MD's early am. dresssing intact. Continue to follow up per plan of care.

## 2017-02-01 NOTE — PROGRESS NOTES
"General Surgery Progress Note    Subjective  No acute events overnight, passing flatus and BMs. 1 episode of emesis last night, otherwise tolerating liquid diet. Pain well controlled. intermittenly self catheterizing appropriately. No acute complaints at this time.     Objective  /55 mmHg  Pulse 98  Temp(Src) 100.1  F (37.8  C) (Oral)  Resp 16  Ht 1.651 m (5' 5\")  Wt 113.354 kg (249 lb 14.4 oz)  BMI 41.59 kg/m2  SpO2 92%    On Exam  NAD, Awake, oriented  NLB on 2L NC  Abdomen soft, nondistended, appropiately tender.. Incision c/d/i  Ext WWP    I/O last 3 completed shifts:  In: 3269 [P.O.:1440; I.V.:1709; Other:120]  Out: 1700 [Urine:1500; Emesis/NG output:200]    Labs reviewed    WBC     16.0   2/1/2017  RBC     3.34   2/1/2017  HGB      9.7   2/1/2017  HCT     30.6   2/1/2017  No components found with this name: mct  MCV       92   2/1/2017  MCH     29.0   2/1/2017  MCHC     31.7   2/1/2017  RDW     15.0   2/1/2017  PLT      285   2/1/2017    C. Diff negative    Assessment/Plan:  Tammy navarro is a 55 yo lady s/p xlap, cholecystectomy, REMA, jejunostomy takedown with jejunocolic anastomosis, and ventral hernia repair on 1/26    - discontinue PCA, start PO oxy prn  - full liquid diet   - intermittent straight cath q4h  - PT/OT  - PPx: heparin TID subcutaneous, protonix      Disposition: anticipate discharge home in 1-2 days.     Patient seen and discussed with team    Marie Owens MD  Family Medicine PGY1    "

## 2017-02-02 ENCOUNTER — APPOINTMENT (OUTPATIENT)
Dept: PHYSICAL THERAPY | Facility: CLINIC | Age: 57
DRG: 329 | End: 2017-02-02
Attending: SURGERY
Payer: COMMERCIAL

## 2017-02-02 ENCOUNTER — APPOINTMENT (OUTPATIENT)
Dept: CT IMAGING | Facility: CLINIC | Age: 57
DRG: 329 | End: 2017-02-02
Attending: SURGERY
Payer: COMMERCIAL

## 2017-02-02 LAB
ALBUMIN UR-MCNC: 100 MG/DL
ANION GAP SERPL CALCULATED.3IONS-SCNC: 10 MMOL/L (ref 3–14)
APPEARANCE UR: ABNORMAL
BACTERIA #/AREA URNS HPF: ABNORMAL /HPF
BILIRUB UR QL STRIP: NEGATIVE
BUN SERPL-MCNC: 11 MG/DL (ref 7–30)
CALCIUM SERPL-MCNC: 8.2 MG/DL (ref 8.5–10.1)
CHLORIDE SERPL-SCNC: 109 MMOL/L (ref 94–109)
CO2 SERPL-SCNC: 22 MMOL/L (ref 20–32)
COLOR UR AUTO: YELLOW
CREAT SERPL-MCNC: 1.44 MG/DL (ref 0.52–1.04)
ERYTHROCYTE [DISTWIDTH] IN BLOOD BY AUTOMATED COUNT: 15 % (ref 10–15)
GFR SERPL CREATININE-BSD FRML MDRD: 38 ML/MIN/1.7M2
GLUCOSE SERPL-MCNC: 89 MG/DL (ref 70–99)
GLUCOSE UR STRIP-MCNC: NEGATIVE MG/DL
HCT VFR BLD AUTO: 32.1 % (ref 35–47)
HGB BLD-MCNC: 10.2 G/DL (ref 11.7–15.7)
HGB UR QL STRIP: ABNORMAL
KETONES UR STRIP-MCNC: NEGATIVE MG/DL
LEUKOCYTE ESTERASE UR QL STRIP: ABNORMAL
MCH RBC QN AUTO: 28.5 PG (ref 26.5–33)
MCHC RBC AUTO-ENTMCNC: 31.8 G/DL (ref 31.5–36.5)
MCV RBC AUTO: 90 FL (ref 78–100)
MUCOUS THREADS #/AREA URNS LPF: PRESENT /LPF
NITRATE UR QL: POSITIVE
PH UR STRIP: 6.5 PH (ref 5–7)
PLATELET # BLD AUTO: 328 10E9/L (ref 150–450)
POTASSIUM SERPL-SCNC: 3 MMOL/L (ref 3.4–5.3)
RBC # BLD AUTO: 3.58 10E12/L (ref 3.8–5.2)
RBC #/AREA URNS AUTO: 14 /HPF (ref 0–2)
SODIUM SERPL-SCNC: 141 MMOL/L (ref 133–144)
SP GR UR STRIP: 1.01 (ref 1–1.03)
TRANS CELLS #/AREA URNS HPF: 8 /HPF (ref 0–1)
URN SPEC COLLECT METH UR: ABNORMAL
UROBILINOGEN UR STRIP-MCNC: NORMAL MG/DL (ref 0–2)
WBC # BLD AUTO: 16.5 10E9/L (ref 4–11)
WBC #/AREA URNS AUTO: >182 /HPF (ref 0–2)
WBC CLUMPS #/AREA URNS HPF: PRESENT /HPF

## 2017-02-02 PROCEDURE — 25000132 ZZH RX MED GY IP 250 OP 250 PS 637: Performed by: SURGERY

## 2017-02-02 PROCEDURE — 74177 CT ABD & PELVIS W/CONTRAST: CPT

## 2017-02-02 PROCEDURE — 25000132 ZZH RX MED GY IP 250 OP 250 PS 637: Performed by: STUDENT IN AN ORGANIZED HEALTH CARE EDUCATION/TRAINING PROGRAM

## 2017-02-02 PROCEDURE — 97530 THERAPEUTIC ACTIVITIES: CPT | Mod: GP

## 2017-02-02 PROCEDURE — 97116 GAIT TRAINING THERAPY: CPT | Mod: GP

## 2017-02-02 PROCEDURE — 12000008 ZZH R&B INTERMEDIATE UMMC

## 2017-02-02 PROCEDURE — 25000128 H RX IP 250 OP 636: Performed by: STUDENT IN AN ORGANIZED HEALTH CARE EDUCATION/TRAINING PROGRAM

## 2017-02-02 PROCEDURE — 25000128 H RX IP 250 OP 636: Performed by: SURGERY

## 2017-02-02 PROCEDURE — 40000193 ZZH STATISTIC PT WARD VISIT

## 2017-02-02 PROCEDURE — 80048 BASIC METABOLIC PNL TOTAL CA: CPT | Performed by: SURGERY

## 2017-02-02 PROCEDURE — 85027 COMPLETE CBC AUTOMATED: CPT | Performed by: STUDENT IN AN ORGANIZED HEALTH CARE EDUCATION/TRAINING PROGRAM

## 2017-02-02 PROCEDURE — 81001 URINALYSIS AUTO W/SCOPE: CPT | Performed by: STUDENT IN AN ORGANIZED HEALTH CARE EDUCATION/TRAINING PROGRAM

## 2017-02-02 PROCEDURE — 87186 SC STD MICRODIL/AGAR DIL: CPT | Performed by: STUDENT IN AN ORGANIZED HEALTH CARE EDUCATION/TRAINING PROGRAM

## 2017-02-02 PROCEDURE — 36415 COLL VENOUS BLD VENIPUNCTURE: CPT | Performed by: STUDENT IN AN ORGANIZED HEALTH CARE EDUCATION/TRAINING PROGRAM

## 2017-02-02 PROCEDURE — 87088 URINE BACTERIA CULTURE: CPT | Performed by: STUDENT IN AN ORGANIZED HEALTH CARE EDUCATION/TRAINING PROGRAM

## 2017-02-02 PROCEDURE — 36415 COLL VENOUS BLD VENIPUNCTURE: CPT | Performed by: SURGERY

## 2017-02-02 PROCEDURE — 25500064 ZZH RX 255 OP 636: Performed by: STUDENT IN AN ORGANIZED HEALTH CARE EDUCATION/TRAINING PROGRAM

## 2017-02-02 PROCEDURE — 87181 SC STD AGAR DILUTION PER AGT: CPT | Performed by: STUDENT IN AN ORGANIZED HEALTH CARE EDUCATION/TRAINING PROGRAM

## 2017-02-02 PROCEDURE — 87086 URINE CULTURE/COLONY COUNT: CPT | Performed by: STUDENT IN AN ORGANIZED HEALTH CARE EDUCATION/TRAINING PROGRAM

## 2017-02-02 PROCEDURE — 25000128 H RX IP 250 OP 636: Performed by: ANESTHESIOLOGY

## 2017-02-02 RX ORDER — MEROPENEM 500 MG/1
500 INJECTION, POWDER, FOR SOLUTION INTRAVENOUS EVERY 8 HOURS
Status: DISCONTINUED | OUTPATIENT
Start: 2017-02-02 | End: 2017-02-03

## 2017-02-02 RX ORDER — IOPAMIDOL 755 MG/ML
135 INJECTION, SOLUTION INTRAVASCULAR ONCE
Status: COMPLETED | OUTPATIENT
Start: 2017-02-02 | End: 2017-02-02

## 2017-02-02 RX ORDER — AMOXICILLIN 250 MG
1 CAPSULE ORAL 2 TIMES DAILY
Status: DISCONTINUED | OUTPATIENT
Start: 2017-02-02 | End: 2017-02-06

## 2017-02-02 RX ORDER — POLYETHYLENE GLYCOL 3350 17 G/17G
17 POWDER, FOR SOLUTION ORAL DAILY
Status: DISCONTINUED | OUTPATIENT
Start: 2017-02-02 | End: 2017-02-10 | Stop reason: HOSPADM

## 2017-02-02 RX ORDER — ALUMINA, MAGNESIA, AND SIMETHICONE 2400; 2400; 240 MG/30ML; MG/30ML; MG/30ML
30 SUSPENSION ORAL EVERY 4 HOURS PRN
Status: DISCONTINUED | OUTPATIENT
Start: 2017-02-02 | End: 2017-02-10 | Stop reason: HOSPADM

## 2017-02-02 RX ADMIN — ONDANSETRON 4 MG: 2 INJECTION INTRAMUSCULAR; INTRAVENOUS at 00:55

## 2017-02-02 RX ADMIN — ACETAMINOPHEN 1000 MG: 325 TABLET, FILM COATED ORAL at 14:52

## 2017-02-02 RX ADMIN — SENNOSIDES AND DOCUSATE SODIUM 1 TABLET: 8.6; 5 TABLET ORAL at 19:33

## 2017-02-02 RX ADMIN — OXYCODONE HYDROCHLORIDE 10 MG: 5 TABLET ORAL at 06:15

## 2017-02-02 RX ADMIN — OXYCODONE HYDROCHLORIDE 10 MG: 5 TABLET ORAL at 17:03

## 2017-02-02 RX ADMIN — POTASSIUM CHLORIDE 10 MEQ: 7.46 INJECTION, SOLUTION INTRAVENOUS at 17:42

## 2017-02-02 RX ADMIN — OXYCODONE HYDROCHLORIDE 10 MG: 5 TABLET ORAL at 10:45

## 2017-02-02 RX ADMIN — POTASSIUM CHLORIDE 10 MEQ: 7.46 INJECTION, SOLUTION INTRAVENOUS at 18:52

## 2017-02-02 RX ADMIN — IOPAMIDOL 135 ML: 755 INJECTION, SOLUTION INTRAVENOUS at 18:23

## 2017-02-02 RX ADMIN — OXYCODONE HYDROCHLORIDE 10 MG: 5 TABLET ORAL at 20:04

## 2017-02-02 RX ADMIN — ACETAMINOPHEN 1000 MG: 325 TABLET, FILM COATED ORAL at 08:38

## 2017-02-02 RX ADMIN — LORAZEPAM 0.5 MG: 2 INJECTION INTRAMUSCULAR; INTRAVENOUS at 08:54

## 2017-02-02 RX ADMIN — OXYCODONE HYDROCHLORIDE 10 MG: 5 TABLET ORAL at 03:30

## 2017-02-02 RX ADMIN — SODIUM CHLORIDE 1000 ML: 9 INJECTION, SOLUTION INTRAVENOUS at 15:25

## 2017-02-02 RX ADMIN — LEVOTHYROXINE SODIUM 275 MCG: 175 TABLET ORAL at 08:02

## 2017-02-02 RX ADMIN — PROCHLORPERAZINE EDISYLATE 10 MG: 5 INJECTION INTRAMUSCULAR; INTRAVENOUS at 03:40

## 2017-02-02 RX ADMIN — POTASSIUM CHLORIDE 10 MEQ: 7.46 INJECTION, SOLUTION INTRAVENOUS at 21:47

## 2017-02-02 RX ADMIN — POLYETHYLENE GLYCOL 3350 17 G: 17 POWDER, FOR SOLUTION ORAL at 08:03

## 2017-02-02 RX ADMIN — POTASSIUM CHLORIDE 10 MEQ: 7.46 INJECTION, SOLUTION INTRAVENOUS at 20:05

## 2017-02-02 RX ADMIN — SENNOSIDES AND DOCUSATE SODIUM 1 TABLET: 8.6; 5 TABLET ORAL at 08:03

## 2017-02-02 RX ADMIN — MEROPENEM 500 MG: 500 INJECTION, POWDER, FOR SOLUTION INTRAVENOUS at 21:15

## 2017-02-02 RX ADMIN — LORAZEPAM 0.5 MG: 2 INJECTION INTRAMUSCULAR; INTRAVENOUS at 17:39

## 2017-02-02 RX ADMIN — PANTOPRAZOLE SODIUM 40 MG: 40 TABLET, DELAYED RELEASE ORAL at 08:03

## 2017-02-02 RX ADMIN — ACETAMINOPHEN 1000 MG: 325 TABLET, FILM COATED ORAL at 19:31

## 2017-02-02 RX ADMIN — SODIUM PHOSPHATE 1 ENEMA: 7; 19 ENEMA RECTAL at 08:03

## 2017-02-02 NOTE — PLAN OF CARE
"Problem: Perioperative Period (Adult)  Goal: Signs and Symptoms of Listed Potential Problems Will be Absent or Manageable (Perioperative Period)  Signs and symptoms of listed potential problems will be absent or manageable by discharge/transition of care (reference Perioperative Period (Adult) CPG).   Outcome: No Change  AVSS.  O2 sats 96% on RA.  Abdomen rounded/tender.  Pt c/o intermittent nausea overnight and received zofran and compazine with \"partial\" relief.  Abdominal dressing CDI.  Adequate urine output via bridget-bladder.  Pt states \"adequate\" pain control taking oxycodone 10mg q 3hrs prn.          "

## 2017-02-02 NOTE — PLAN OF CARE
Problem: Goal Outcome Summary  Goal: Goal Outcome Summary  Outcome: Improving  Filed Vitals:     02/01/17 1637 02/01/17 2336 02/02/17 0822 02/02/17 1538   BP: 114/74 103/66 113/66 92/57   Pulse:           Temp: 97.1  F (36.2  C) 97.6  F (36.4  C) 99  F (37.2  C) 96.5  F (35.8  C)   TempSrc: Oral Oral Oral Oral   Resp: 20 20 20 20   Height:           Weight:           SpO2: 100% 96% 93% 94%   Pain well managed with PCA Morphine and scheduled TYlenol. Pt up in halls bind. No passing gas and hypo BS x 4 quadrant. NG clamped for 2hrs and by bam fell off accidentally when pt was blowing her nose. MD notified. Pt remains NPO and will have swallow study tomorrow at 8am. Pt aware.  Wound vac  Patent and dressing clean dry and intact.  Continue to follow up per plan of care.

## 2017-02-02 NOTE — PLAN OF CARE
Problem: Goal Outcome Summary  Goal: Goal Outcome Summary  Outcome: Improving  VSS. Dressing to midline CDI. Straight cath and irrigate neobladder per self. 250 ml urine output. Loose stools continue. Pt reports passing lots of flatus and feeling better/less abdominal discomfort. Prn 2 tab of oxycodone + scheduled tylenol adequate for pain control. Tolerating full liquids, denies nausea. Up ind in room. Pt reports being very tired today.

## 2017-02-02 NOTE — PROGRESS NOTES
"General Surgery Progress Note    Subjective  No acute events overnight, passing flatus and liquid stools. Tolerating full liquid diet. Pain well controlled. intermittenly self catheterizing appropriately. No acute complaints at this time.     Objective  /66 mmHg  Pulse 98  Temp(Src) 99  F (37.2  C) (Oral)  Resp 20  Ht 1.651 m (5' 5\")  Wt 113.354 kg (249 lb 14.4 oz)  BMI 41.59 kg/m2  SpO2 93%    On Exam  NAD, Awake, oriented  NLB on 2L NC  Abdomen soft, nondistended, appropiately tender. Incision c/d/i  Ext WWP    I/O last 3 completed shifts:  In: 2772.5 [P.O.:1130; I.V.:1242.5; Other:400]  Out: 1775 [Urine:1775]    Labs reviewed. WBC yesterday 2/1/17 elevated at 16.0, VSS, afebrile     Assessment/Plan:  Tammy navarro is a 57 yo lady s/p xlap, cholecystectomy, REMA, jejunostomy takedown with jejunocolic anastomosis, and ventral hernia repair on 1/26    - enema and bowel regimen: miralax and senokat   - continue PO oxy prn  - NPO for now, f/u CBC today for WBC  - If WBC >16,000, will get CT abd w/ PO and IV contrast   - intermittent straight cath q4h  - PT/OT  - PPx: heparin TID subcutaneous, protonix      Disposition: anticipate discharge home in 1-2 days.     Patient seen and discussed with team    Marie Owens MD  Family Medicine PGY1    "

## 2017-02-02 NOTE — PLAN OF CARE
Problem: Goal Outcome Summary  Goal: Goal Outcome Summary  Outcome: No Change  Filed Vitals:     02/01/17 1637 02/01/17 2336 02/02/17 0822 02/02/17 1538   BP: 114/74 103/66 113/66 92/57   Pulse:           Temp: 97.1  F (36.2  C) 97.6  F (36.4  C) 99  F (37.2  C) 96.5  F (35.8  C)   TempSrc: Oral Oral Oral Oral   Resp: 20 20 20 20   Height:           Weight:           SpO2: 100% 96% 93% 94%     Filed Vitals:     02/01/17 1637 02/01/17 2336 02/02/17 0822 02/02/17 1538   BP: 114/74 103/66 113/66 92/57   Pulse:           Temp: 97.1  F (36.2  C) 97.6  F (36.4  C) 99  F (37.2  C) 96.5  F (35.8  C)   TempSrc: Oral Oral Oral Oral   Resp: 20 20 20 20   Height:           Weight:           SpO2: 100% 96% 93% 94%   Pt has been anxious and tearfully early this day. Writer redirected pt and explain plan of care for the day. Pt did not like the idea of NPO and fleets, then she agreed with the orders. Ativan given  And pain med as needed pt rested and verbalized felling better.  Had several loose stools and str cath 600cc irrigated with 120. Urine cloudy/eddie yellow. Showered with SBA. Worked with PT (see notes)  Plan to have CT with oral contrast at 6pm today. Pt aware.  NS bolus started as ordered.  Continue to follow up per plan of care.

## 2017-02-03 ENCOUNTER — APPOINTMENT (OUTPATIENT)
Dept: GENERAL RADIOLOGY | Facility: CLINIC | Age: 57
DRG: 329 | End: 2017-02-03
Attending: SURGERY
Payer: COMMERCIAL

## 2017-02-03 LAB
ANION GAP SERPL CALCULATED.3IONS-SCNC: 14 MMOL/L (ref 3–14)
BUN SERPL-MCNC: 11 MG/DL (ref 7–30)
CALCIUM SERPL-MCNC: 7.8 MG/DL (ref 8.5–10.1)
CHLORIDE SERPL-SCNC: 114 MMOL/L (ref 94–109)
CO2 SERPL-SCNC: 16 MMOL/L (ref 20–32)
CREAT SERPL-MCNC: 1.33 MG/DL (ref 0.52–1.04)
ERYTHROCYTE [DISTWIDTH] IN BLOOD BY AUTOMATED COUNT: 15.1 % (ref 10–15)
GFR SERPL CREATININE-BSD FRML MDRD: 41 ML/MIN/1.7M2
GLUCOSE SERPL-MCNC: 72 MG/DL (ref 70–99)
HCT VFR BLD AUTO: 30.6 % (ref 35–47)
HGB BLD-MCNC: 9.9 G/DL (ref 11.7–15.7)
MCH RBC QN AUTO: 28.5 PG (ref 26.5–33)
MCHC RBC AUTO-ENTMCNC: 32.4 G/DL (ref 31.5–36.5)
MCV RBC AUTO: 88 FL (ref 78–100)
PLATELET # BLD AUTO: 378 10E9/L (ref 150–450)
POTASSIUM SERPL-SCNC: 3.2 MMOL/L (ref 3.4–5.3)
POTASSIUM SERPL-SCNC: 3.2 MMOL/L (ref 3.4–5.3)
POTASSIUM SERPL-SCNC: 3.3 MMOL/L (ref 3.4–5.3)
RBC # BLD AUTO: 3.47 10E12/L (ref 3.8–5.2)
SODIUM SERPL-SCNC: 144 MMOL/L (ref 133–144)
WBC # BLD AUTO: 15.9 10E9/L (ref 4–11)

## 2017-02-03 PROCEDURE — 71010 XR CHEST PORT 1 VW: CPT

## 2017-02-03 PROCEDURE — 80048 BASIC METABOLIC PNL TOTAL CA: CPT | Performed by: SURGERY

## 2017-02-03 PROCEDURE — 25000132 ZZH RX MED GY IP 250 OP 250 PS 637: Performed by: SURGERY

## 2017-02-03 PROCEDURE — 25000128 H RX IP 250 OP 636: Performed by: SURGERY

## 2017-02-03 PROCEDURE — 84132 ASSAY OF SERUM POTASSIUM: CPT | Performed by: SURGERY

## 2017-02-03 PROCEDURE — 25000125 ZZHC RX 250: Performed by: SURGERY

## 2017-02-03 PROCEDURE — 25000132 ZZH RX MED GY IP 250 OP 250 PS 637: Performed by: STUDENT IN AN ORGANIZED HEALTH CARE EDUCATION/TRAINING PROGRAM

## 2017-02-03 PROCEDURE — 25000128 H RX IP 250 OP 636: Performed by: STUDENT IN AN ORGANIZED HEALTH CARE EDUCATION/TRAINING PROGRAM

## 2017-02-03 PROCEDURE — 25000128 H RX IP 250 OP 636: Performed by: ANESTHESIOLOGY

## 2017-02-03 PROCEDURE — 40000558 ZZH STATISTIC CVC DRESSING CHANGE

## 2017-02-03 PROCEDURE — 12000008 ZZH R&B INTERMEDIATE UMMC

## 2017-02-03 PROCEDURE — 36415 COLL VENOUS BLD VENIPUNCTURE: CPT | Performed by: SURGERY

## 2017-02-03 PROCEDURE — 85027 COMPLETE CBC AUTOMATED: CPT | Performed by: SURGERY

## 2017-02-03 RX ORDER — MEROPENEM 500 MG/1
500 INJECTION, POWDER, FOR SOLUTION INTRAVENOUS EVERY 6 HOURS
Status: DISCONTINUED | OUTPATIENT
Start: 2017-02-03 | End: 2017-02-10 | Stop reason: HOSPADM

## 2017-02-03 RX ADMIN — POTASSIUM CHLORIDE 10 MEQ: 7.46 INJECTION, SOLUTION INTRAVENOUS at 03:41

## 2017-02-03 RX ADMIN — MEROPENEM 500 MG: 500 INJECTION, POWDER, FOR SOLUTION INTRAVENOUS at 11:38

## 2017-02-03 RX ADMIN — POTASSIUM CHLORIDE 10 MEQ: 7.46 INJECTION, SOLUTION INTRAVENOUS at 01:41

## 2017-02-03 RX ADMIN — POTASSIUM CHLORIDE 10 MEQ: 7.46 INJECTION, SOLUTION INTRAVENOUS at 02:40

## 2017-02-03 RX ADMIN — ONDANSETRON 4 MG: 2 INJECTION INTRAMUSCULAR; INTRAVENOUS at 19:34

## 2017-02-03 RX ADMIN — MEROPENEM 500 MG: 500 INJECTION, POWDER, FOR SOLUTION INTRAVENOUS at 16:14

## 2017-02-03 RX ADMIN — OXYCODONE HYDROCHLORIDE 10 MG: 5 TABLET ORAL at 15:06

## 2017-02-03 RX ADMIN — POTASSIUM CHLORIDE 10 MEQ: 14.9 INJECTION, SOLUTION, CONCENTRATE PARENTERAL at 22:02

## 2017-02-03 RX ADMIN — OXYCODONE HYDROCHLORIDE 10 MG: 5 TABLET ORAL at 03:44

## 2017-02-03 RX ADMIN — MEROPENEM 500 MG: 500 INJECTION, POWDER, FOR SOLUTION INTRAVENOUS at 04:37

## 2017-02-03 RX ADMIN — LORAZEPAM 0.5 MG: 2 INJECTION INTRAMUSCULAR; INTRAVENOUS at 06:05

## 2017-02-03 RX ADMIN — OXYCODONE HYDROCHLORIDE 10 MG: 5 TABLET ORAL at 08:05

## 2017-02-03 RX ADMIN — POTASSIUM CHLORIDE 10 MEQ: 14.9 INJECTION, SOLUTION, CONCENTRATE PARENTERAL at 13:35

## 2017-02-03 RX ADMIN — ONDANSETRON 4 MG: 2 INJECTION INTRAMUSCULAR; INTRAVENOUS at 00:55

## 2017-02-03 RX ADMIN — POTASSIUM CHLORIDE 10 MEQ: 14.9 INJECTION, SOLUTION, CONCENTRATE PARENTERAL at 17:38

## 2017-02-03 RX ADMIN — POTASSIUM CHLORIDE 10 MEQ: 14.9 INJECTION, SOLUTION, CONCENTRATE PARENTERAL at 12:30

## 2017-02-03 RX ADMIN — ACETAMINOPHEN 1000 MG: 325 TABLET, FILM COATED ORAL at 08:06

## 2017-02-03 RX ADMIN — OXYCODONE HYDROCHLORIDE 10 MG: 5 TABLET ORAL at 00:49

## 2017-02-03 RX ADMIN — LEVOTHYROXINE SODIUM 275 MCG: 175 TABLET ORAL at 08:06

## 2017-02-03 RX ADMIN — POTASSIUM CHLORIDE 10 MEQ: 14.9 INJECTION, SOLUTION, CONCENTRATE PARENTERAL at 15:06

## 2017-02-03 RX ADMIN — PANTOPRAZOLE SODIUM 40 MG: 40 TABLET, DELAYED RELEASE ORAL at 08:06

## 2017-02-03 RX ADMIN — OXYCODONE HYDROCHLORIDE 10 MG: 5 TABLET ORAL at 12:29

## 2017-02-03 RX ADMIN — LORAZEPAM 0.5 MG: 2 INJECTION INTRAMUSCULAR; INTRAVENOUS at 13:14

## 2017-02-03 RX ADMIN — POTASSIUM CHLORIDE 10 MEQ: 14.9 INJECTION, SOLUTION, CONCENTRATE PARENTERAL at 23:03

## 2017-02-03 RX ADMIN — ACETAMINOPHEN 1000 MG: 325 TABLET, FILM COATED ORAL at 13:15

## 2017-02-03 RX ADMIN — POTASSIUM CHLORIDE 10 MEQ: 7.46 INJECTION, SOLUTION INTRAVENOUS at 04:41

## 2017-02-03 RX ADMIN — HYDROMORPHONE HYDROCHLORIDE 0.2 MG: 10 INJECTION, SOLUTION INTRAMUSCULAR; INTRAVENOUS; SUBCUTANEOUS at 19:48

## 2017-02-03 ASSESSMENT — PAIN DESCRIPTION - DESCRIPTORS: DESCRIPTORS: ACHING

## 2017-02-03 NOTE — PLAN OF CARE
Problem: Goal Outcome Summary  Goal: Goal Outcome Summary  PT/7B: Attempted to see pt in AM and PM, declining both attempts due to fatigue and not wanting to get up. Encouragement provided however pt still refusing

## 2017-02-03 NOTE — PLAN OF CARE
"Problem: Individualization  Goal: Patient Preferences  Outcome: No Change  ./63 mmHg  Pulse 98  Temp(Src) 98.3  F (36.8  C) (Oral)  Resp 20  Ht 1.651 m (5' 5\")  Wt 106.595 kg (235 lb)  BMI 39.11 kg/m2  SpO2 96%     Patient receiving PO pain meds; midline incision covered and abdominal binder in place; having frequent stools and self cathing her neobladder; K recheck was 3.2 and replacement being infused via left single-lumen PICC; up independently and appeared to sleep okay between cares        "

## 2017-02-03 NOTE — PLAN OF CARE
Problem: Goal Outcome Summary  Goal: Goal Outcome Summary  Outcome: No Change  VSS, afebrile. UA sent and called MD with results, MD ordered merrem. K+ 3.0, replaced per IV, recheck at 0100. CT of abd/pelvis this evening, see results. IV bolus finished this shift. Remains NPO. Straight cathed x2 for 150ml and 600ml. Continues to have loose BMs. Oxycodone and scheduled tylenol for pain to abdomen. Dressing CDI. Ativan prn for anxiety.

## 2017-02-03 NOTE — PLAN OF CARE
Problem: Goal Outcome Summary  Goal: Goal Outcome Summary  PT: Pt transfers SBA with education on technique to decrease abdominal strain. Pt able to ambulate 200' and 350' with IV pole for support due to pain today, slow pace overall. Recommend discharge to home with A and continue with home PT vs OP PT to progress functional strength, activity tolerance.

## 2017-02-03 NOTE — PROGRESS NOTES
"General Surgery Progress Note    Subjective: No acute issues overnight. Pain adequately controlled.  Passing flatus, liquid BM after enema x1 yesterday. Denies fevers, chills, nausea, or vomiting. 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     Objective:   /63 mmHg  Pulse 98  Temp(Src) 98.3  F (36.8  C) (Oral)  Resp 20  Ht 1.651 m (5' 5\")  Wt 106.595 kg (235 lb)  BMI 39.11 kg/m2  SpO2 96%    PEx:  Gen: Awake, alert, NAD   CV: Normal rate, regular rhythm  Resp: non-labored at rest, CTAB  Abd: Soft, slightly distended. Appropriately tender at incisions. Incisions clean, dry, intact, no erythema.   Ext: warm and well perfused    I/O:  I/O last 3 completed shifts:  In: 2279 [P.O.:480; I.V.:1799]  Out: 1650 [Urine:1650]    Labs: Reviewed. Urine nitrite positive, cultures pending. WBC 15.9 (unchanged since 1/31), K 3.3, Cr 1.33.     Imaging: Reviewed. CT abd/pelvis 2/2/17 with 6 x 3 cm rim-enhancing fluid collection near anastomosis site, concerning for infection/developing abscess.     A/P: Tammy Borges is a 56 year old lady, who is POD #9  following exploratory laparotomy, cholecystectomy, jejunostomy takedown, jejunocolic anastomosis, and ventral hernia repair. Leukocytosis since 1/31, afebrile; UTI noted on UA and CT demonstrates fluid collection (infection/abscess).    - pain control: oral medications   - PT/OT   - Meropenem for UTI, hx of ESBL   - NPO until discussed CT with Dr. Coats, then ADAT  - DVT Prophylaxis: Lovenox  - trend WBC  - discontinue enemas   - chaplaincy consult per patient request     Dispo: Home pending tolerance of regular diet, pain control by PO medications     Seen and discussed with team     Isidro Edwards, MS III  -------------------------------------------------------------------------------------------------  Agree with medical student note above    Tammy is a 55 yo F, POD9 following ex lap, cholecystectomy, jejunostomy " takedown, jejunocolic anastamosis, and ventral hernia repair. Leukocytosis since 1/31, trending down today; VSS, afebrile. UA positive for UTI, started on meropenen due to history of ESBL. CT abd 2/2 shows fluid collecteion near anastamosis, concerning for infection and possible developing abscess. On review of CT, abscess does not appear large enough to require drain placement. Will discuss further with Dr. Coats.    -continue with meropenem; f/u urine culture and sensitivities   -NPO pending further discussion with Dr. Coats  -pain control with PO pain meds  -PT/OT  -ppx: lovenox  -discontinue enemas  - consult placed per patient request    Patient seen and discussed with team and chief Dr. Bibi Owens MD  Family Medicine PGY1

## 2017-02-03 NOTE — PLAN OF CARE
Problem: Goal Outcome Summary  Goal: Goal Outcome Summary  Outcome: No Change  B/P: 115/66, T: 99.1, P: 98, R: 20  Patient states pain is tolerated with Oxycodone every 3 hours.  Abdomen soft with bowel sounds present. Abdominal incision with staples intact. Patient with neobladder stoma, self catheterizes for adequate amount of urine.  Patient states she has a few loose bowel movements today. Left arm PICC is out of place so orders from vascular are to treat it like it were a midline.  K+ of 3.3 being replaced with 10 meq x 3, and will need one more bag.  Ativan given x 1.  Up with SBA.  Patient refused heparin after education of importance of receiving it.  Continue with POC.

## 2017-02-03 NOTE — PROGRESS NOTES
"SPIRITUAL HEALTH SERVICES  SPIRITUAL ASSESSMENT Progress Note  [facility] ([place]) 7B    PRIMARY FOCUS:     Goals of care    Emotional/spiritual/Amish distress    ILLNESS CIRCUMSTANCES:   Responded to EPIC consult for emotional support. Reviewed documentation. Reflective conversation shared with pt Tmamy which integrated elements of illness and family narratives.     Context of Serious Illness/Symptom(s) - hospitalized for surgery received on Wednesday, developed an infection this morning    Resources for Support - dtr Aziza (14 years old)    DISTRESS:     Emotional/Existential/Relational Distress - Tammy noted that today \"has been a harder day,\" particularly in reference to her new infection. She reflected on the past few years to contextualize the compounding of emotional distress: \"My dtr's dad and I split up, and then he . It's just the two of us now. I cannot die.\" She and her dtr are missing one another.    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 She reflected with fondness and nostalgia about where she used to live in [facility], the [facility] in [facility], and what the [place] used to be like. She lived in [place] for 25 years following law school, and now lives in [place].     GOALS OF CARE:    Goals of Care - recover from infection, return home to [place] as soon as she can    Meaning/Sense-Making - Tammy shared some photos of Aziza, and some of her own hobbies at home, particularly cooking and destini.     PLAN: Follow-up 1-2x/week as Tammy remains on 7B.                                                 "                                                                                             Emily Rodrigues M.T.S.   [facility]  Pager [phone]

## 2017-02-04 LAB
LACTATE BLD-SCNC: 0.6 MMOL/L (ref 0.7–2.1)
POTASSIUM SERPL-SCNC: 3.4 MMOL/L (ref 3.4–5.3)

## 2017-02-04 PROCEDURE — 12000008 ZZH R&B INTERMEDIATE UMMC

## 2017-02-04 PROCEDURE — 25000125 ZZHC RX 250: Performed by: SURGERY

## 2017-02-04 PROCEDURE — 25000132 ZZH RX MED GY IP 250 OP 250 PS 637: Performed by: SURGERY

## 2017-02-04 PROCEDURE — 25000128 H RX IP 250 OP 636: Performed by: STUDENT IN AN ORGANIZED HEALTH CARE EDUCATION/TRAINING PROGRAM

## 2017-02-04 PROCEDURE — 83605 ASSAY OF LACTIC ACID: CPT | Performed by: SURGERY

## 2017-02-04 PROCEDURE — 25000128 H RX IP 250 OP 636: Performed by: SURGERY

## 2017-02-04 PROCEDURE — 25000132 ZZH RX MED GY IP 250 OP 250 PS 637: Performed by: STUDENT IN AN ORGANIZED HEALTH CARE EDUCATION/TRAINING PROGRAM

## 2017-02-04 PROCEDURE — 84132 ASSAY OF SERUM POTASSIUM: CPT | Performed by: SURGERY

## 2017-02-04 PROCEDURE — 40000802 ZZH SITE CHECK

## 2017-02-04 RX ADMIN — POTASSIUM CHLORIDE 10 MEQ: 14.9 INJECTION, SOLUTION, CONCENTRATE PARENTERAL at 09:26

## 2017-02-04 RX ADMIN — POTASSIUM CHLORIDE 10 MEQ: 14.9 INJECTION, SOLUTION, CONCENTRATE PARENTERAL at 10:44

## 2017-02-04 RX ADMIN — OXYCODONE HYDROCHLORIDE 10 MG: 5 TABLET ORAL at 00:39

## 2017-02-04 RX ADMIN — OXYCODONE HYDROCHLORIDE 10 MG: 5 TABLET ORAL at 06:33

## 2017-02-04 RX ADMIN — POTASSIUM CHLORIDE 10 MEQ: 7.46 INJECTION, SOLUTION INTRAVENOUS at 00:19

## 2017-02-04 RX ADMIN — ONDANSETRON 4 MG: 2 INJECTION INTRAMUSCULAR; INTRAVENOUS at 06:33

## 2017-02-04 RX ADMIN — LORAZEPAM 0.5 MG: 2 INJECTION INTRAMUSCULAR; INTRAVENOUS at 00:39

## 2017-02-04 RX ADMIN — MEROPENEM 500 MG: 500 INJECTION, POWDER, FOR SOLUTION INTRAVENOUS at 00:19

## 2017-02-04 RX ADMIN — PANTOPRAZOLE SODIUM 40 MG: 40 TABLET, DELAYED RELEASE ORAL at 08:33

## 2017-02-04 RX ADMIN — OXYCODONE HYDROCHLORIDE 10 MG: 5 TABLET ORAL at 12:23

## 2017-02-04 RX ADMIN — MEROPENEM 500 MG: 500 INJECTION, POWDER, FOR SOLUTION INTRAVENOUS at 11:52

## 2017-02-04 RX ADMIN — MEROPENEM 500 MG: 500 INJECTION, POWDER, FOR SOLUTION INTRAVENOUS at 17:32

## 2017-02-04 RX ADMIN — POTASSIUM CHLORIDE 10 MEQ: 14.9 INJECTION, SOLUTION, CONCENTRATE PARENTERAL at 14:50

## 2017-02-04 RX ADMIN — OXYCODONE HYDROCHLORIDE 10 MG: 5 TABLET ORAL at 20:56

## 2017-02-04 RX ADMIN — ACETAMINOPHEN 1000 MG: 325 TABLET, FILM COATED ORAL at 08:32

## 2017-02-04 RX ADMIN — POTASSIUM CHLORIDE 10 MEQ: 14.9 INJECTION, SOLUTION, CONCENTRATE PARENTERAL at 13:16

## 2017-02-04 RX ADMIN — POTASSIUM CHLORIDE 10 MEQ: 7.46 INJECTION, SOLUTION INTRAVENOUS at 02:16

## 2017-02-04 RX ADMIN — PROCHLORPERAZINE EDISYLATE 5 MG: 5 INJECTION INTRAMUSCULAR; INTRAVENOUS at 00:39

## 2017-02-04 RX ADMIN — MEROPENEM 500 MG: 500 INJECTION, POWDER, FOR SOLUTION INTRAVENOUS at 06:09

## 2017-02-04 RX ADMIN — MEROPENEM 500 MG: 500 INJECTION, POWDER, FOR SOLUTION INTRAVENOUS at 23:56

## 2017-02-04 RX ADMIN — ACETAMINOPHEN 1000 MG: 325 TABLET, FILM COATED ORAL at 15:17

## 2017-02-04 RX ADMIN — LEVOTHYROXINE SODIUM 275 MCG: 175 TABLET ORAL at 08:31

## 2017-02-04 RX ADMIN — ACETAMINOPHEN 650 MG: 325 TABLET, FILM COATED ORAL at 23:56

## 2017-02-04 NOTE — PLAN OF CARE
Problem: Individualization  Goal: Patient Preferences  Outcome: No Change  Stool x1.Midline incision is intact with small drainage, abdominal binder intact. Emesis/450ml, notified on-call surgical resident/ zofran may be effective. Ambulatory within the room with no discomfort. K level replaced/redraw at 3.2, continue with protocol. Hydromorphone for pain. PICC site is intact. Continue with plan of care.

## 2017-02-04 NOTE — PROGRESS NOTES
D: Member w/o c/o nausea this shift. Switched to FLD but still just really sticking to clears.  Has had two clear Ensure this shift and is aiming for 4 before bed. States that the clears sit with her better so far.  Patient tells nurse that her urine is back to its normal color as was previously dark before--color is bright yellow.  Patient states pain controlled.  Has had 3 BM early AM but none since. Paient states she can tell she needs to nutrition as feels slightly better after the two Ensure.  A: Member doing well with clear liquids  P Member to try BOOST later today to see how it goes.  Patient had contracted with resident to try the BOOST and aim for two.

## 2017-02-04 NOTE — PLAN OF CARE
"Problem: Infection, Risk/Actual (Adult)  Goal: Identify Related Risk Factors and Signs and Symptoms  Related risk factors and signs and symptoms are identified upon initiation of Human Response Clinical Practice Guideline (CPG)   Outcome: No Change  .BP 98/56 mmHg  Pulse 98  Temp(Src) 97.5  F (36.4  C) (Oral)  Resp 18  Ht 1.651 m (5' 5\")  Wt 103.783 kg (228 lb 12.8 oz)  BMI 38.07 kg/m2  SpO2 95%     Patient received PO pain meds, IV ativan and IV compazine; K recheck was 3.2 and replacement given; left PICC has been displaced and now considered a midline; midline incision covered and abdominal binder in place; patient up independently having frequent stools and cathing her neobladder; appeared to sleep well throughout the night        "

## 2017-02-04 NOTE — PROGRESS NOTES
"GENERAL SURGERY PROGRESS NOTE    One episode of emesis yesterday after drinking 2 small cans of sprite. Diet then changed from FLD to CLD. +flatus, +BMs    /58 mmHg  Pulse 98  Temp(Src) 99.9  F (37.7  C) (Oral)  Resp 24  Ht 1.651 m (5' 5\")  Wt 106.505 kg (234 lb 12.8 oz)  BMI 39.07 kg/m2  SpO2 93%    I/O last 3 completed shifts:  In: 1220 [P.O.:420; I.V.:800]  Out: 2050 [Urine:1600; Emesis/NG output:450]    NAD, AAOx3  RRR  NLB on room air  Abd soft, slightly distended, appropriately tender at incisions, incisions C/D/I  Ext WWP    A/P  Tammy is a 57 yo F, POD#10 following ex lap, cholecystectomy, jejunostomy takedown, jejunocolic anastomosis and ventral hernia repair. Urine culture growing Klebsiella pneumoniae ESBL, sensitivities pending    -continue with IV meropenem  -advanced diet back to FLD with supplements  -pain control with PO pain meds  -PT/OT  -PPX: lovenox, protonix    Patient seen and discussed with chief resident Dr. Bibi Owens MD  Family Medicine PGY1  "

## 2017-02-05 LAB
ERYTHROCYTE [DISTWIDTH] IN BLOOD BY AUTOMATED COUNT: 15.4 % (ref 10–15)
HCT VFR BLD AUTO: 29.9 % (ref 35–47)
HGB BLD-MCNC: 9.6 G/DL (ref 11.7–15.7)
MCH RBC QN AUTO: 28.5 PG (ref 26.5–33)
MCHC RBC AUTO-ENTMCNC: 32.1 G/DL (ref 31.5–36.5)
MCV RBC AUTO: 89 FL (ref 78–100)
PLATELET # BLD AUTO: 444 10E9/L (ref 150–450)
POTASSIUM SERPL-SCNC: 2.6 MMOL/L (ref 3.4–5.3)
POTASSIUM SERPL-SCNC: 2.8 MMOL/L (ref 3.4–5.3)
RBC # BLD AUTO: 3.37 10E12/L (ref 3.8–5.2)
WBC # BLD AUTO: 17.3 10E9/L (ref 4–11)

## 2017-02-05 PROCEDURE — 25000128 H RX IP 250 OP 636: Performed by: SURGERY

## 2017-02-05 PROCEDURE — 25000132 ZZH RX MED GY IP 250 OP 250 PS 637: Performed by: STUDENT IN AN ORGANIZED HEALTH CARE EDUCATION/TRAINING PROGRAM

## 2017-02-05 PROCEDURE — 25000128 H RX IP 250 OP 636: Performed by: STUDENT IN AN ORGANIZED HEALTH CARE EDUCATION/TRAINING PROGRAM

## 2017-02-05 PROCEDURE — 12000008 ZZH R&B INTERMEDIATE UMMC

## 2017-02-05 PROCEDURE — 85027 COMPLETE CBC AUTOMATED: CPT | Performed by: STUDENT IN AN ORGANIZED HEALTH CARE EDUCATION/TRAINING PROGRAM

## 2017-02-05 PROCEDURE — 25000125 ZZHC RX 250: Performed by: SURGERY

## 2017-02-05 PROCEDURE — 25000132 ZZH RX MED GY IP 250 OP 250 PS 637: Performed by: SURGERY

## 2017-02-05 PROCEDURE — 40000141 ZZH STATISTIC PERIPHERAL IV START W/O US GUIDANCE

## 2017-02-05 PROCEDURE — 36415 COLL VENOUS BLD VENIPUNCTURE: CPT | Performed by: SURGERY

## 2017-02-05 PROCEDURE — 84132 ASSAY OF SERUM POTASSIUM: CPT | Performed by: SURGERY

## 2017-02-05 PROCEDURE — 40000802 ZZH SITE CHECK

## 2017-02-05 RX ADMIN — LORAZEPAM 0.5 MG: 2 INJECTION INTRAMUSCULAR; INTRAVENOUS at 10:26

## 2017-02-05 RX ADMIN — MEROPENEM 500 MG: 500 INJECTION, POWDER, FOR SOLUTION INTRAVENOUS at 08:33

## 2017-02-05 RX ADMIN — POTASSIUM CHLORIDE 10 MEQ: 14.9 INJECTION, SOLUTION, CONCENTRATE PARENTERAL at 23:54

## 2017-02-05 RX ADMIN — ACETAMINOPHEN 1000 MG: 325 TABLET, FILM COATED ORAL at 17:33

## 2017-02-05 RX ADMIN — LEVOTHYROXINE SODIUM 275 MCG: 175 TABLET ORAL at 08:36

## 2017-02-05 RX ADMIN — POTASSIUM CHLORIDE 10 MEQ: 7.46 INJECTION, SOLUTION INTRAVENOUS at 14:06

## 2017-02-05 RX ADMIN — MEROPENEM 500 MG: 500 INJECTION, POWDER, FOR SOLUTION INTRAVENOUS at 14:56

## 2017-02-05 RX ADMIN — OXYCODONE HYDROCHLORIDE 10 MG: 5 TABLET ORAL at 03:34

## 2017-02-05 RX ADMIN — ACETAMINOPHEN 1000 MG: 325 TABLET, FILM COATED ORAL at 08:36

## 2017-02-05 RX ADMIN — ONDANSETRON 4 MG: 2 INJECTION INTRAMUSCULAR; INTRAVENOUS at 07:51

## 2017-02-05 RX ADMIN — POTASSIUM CHLORIDE 10 MEQ: 14.9 INJECTION, SOLUTION, CONCENTRATE PARENTERAL at 23:01

## 2017-02-05 RX ADMIN — ACETAMINOPHEN 1000 MG: 325 TABLET, FILM COATED ORAL at 23:54

## 2017-02-05 RX ADMIN — OXYCODONE HYDROCHLORIDE 10 MG: 5 TABLET ORAL at 14:06

## 2017-02-05 RX ADMIN — POTASSIUM CHLORIDE 10 MEQ: 7.46 INJECTION, SOLUTION INTRAVENOUS at 16:06

## 2017-02-05 RX ADMIN — MEROPENEM 500 MG: 500 INJECTION, POWDER, FOR SOLUTION INTRAVENOUS at 20:20

## 2017-02-05 RX ADMIN — POTASSIUM CHLORIDE 10 MEQ: 7.46 INJECTION, SOLUTION INTRAVENOUS at 11:32

## 2017-02-05 RX ADMIN — POTASSIUM CHLORIDE 10 MEQ: 14.9 INJECTION, SOLUTION, CONCENTRATE PARENTERAL at 20:56

## 2017-02-05 RX ADMIN — POTASSIUM CHLORIDE 10 MEQ: 7.46 INJECTION, SOLUTION INTRAVENOUS at 12:52

## 2017-02-05 RX ADMIN — PANTOPRAZOLE SODIUM 40 MG: 40 TABLET, DELAYED RELEASE ORAL at 08:36

## 2017-02-05 RX ADMIN — POTASSIUM CHLORIDE 10 MEQ: 7.46 INJECTION, SOLUTION INTRAVENOUS at 17:34

## 2017-02-05 RX ADMIN — POTASSIUM CHLORIDE 10 MEQ: 7.46 INJECTION, SOLUTION INTRAVENOUS at 10:04

## 2017-02-05 RX ADMIN — POTASSIUM CHLORIDE 10 MEQ: 14.9 INJECTION, SOLUTION, CONCENTRATE PARENTERAL at 21:53

## 2017-02-05 RX ADMIN — OXYCODONE HYDROCHLORIDE 10 MG: 5 TABLET ORAL at 21:53

## 2017-02-05 NOTE — PLAN OF CARE
"Problem: Goal Outcome Summary  Goal: Goal Outcome Summary  Outcome: No Change  Blood pressure 103/60, pulse 98, temperature 98.6  F (37  C), temperature source Oral, resp. rate 20, height 1.651 m (5' 5\"), weight 106.505 kg (234 lb 12.8 oz), SpO2 98 %, not currently breastfeeding.  Pt afebrile, vitals stable, pain tolerable with Oxycodone. Pt on full liquids but not tolerating diet, doing clear ensure and water. Abdominal incision with staples C/D/I. Pt with loose stools. PICC line intact-IV antibiotics per orders. Pt not motivated to do any activity, seems more tired this evening. Cont to monitor.         "

## 2017-02-05 NOTE — PROGRESS NOTES
"General Surgery Progress Note    Subjective  No acute events. Tolerating full liquids without issues. +Flatus, BMs    Objective  /68 mmHg  Pulse 98  Temp(Src) 100.7  F (38.2  C) (Oral)  Resp 20  Ht 1.651 m (5' 5\")  Wt 106.505 kg (234 lb 12.8 oz)  BMI 39.07 kg/m2  SpO2 99%    On Exam  NAD, Awake, orientedx4  NLB on RA  Abdomen soft appropriately tender, incisions c/d/i with staples in place, serous drainage from midline.    I/O last 3 completed shifts:  In: 2020 [P.O.:1320; I.V.:700]  Out: 1275 [Urine:1275]    WBC     17.3   2/5/2017  RBC     3.37   2/5/2017  HGB      9.6   2/5/2017  HCT     29.9   2/5/2017  No components found with this name: mct  MCV       89   2/5/2017  MCH     28.5   2/5/2017  MCHC     32.1   2/5/2017  RDW     15.4   2/5/2017  PLT      444   2/5/2017  basic metabolic panel    Assessment/Plan:  Tammy is a 55 yo F s/p ex lap, cholecystectomy, jejunostomy takedown, jejunocolic anastomosis and ventral hernia repair on 1/25. Urine culture growing Klebsiella pneumoniae ESBL, sensitivities pending. Patient with fluid collection, likely abscess on CT on 2/2/17    -continue with IV meropenem  -advanced diet to low residue diet  -pain control with PO pain meds  -PT/OT  -PPX: lovenox, protonix    Patient seen with chief resident Dr. Stanley Leung   Surgery PGY2  [phone]    "

## 2017-02-05 NOTE — PLAN OF CARE
"Problem: Goal Outcome Summary  Goal: Goal Outcome Summary  Outcome: No Change  /84 mmHg  Pulse 86  Temp(Src) 98.1  F (36.7  C) (Oral)  Resp 18  Ht 1.651 m (5' 5\")  Wt 106.505 kg (234 lb 12.8 oz)  BMI 39.07 kg/m2  SpO2 94%  Pt afebrile, vitals stable, pain tolerable with Oxycodone, ativan given for anxiety. Pt spent most of the day in bed, states \"i just want to sleep\". Midline iintact-IV antibiotics and potassium replacements per orders. Pt still having loose stools. Midline incision with staples C/D/I. Old ileostomy takedown site covered- scabbing. Straight cath via neobladder done per home routine- adequate urine output. On low fiber diet- poor po intake. Cont to monitor.        "

## 2017-02-06 LAB
ANION GAP SERPL CALCULATED.3IONS-SCNC: 12 MMOL/L (ref 3–14)
BUN SERPL-MCNC: 7 MG/DL (ref 7–30)
CALCIUM SERPL-MCNC: 8 MG/DL (ref 8.5–10.1)
CHLORIDE SERPL-SCNC: 118 MMOL/L (ref 94–109)
CO2 SERPL-SCNC: 11 MMOL/L (ref 20–32)
CREAT SERPL-MCNC: 1.4 MG/DL (ref 0.52–1.04)
ERYTHROCYTE [DISTWIDTH] IN BLOOD BY AUTOMATED COUNT: 15.8 % (ref 10–15)
GFR SERPL CREATININE-BSD FRML MDRD: 39 ML/MIN/1.7M2
GLUCOSE SERPL-MCNC: 91 MG/DL (ref 70–99)
HCT VFR BLD AUTO: 30.9 % (ref 35–47)
HGB BLD-MCNC: 9.6 G/DL (ref 11.7–15.7)
MAGNESIUM SERPL-MCNC: 1.8 MG/DL (ref 1.6–2.3)
MCH RBC QN AUTO: 28.2 PG (ref 26.5–33)
MCHC RBC AUTO-ENTMCNC: 31.1 G/DL (ref 31.5–36.5)
MCV RBC AUTO: 91 FL (ref 78–100)
PLATELET # BLD AUTO: 497 10E9/L (ref 150–450)
POTASSIUM SERPL-SCNC: 3.2 MMOL/L (ref 3.4–5.3)
RBC # BLD AUTO: 3.41 10E12/L (ref 3.8–5.2)
SODIUM SERPL-SCNC: 141 MMOL/L (ref 133–144)
WBC # BLD AUTO: 17.1 10E9/L (ref 4–11)

## 2017-02-06 PROCEDURE — 12000008 ZZH R&B INTERMEDIATE UMMC

## 2017-02-06 PROCEDURE — 36415 COLL VENOUS BLD VENIPUNCTURE: CPT | Performed by: STUDENT IN AN ORGANIZED HEALTH CARE EDUCATION/TRAINING PROGRAM

## 2017-02-06 PROCEDURE — 40000556 ZZH STATISTIC PERIPHERAL IV START W US GUIDANCE

## 2017-02-06 PROCEDURE — 80048 BASIC METABOLIC PNL TOTAL CA: CPT | Performed by: STUDENT IN AN ORGANIZED HEALTH CARE EDUCATION/TRAINING PROGRAM

## 2017-02-06 PROCEDURE — 25000125 ZZHC RX 250: Performed by: SURGERY

## 2017-02-06 PROCEDURE — 25000132 ZZH RX MED GY IP 250 OP 250 PS 637: Performed by: SURGERY

## 2017-02-06 PROCEDURE — 85027 COMPLETE CBC AUTOMATED: CPT | Performed by: STUDENT IN AN ORGANIZED HEALTH CARE EDUCATION/TRAINING PROGRAM

## 2017-02-06 PROCEDURE — 25000128 H RX IP 250 OP 636: Performed by: STUDENT IN AN ORGANIZED HEALTH CARE EDUCATION/TRAINING PROGRAM

## 2017-02-06 PROCEDURE — 25000128 H RX IP 250 OP 636: Performed by: SURGERY

## 2017-02-06 PROCEDURE — 25000132 ZZH RX MED GY IP 250 OP 250 PS 637: Performed by: STUDENT IN AN ORGANIZED HEALTH CARE EDUCATION/TRAINING PROGRAM

## 2017-02-06 PROCEDURE — 83735 ASSAY OF MAGNESIUM: CPT | Performed by: STUDENT IN AN ORGANIZED HEALTH CARE EDUCATION/TRAINING PROGRAM

## 2017-02-06 RX ORDER — AMOXICILLIN 250 MG
1 CAPSULE ORAL AT BEDTIME
Status: DISCONTINUED | OUTPATIENT
Start: 2017-02-06 | End: 2017-02-10 | Stop reason: HOSPADM

## 2017-02-06 RX ADMIN — OXYCODONE HYDROCHLORIDE 10 MG: 5 TABLET ORAL at 13:55

## 2017-02-06 RX ADMIN — OXYCODONE HYDROCHLORIDE 10 MG: 5 TABLET ORAL at 21:02

## 2017-02-06 RX ADMIN — ACETAMINOPHEN 1000 MG: 325 TABLET, FILM COATED ORAL at 08:00

## 2017-02-06 RX ADMIN — OXYCODONE HYDROCHLORIDE 10 MG: 5 TABLET ORAL at 08:43

## 2017-02-06 RX ADMIN — PANTOPRAZOLE SODIUM 40 MG: 40 TABLET, DELAYED RELEASE ORAL at 08:00

## 2017-02-06 RX ADMIN — MEROPENEM 500 MG: 500 INJECTION, POWDER, FOR SOLUTION INTRAVENOUS at 20:05

## 2017-02-06 RX ADMIN — POTASSIUM CHLORIDE 10 MEQ: 14.9 INJECTION, SOLUTION, CONCENTRATE PARENTERAL at 23:19

## 2017-02-06 RX ADMIN — ACETAMINOPHEN 1000 MG: 325 TABLET, FILM COATED ORAL at 16:50

## 2017-02-06 RX ADMIN — MEROPENEM 500 MG: 500 INJECTION, POWDER, FOR SOLUTION INTRAVENOUS at 13:47

## 2017-02-06 RX ADMIN — POTASSIUM CHLORIDE 10 MEQ: 14.9 INJECTION, SOLUTION, CONCENTRATE PARENTERAL at 01:02

## 2017-02-06 RX ADMIN — OXYCODONE HYDROCHLORIDE 10 MG: 5 TABLET ORAL at 03:12

## 2017-02-06 RX ADMIN — LEVOTHYROXINE SODIUM 275 MCG: 175 TABLET ORAL at 08:00

## 2017-02-06 RX ADMIN — MEROPENEM 500 MG: 500 INJECTION, POWDER, FOR SOLUTION INTRAVENOUS at 02:21

## 2017-02-06 RX ADMIN — POTASSIUM CHLORIDE 10 MEQ: 14.9 INJECTION, SOLUTION, CONCENTRATE PARENTERAL at 22:07

## 2017-02-06 RX ADMIN — POTASSIUM CHLORIDE 10 MEQ: 14.9 INJECTION, SOLUTION, CONCENTRATE PARENTERAL at 02:21

## 2017-02-06 RX ADMIN — POTASSIUM CHLORIDE 40 MEQ: 750 TABLET, EXTENDED RELEASE ORAL at 13:48

## 2017-02-06 RX ADMIN — ONDANSETRON 4 MG: 2 INJECTION INTRAMUSCULAR; INTRAVENOUS at 17:21

## 2017-02-06 RX ADMIN — MEROPENEM 500 MG: 500 INJECTION, POWDER, FOR SOLUTION INTRAVENOUS at 08:04

## 2017-02-06 ASSESSMENT — PAIN DESCRIPTION - DESCRIPTORS: DESCRIPTORS: ACHING

## 2017-02-06 NOTE — PLAN OF CARE
"Problem: Goal Outcome Summary  Goal: Goal Outcome Summary  /70 mmHg  Pulse 91  Temp(Src) 99  F (37.2  C) (Oral)  Resp 18  Ht 1.651 m (5' 5\")  Wt 106.55 kg (234 lb 14.4 oz)  BMI 39.09 kg/m2  SpO2 95%  Pt afebrile, vss, oxycodone for pain, potassium recheck after 6 bags of 10 mEq replacement only 2.8, pt needs 6 more bags of potassium replacement. Pt continues with diarrhea, some small amount of blood noted on depend- pt declined adis-area assessment. Pt on a restricted fiber diet- poor PO intake. Sleeping most of the day. Pt continues to refuse Heparin Subcutaneous also refusing Pneumoboots and refusing ambulation despite teaching on importance of the listed interventions and possible adverse effects of refusing.         "

## 2017-02-06 NOTE — PROGRESS NOTES
"General Surgery Progress Note    Subjective  No acute events. Tolerating regular diet. + flatus, diarrhea.    Objective  /56 mmHg  Pulse 90  Temp(Src) 98.7  F (37.1  C) (Oral)  Resp 20  Ht 1.651 m (5' 5\")  Wt 106.55 kg (234 lb 14.4 oz)  BMI 39.09 kg/m2  SpO2 96%    On Exam  NAD, Awake, orientedx4  NLB  Abdomen soft, appropriately tender, incision c/d/i    I/O last 3 completed shifts:  In: 1800 [P.O.:800; I.V.:1000]  Out: 2100 [Urine:2100]    WBC     17.1   2/6/2017  RBC     3.41   2/6/2017  HGB      9.6   2/6/2017  HCT     30.9   2/6/2017  No components found with this name: mct  MCV       91   2/6/2017  MCH     28.2   2/6/2017  MCHC     31.1   2/6/2017  RDW     15.8   2/6/2017  PLT      497   2/6/2017  basic metabolic panel    Assessment/Plan:  Tammy is a 55 yo F s/p ex lap, cholecystectomy, jejunostomy takedown, jejunocolic anastomosis and ventral hernia repair on 1/25. Urine culture growing Klebsiella pneumoniae ESBL, sensitivities pending. Patient with fluid collection, likely abscess on CT on 2/2/17    -continue with IV meropenem  -advanced diet to low residue diet  -pain control with PO pain meds  -PT/OT  -PPX: lovenox, protonix  -Dispo: possible discharge with antibiotics if patient continues to do well clinically tomorrow.    Patient discussed with staff, Dr. Smith Leung   Surgery PGY2  [phone]    "

## 2017-02-06 NOTE — PLAN OF CARE
"Problem: Individualization  Goal: Patient Preferences  Outcome: No Change  .Blood pressure 105/67, pulse 90, temperature 98.9  F (37.2  C), temperature source Oral, resp. rate 18, height 1.651 m (5' 5\"), weight 106.55 kg (234 lb 14.4 oz), SpO2 96 %, not currently breastfeeding.     Patient receiving PO pain meds/tylenol with partial relief; no reported nausea; midline stapled incision CDI, self-cathing neobladder, having frequent soft stools; left PICC now midline -patient would like it pulled out, right peripheral in place; K recheck was 2.8 and another replacement was initiated and completed; patient up independently; eating better and appeared to sleep intermittently         "

## 2017-02-06 NOTE — PROGRESS NOTES
Care Coordinator- Discharge Planning     Admission Date/Time:  1/25/2017  Attending MD:  Alban Coats MD     Data  Date of initial CC assessment:  1/31/2017  Chart reviewed, discussed with interdisciplinary team.   Patient was admitted for:   1. Ventral hernia without obstruction or gangrene         Assessment  Full assessment completed in previous note    Coordination of Care and Referrals: Provided patient/family with options for Home Infusion.    Per MD team patient will likely need IV merrem at discharge.  Met with patient at bedside to discuss discharge planning.  Patient plans on discharging back to [facility] after she discharges from the hospital.  She plans on flying home.  Patient has done IV antibiotics at home in the past and gets her supplies and medications through [facility] in [facility].  Per the patient her contact there is Blayne Uriarte(P: [phone], F: [phone]), who is a home health pharmacist.  This writer contacted Blayne and left a voicemail to find out the process for getting the patient set up with home IV antibiotics.  Awaiting return call back.  Will continue to follow and assist with discharge planning.        Plan  Anticipated Discharge Date:  TBD  Anticipated Discharge Plan:  TBD, Home with IV antibiotics      Thais Martinez, RNCC  [phone]

## 2017-02-07 ENCOUNTER — APPOINTMENT (OUTPATIENT)
Dept: CT IMAGING | Facility: CLINIC | Age: 57
DRG: 329 | End: 2017-02-07
Attending: STUDENT IN AN ORGANIZED HEALTH CARE EDUCATION/TRAINING PROGRAM
Payer: COMMERCIAL

## 2017-02-07 LAB
ERYTHROCYTE [DISTWIDTH] IN BLOOD BY AUTOMATED COUNT: 15.6 % (ref 10–15)
HCT VFR BLD AUTO: 29.6 % (ref 35–47)
HGB BLD-MCNC: 9.5 G/DL (ref 11.7–15.7)
MCH RBC QN AUTO: 28.4 PG (ref 26.5–33)
MCHC RBC AUTO-ENTMCNC: 32.1 G/DL (ref 31.5–36.5)
MCV RBC AUTO: 88 FL (ref 78–100)
PLATELET # BLD AUTO: 548 10E9/L (ref 150–450)
POTASSIUM SERPL-SCNC: 3 MMOL/L (ref 3.4–5.3)
POTASSIUM SERPL-SCNC: 3.1 MMOL/L (ref 3.4–5.3)
POTASSIUM SERPL-SCNC: 3.1 MMOL/L (ref 3.4–5.3)
RBC # BLD AUTO: 3.35 10E12/L (ref 3.8–5.2)
WBC # BLD AUTO: 15.4 10E9/L (ref 4–11)

## 2017-02-07 PROCEDURE — 85027 COMPLETE CBC AUTOMATED: CPT | Performed by: STUDENT IN AN ORGANIZED HEALTH CARE EDUCATION/TRAINING PROGRAM

## 2017-02-07 PROCEDURE — 74177 CT ABD & PELVIS W/CONTRAST: CPT

## 2017-02-07 PROCEDURE — 25800025 ZZH RX 258: Performed by: STUDENT IN AN ORGANIZED HEALTH CARE EDUCATION/TRAINING PROGRAM

## 2017-02-07 PROCEDURE — 25000132 ZZH RX MED GY IP 250 OP 250 PS 637: Performed by: STUDENT IN AN ORGANIZED HEALTH CARE EDUCATION/TRAINING PROGRAM

## 2017-02-07 PROCEDURE — 25000125 ZZHC RX 250: Performed by: SURGERY

## 2017-02-07 PROCEDURE — 25000132 ZZH RX MED GY IP 250 OP 250 PS 637: Performed by: SURGERY

## 2017-02-07 PROCEDURE — 25000125 ZZHC RX 250: Performed by: STUDENT IN AN ORGANIZED HEALTH CARE EDUCATION/TRAINING PROGRAM

## 2017-02-07 PROCEDURE — 12000008 ZZH R&B INTERMEDIATE UMMC

## 2017-02-07 PROCEDURE — 25000128 H RX IP 250 OP 636: Performed by: SURGERY

## 2017-02-07 PROCEDURE — 36415 COLL VENOUS BLD VENIPUNCTURE: CPT | Performed by: SURGERY

## 2017-02-07 PROCEDURE — 25000128 H RX IP 250 OP 636: Performed by: STUDENT IN AN ORGANIZED HEALTH CARE EDUCATION/TRAINING PROGRAM

## 2017-02-07 PROCEDURE — 25500064 ZZH RX 255 OP 636: Performed by: STUDENT IN AN ORGANIZED HEALTH CARE EDUCATION/TRAINING PROGRAM

## 2017-02-07 PROCEDURE — 84132 ASSAY OF SERUM POTASSIUM: CPT | Performed by: SURGERY

## 2017-02-07 RX ORDER — DEXTROSE MONOHYDRATE, SODIUM CHLORIDE, AND POTASSIUM CHLORIDE 50; 1.49; 4.5 G/1000ML; G/1000ML; G/1000ML
INJECTION, SOLUTION INTRAVENOUS CONTINUOUS
Status: DISCONTINUED | OUTPATIENT
Start: 2017-02-08 | End: 2017-02-10 | Stop reason: HOSPADM

## 2017-02-07 RX ORDER — IOPAMIDOL 755 MG/ML
135 INJECTION, SOLUTION INTRAVASCULAR ONCE
Status: COMPLETED | OUTPATIENT
Start: 2017-02-07 | End: 2017-02-07

## 2017-02-07 RX ADMIN — PANTOPRAZOLE SODIUM 40 MG: 40 TABLET, DELAYED RELEASE ORAL at 08:43

## 2017-02-07 RX ADMIN — MEROPENEM 500 MG: 500 INJECTION, POWDER, FOR SOLUTION INTRAVENOUS at 01:38

## 2017-02-07 RX ADMIN — LEVOTHYROXINE SODIUM 275 MCG: 175 TABLET ORAL at 08:43

## 2017-02-07 RX ADMIN — POTASSIUM CHLORIDE, DEXTROSE MONOHYDRATE AND SODIUM CHLORIDE: 150; 5; 450 INJECTION, SOLUTION INTRAVENOUS at 23:33

## 2017-02-07 RX ADMIN — POTASSIUM CHLORIDE 20 MEQ: 750 TABLET, EXTENDED RELEASE ORAL at 18:38

## 2017-02-07 RX ADMIN — POTASSIUM CHLORIDE 10 MEQ: 14.9 INJECTION, SOLUTION, CONCENTRATE PARENTERAL at 07:02

## 2017-02-07 RX ADMIN — MEROPENEM 500 MG: 500 INJECTION, POWDER, FOR SOLUTION INTRAVENOUS at 14:12

## 2017-02-07 RX ADMIN — OXYCODONE HYDROCHLORIDE 10 MG: 5 TABLET ORAL at 14:19

## 2017-02-07 RX ADMIN — MEROPENEM 500 MG: 500 INJECTION, POWDER, FOR SOLUTION INTRAVENOUS at 08:41

## 2017-02-07 RX ADMIN — ONDANSETRON 4 MG: 2 INJECTION INTRAMUSCULAR; INTRAVENOUS at 04:38

## 2017-02-07 RX ADMIN — OXYCODONE HYDROCHLORIDE 10 MG: 5 TABLET ORAL at 08:48

## 2017-02-07 RX ADMIN — ACETAMINOPHEN 1000 MG: 325 TABLET, FILM COATED ORAL at 01:38

## 2017-02-07 RX ADMIN — ACETAMINOPHEN 1000 MG: 325 TABLET, FILM COATED ORAL at 08:43

## 2017-02-07 RX ADMIN — IOPAMIDOL 135 ML: 755 INJECTION, SOLUTION INTRAVENOUS at 14:35

## 2017-02-07 RX ADMIN — HYDROMORPHONE HYDROCHLORIDE 0.2 MG: 10 INJECTION, SOLUTION INTRAMUSCULAR; INTRAVENOUS; SUBCUTANEOUS at 07:02

## 2017-02-07 RX ADMIN — MEROPENEM 500 MG: 500 INJECTION, POWDER, FOR SOLUTION INTRAVENOUS at 21:00

## 2017-02-07 RX ADMIN — POTASSIUM CHLORIDE 10 MEQ: 14.9 INJECTION, SOLUTION, CONCENTRATE PARENTERAL at 10:13

## 2017-02-07 RX ADMIN — POTASSIUM CHLORIDE 10 MEQ: 14.9 INJECTION, SOLUTION, CONCENTRATE PARENTERAL at 04:05

## 2017-02-07 RX ADMIN — ACETAMINOPHEN 1000 MG: 325 TABLET, FILM COATED ORAL at 16:56

## 2017-02-07 RX ADMIN — ONDANSETRON 4 MG: 2 INJECTION INTRAMUSCULAR; INTRAVENOUS at 15:30

## 2017-02-07 RX ADMIN — OXYCODONE HYDROCHLORIDE 10 MG: 5 TABLET ORAL at 04:24

## 2017-02-07 RX ADMIN — OXYCODONE HYDROCHLORIDE 10 MG: 5 TABLET ORAL at 21:18

## 2017-02-07 RX ADMIN — POTASSIUM CHLORIDE 40 MEQ: 750 TABLET, EXTENDED RELEASE ORAL at 16:56

## 2017-02-07 RX ADMIN — POTASSIUM CHLORIDE 10 MEQ: 14.9 INJECTION, SOLUTION, CONCENTRATE PARENTERAL at 05:59

## 2017-02-07 RX ADMIN — SODIUM CHLORIDE, PRESERVATIVE FREE 84 ML: 5 INJECTION INTRAVENOUS at 14:35

## 2017-02-07 RX ADMIN — OXYCODONE HYDROCHLORIDE 10 MG: 5 TABLET ORAL at 17:18

## 2017-02-07 ASSESSMENT — PAIN DESCRIPTION - DESCRIPTORS: DESCRIPTORS: ACHING

## 2017-02-07 NOTE — PLAN OF CARE
Filed Vitals:    02/06/17 0700 02/06/17 1638 02/06/17 1906 02/06/17 2159   BP: 100/56 95/60  105/82   Pulse:       Temp: 98.7  F (37.1  C) 98.3  F (36.8  C)  98.4  F (36.9  C)   TempSrc: Oral Oral  Oral   Resp: 20 20  20   Height:       Weight:   106.55 kg (234 lb 14.4 oz)    SpO2: 96% 95%  98%     Afebrile, 98% RA, other VSS. Denies SOB. IV zofran x1 for nausea, effective. Oxycodone and dilaudid for pain, effective. Midline incision with staples, CDI, RUSTY. Potassium 3.1, replacement in process. IV abx administered. Neobladder, self-cathing adequate urine output. Loose BM x1. Appeared to be resting between cares. Continue plan of care.

## 2017-02-07 NOTE — PLAN OF CARE
Problem: Goal Outcome Summary  Goal: Goal Outcome Summary  Outcome: No Change  Filed Vitals:     02/06/17 0700 02/06/17 1638 02/06/17 1906 02/06/17 2159   BP: 100/56 95/60   105/82   Pulse:           Temp: 98.7  F (37.1  C) 98.3  F (36.8  C)   98.4  F (36.9  C)   TempSrc: Oral Oral   Oral   Resp: 20 20   20   Height:           Weight:     106.55 kg (234 lb 14.4 oz)     SpO2: 96% 95%   98%     Pt's pain controlled with prn oxycodone x1.  Midline incision with staples and RUSTY. Potassium 3.2, in process of replacement. IV antibiotics infusing per orders. Adequate urine output. 2 loose BMs.Tolerating low fiber diet, poor oral intake. Up independently. Continue with plan of care.

## 2017-02-07 NOTE — PLAN OF CARE
"Problem: Individualization  Goal: Patient Preferences  Outcome: No Change  BP 91/57 mmHg  Pulse 90  Temp(Src) 97  F (36.1  C) (Oral)  Resp 18  Ht 1.651 m (5' 5\")  Wt 106.55 kg (234 lb 14.4 oz)  BMI 39.09 kg/m2  SpO2 95%  Pt has been avss, pain under control with the oral agent. She straight cath at 1000 and at 1345, declined irrigation, said with the next catherization.The plan is to continue with Meropenem, low fiber diet, monitor the electrolytes, abd CT ( pt awaiting transport, drank oral contrast). The potasium was 3.1 and it was replaced, result is pending. Will continue to monitor.        "

## 2017-02-07 NOTE — PROGRESS NOTES
[facility], [place]   Antimicrobial Management Team (AMT) Note              To: Surgery  Unit: 7B   No Known Allergies    Brief Summary: Tammy Borges is a 56 year old female with a history of ESBL and CRE infections, CKD, bladder cancer s/p radical cystectomy and Indiana pouch creation, and complex surgical history who underwent an exploratory laparotomy on 1/25. She is now being treated for a CRE UTI.    HPI: Patient had an exploratory laparotomy on 1/25, and did well post-op. Fierro catheter was removed on 1/28 and patient straight cath x4/day (pt self-cath PTA). Patient was found to have leukocytosis on 1/31. UA/UC was obtained on 2/2, and patient was started on meropenem.    Assessment:   1. UTI: Patient has leukocytosis, 2/2 UA dirty, 2/2 UCx with: >100,000 colonies/mL E. coli ESBL, susceptible to pip/tazo ampicillin/sulbactam, ceftazidime, ertapenem; 50-100k Klebsiella with carbapenemase production, sensitive only to TMP/SMX and tigecycline. Hx of ESBL and CRE, with 1/24 culture with the same strain of ESBL E. coli. She has persistent leukocytosis on meropenem, but has been afebrile. Patient is also noted to have fluid collection on 2/2 CT, concerning for abscess.     E. coli and Klebsiella in patient's urine culture does not necessarily represent an actual infection, but could be colonization given patient's history and anatomy. Patient has been clinically stable on meropenem despite its not covering the CRE Klebsiella. The abdominal fluid collection could also be a source of infection that can be explored.    Recommendations:  1. Meropenem can be continued for a course of 7-day for the ESBL E. coli. Ertapenem can be considered as an alternative given that the ESBL E. coli is sensitive to ertapenem.  2. If patient decompensates, or if the primary team wants to treat the abdominal abscess or the carbapenemase-producing Klebsiella in the urine, would recommend obtaining an ID consult.  "(If antibiotic therapy is considered, the combination of TMP/SMX and tigecycline in addition to meropenem is a good option).    Discussed with ID Staff - Dr. Lan Barrios, PharmD  PGY-1 Pharmacy Resident      Current Antibiotics    meropenem  500 mg Intravenous Q6H     Vitals and other clinical features  BP 91/57 mmHg  Pulse 90  Temp(Src) 97  F (36.1  C) (Oral)  Resp 18  Ht 1.651 m (5' 5\")  Wt 106.55 kg (234 lb 14.4 oz)  BMI 39.09 kg/m2  SpO2 95%    Temperature curve:      Labs  Recent Labs   Lab Test  02/07/17   0657  02/06/17   0854  02/05/17   0741  02/03/17   0659  02/02/17   0900  02/01/17   0725   01/28/17   0906   WBC  15.4*  17.1*  17.3*  15.9*  16.5*  16.0*   < >  4.0   ANEU   --    --    --    --    --    --    --   1.9   ALYM   --    --    --    --    --    --    --   0.8   GOLDIE   --    --    --    --    --    --    --   0.7   AEOS   --    --    --    --    --    --    --   0.4   HGB  9.5*  9.6*  9.6*  9.9*  10.2*  9.7*   < >  10.1*   HCT  29.6*  30.9*  29.9*  30.6*  32.1*  30.6*   < >  33.3*   MCV  88  91  89  88  90  92   < >  95   PLT  548*  497*  444  378  328  285   < >  218    < > = values in this interval not displayed.       Estimated Creatinine Clearance: 55.7 mL/min (based on Cr of 1.4).  Recent Labs   Lab Test  02/06/17   0854  02/03/17   0659  02/02/17   1524  01/29/17   0927  01/28/17   0906  01/27/17   0630   CR  1.40*  1.33*  1.44*  1.56*  1.49*  1.88*       No lab results found.    Recent Labs   Lab Test  02/04/17   0852  01/31/17   0919  01/25/17   1142  01/25/17   0933  01/25/17   0836   LACT  0.6*  0.7  3.0*  2.8*  3.1*       Culture results with specimen source  CULTURE MICRO   Date Value Ref Range Status   02/02/2017 *  Final    >100,000 colonies/mL Escherichia coli ESBL ESBL (extended beta lactamase)   producing organisms require contact precautions.  50,000 to 100,000 colonies/mL Klebsiella pneumoniae This isolate demonstrates   carbapenemase production. Contact " Precautions Required. Multi-Drug Resistant   Organism Consider Contact Precautions  Critical Value/Significant Value called to and read back by PIOTR DELAROSA RN   (7B). 02.05.17 2131 [facility]  La lyles RN 7B notified of carbapenemase producing Klebsiella   2/7/17 0825 dg     01/25/2017 Culture negative after 1 week  Final   01/25/2017 No growth  Final   01/25/2017   Final    Culture received and in progress.  Positive AFB results are called as soon as   detected.  Final report to follow in 7 to 8 weeks.  Assayed at [facility] [address]     01/25/2017   Final    Canceled, Test credited  Duplicate request  See assn [id number]     01/25/2017 No anaerobes isolated  Final    SPECIMEN DESCRIPTION   Date Value Ref Range Status   02/02/2017 Catheterized Urine  Final   01/31/2017 Feces  Final   01/25/2017 Abdominal Abscess Tissue SPECIMAN 1  Final   01/25/2017 Abdominal Abscess Tissue SPECIMEN 1  Final   01/25/2017 Abdominal Abscess Tissue SPECIMEN 1  Final   01/25/2017 Abdominal Abscess Tissue SPECIMEN 1  Final   01/25/2017 Abdominal Abscess Tissue SPECIMAN 1  Final          Recent Labs  Lab 02/02/17  1755 01/31/17  1422   CULT >100,000 colonies/mL Escherichia coli ESBL ESBL (extended beta lactamase) producing organisms require contact precautions.50,000 to 100,000 colonies/mL Klebsiella pneumoniae This isolate demonstrates carbapenemase production. Contact Precautions Required. Multi-Drug Resistant Organism Consider Contact PrecautionsCritical Value/Significant Value called to and read back by PIOTR DELAROSA RN (7B). 02.05.17 2131 Felipe lyles RN 7B notified of carbapenemase producing Klebsiella 2/7/17 0825 dg*  --    SDES Catheterized Urine Feces       Urine Studies     Recent Labs   Lab Test  02/02/17   1755  01/24/17   1159   URINEPH  6.5  6.0   NITRITE  Positive*  Negative   LEUKEST  Large*  Large*   WBCU  >182*  >182*     Last check of C difficile  C DIFF TOXIN B PCR   Date  Value Ref Range Status   01/31/2017  NEG Final    Negative  Negative: Clostridium difficile target DNA sequences NOT detected, presumed   negative for Clostridium difficile toxin B or the number of bacteria present   may be below the limit of detection for the test.   FDA approved assay performed using Index GeneXpert real-time PCR.   A negative result does not exclude actual disease due to Clostridium difficile   and may be due to improper collection, handling and storage of the specimen or   the number of organisms in the specimen is below the detection limit of the   assay.         Imaging:  Results for orders placed or performed during the hospital encounter of 01/25/17   XR Abdomen Port 1 View    Narrative    XR ABDOMEN PORT F1 VW  1/26/2017 12:20 PM      HISTORY: NGT placement    COMPARISON: CT abdomen 1/23/2017    FINDINGS: Portable view of the abdomen supine. Nasogastric tube in  place in the stomach. There is a large amount of air in the stomach.  Nonobstructive bowel gas pattern residual barium contrast in the  transverse and descending colon. Multiple surgical clips in the  abdomen and pelvis. No pneumatosis, free air, portal venous gas. The  visualized osseous and soft tissue structures are unremarkable.      Impression    IMPRESSION: NG tube in good position.    I have personally reviewed the examination and initial interpretation  and I agree with the findings.    SAMANTHA AVILEZ MD   XR Chest Port 1 View    Narrative    Exam:  XR CHEST PORT 1 VW, 1/26/2017 4:46 PM    History: check PICC line placement    Comparison:  Abdominal radiograph, same day and outside CT, 7/23/2015    Findings:  There is a left PICC with tip over the brachiocephalic  vein. An enteric tube with tip below the field of view is similar to  previous. The cardiac silhouette and pulmonary vessels are within  normal limits. Low lung volumes. There are bibasilar opacities, right  slightly greater than left. No pleural effusion or  pneumothorax.      Impression    Impression :   1. Left PICC with tip over the left brachiocephalic vein.  2. Low lung volumes with bibasilar atelectasis versus pneumonia    I have personally reviewed the examination and initial interpretation  and I agree with the findings.    GINA ROSENBAUM MD   CT Abdomen Pelvis w Contrast    Narrative    EXAMINATION: CT ABDOMEN PELVIS W CONTRAST, 2/2/2017 6:34 PM    TECHNIQUE:  Helical CT images from the lung bases through the  symphysis pubis were obtained  with IV contrast. Contrast dose:  iopamidol (ISOVUE-370) solution 135 mL    COMPARISON: Outside pelvic CT 1/23/2017. Outside abdominal CT  7/23/2015. Reason fluoroscopic studies for correlation as well.    HISTORY: Persistent elevated WBC s/p takedown jejunostomy with  jejunocolic anastomosis 1/30/17    FINDINGS:  Postsurgical changes of side to side jejunocolic anastomosis, with the  point of anastomosis over the right upper quadrant of the abdomen  involving the mid to proximal transverse colon. Fat stranding is seen  in this region, as well as over the ventral abdominal incision. Just  below the transverse colon, on series 5 image 168 is a partially  rim-enhancing fluid collection, measuring 6.1 x 3.1 cm, which is  within the region of mesenteric inflammation. Small pockets of fluid  interposed within the mesentery as well as at the inferior edge of the  right lobe of the liver.    Mild diffuse dilatation of the remaining colon Which is fluid-filled  distally. Dilatation of the small bowel, measuring up to 5 cm in some  regions.    Postsurgical changes of prior recent cholecystectomy performed at the  same time as the jejunocolic anastomosis. Surgical clips in the  gallbladder fossa. Within the gallbladder fossa is a homogeneous fluid  collection measuring 4.9 x 5.2 x 4.2 cm. No appreciable rim  enhancement. No adjacent inflammatory changes of the liver parenchyma.    The common bile duct is normal in caliber.  Normal pancreas. Spleen and  adrenal glands are unremarkable. Marked atrophy of the right kidney.  Lobular appearance of the left kidney with multiple regions of  cortical scarring. No hydronephrosis. No focal renal mass. Tiny foci  of air identified within both renal pelves likely related to open  communication to the outside via Indiana pouch. Indiana pouch  visualized in the right pelvis exiting at the right abdomen.  Postsurgical changes of cystectomy and hysterectomy. The left and  anterior to the Indiana pouch, posterior to the rectus abdominis, is a  thick-walled fluid collection seen on series 5 image 451 Jeff  measures approximately 3.4 x 1.8 cm. On prior cystogram of 1/23/2017,  this area did opacify with contrast. This collection has been present  since 2014.    Over the ventral abdominal wall near midline incision is a simple  appearing fluid collection, measuring 15 cm in height, likely  postoperative seroma. Tiny focus of air at the peritoneal-anterior  body wall interface, for example on sagittal image series 4 image 79.    Opacities in the lower lungs, likely atelectasis. The visualized  airways are patent. No pleural effusion.    Degenerative change of the lower lumbar spine. Otherwise no suspicious  lesions. Major vascular structures of the abdomen are unremarkable.    Retroperitoneal lymphadenopathy, for example a 1.9 x 1.6 cm lymph node  seen on sagittal image series 4 image 70.      Impression    IMPRESSION:   1. Postsurgical changes from side to side jejunocolonic anastomosis  with rim-enhancing fluid collection and surrounding inflammatory  change near the anastomosis, concerning for infection and possible  developing abscess.  2. Well-defined simple fluid attenuating collection at the gallbladder  fossa possibly seroma versus biloma..  3. Inflammatory change about the ventral abdominal wall incision with  simple appearing subcutaneous fluid, likely postsurgical seroma.  4. Post surgical  changes of cystectomy and Indiana pouch urinary  diversion. Unchanged size of chronic collection anterior and lateral  to the Indiana pouch the retropubic region.  5. Diffuse dilatation of the small and large bowel likely adynamic  ileus.  6. Retroperitoneal lymphadenopathy, may be related to infection or  surgery.    I have personally reviewed the examination and initial interpretation  and I agree with the findings.    CHRISTOPHER HARDIN MD   XR Chest Port 1 View    Narrative    Exam:  XR CHEST PORT 1 VW, 2/3/2017 10:03 AM    History: check PICC line palcement    Comparison:  Chest radiograph from 1/26/2017.    Findings:  Single AP view of the chest is obtained. Interval enteric  tube removal. Left PICC tip projects over the left innominate  vein/innominate confluence. Cardiomediastinal silhouette is within  normal limits. The trachea is midline. Low lung volumes. Persistent  right greater than left opacities representing atelectasis versus  consolidation. The visualized upper abdomen is unremarkable.      Impression    Impression:    1. Left PICC tip projects over the left innominate vein/innominate  confluence.  2. Increased right greater than left bibasilar opacities representing  atelectasis versus consolidation. Low lung volumes.    I have personally reviewed the examination and initial interpretation  and I agree with the findings.    JESSICA LAMB MD

## 2017-02-07 NOTE — PLAN OF CARE
Problem: Goal Outcome Summary  Goal: Goal Outcome Summary  PT-7B- Cancel, pt declined PT, pt reports she just had emesis x 1.

## 2017-02-08 ENCOUNTER — APPOINTMENT (OUTPATIENT)
Dept: INTERVENTIONAL RADIOLOGY/VASCULAR | Facility: CLINIC | Age: 57
DRG: 329 | End: 2017-02-08
Attending: SURGERY
Payer: COMMERCIAL

## 2017-02-08 LAB
ALBUMIN SERPL-MCNC: 1.7 G/DL (ref 3.4–5)
ALP SERPL-CCNC: 72 U/L (ref 40–150)
ALT SERPL W P-5'-P-CCNC: 15 U/L (ref 0–50)
AMYLASE FLD-CCNC: 17 U/L
ANION GAP SERPL CALCULATED.3IONS-SCNC: 12 MMOL/L (ref 3–14)
APPEARANCE FLD: NORMAL
AST SERPL W P-5'-P-CCNC: 7 U/L (ref 0–45)
BACTERIA SPEC CULT: NORMAL
BILIRUB FLD-MCNC: 1.9 MG/DL
BILIRUB SERPL-MCNC: 0.6 MG/DL (ref 0.2–1.3)
BUN SERPL-MCNC: 7 MG/DL (ref 7–30)
CALCIUM SERPL-MCNC: 8.2 MG/DL (ref 8.5–10.1)
CHLORIDE SERPL-SCNC: 118 MMOL/L (ref 94–109)
CO2 SERPL-SCNC: 12 MMOL/L (ref 20–32)
COLOR FLD: YELLOW
CREAT SERPL-MCNC: 1.4 MG/DL (ref 0.52–1.04)
ERYTHROCYTE [DISTWIDTH] IN BLOOD BY AUTOMATED COUNT: 15.7 % (ref 10–15)
GFR SERPL CREATININE-BSD FRML MDRD: 39 ML/MIN/1.7M2
GLUCOSE SERPL-MCNC: 88 MG/DL (ref 70–99)
HCT VFR BLD AUTO: 30.6 % (ref 35–47)
HGB BLD-MCNC: 9.8 G/DL (ref 11.7–15.7)
LACTATE BLD-SCNC: 0.6 MMOL/L (ref 0.7–2.1)
MAGNESIUM SERPL-MCNC: 1.8 MG/DL (ref 1.6–2.3)
MCH RBC QN AUTO: 28.2 PG (ref 26.5–33)
MCHC RBC AUTO-ENTMCNC: 32 G/DL (ref 31.5–36.5)
MCV RBC AUTO: 88 FL (ref 78–100)
MICRO REPORT STATUS: NORMAL
PLATELET # BLD AUTO: 591 10E9/L (ref 150–450)
POTASSIUM SERPL-SCNC: 3.2 MMOL/L (ref 3.4–5.3)
POTASSIUM SERPL-SCNC: 3.5 MMOL/L (ref 3.4–5.3)
PROT SERPL-MCNC: 6.4 G/DL (ref 6.8–8.8)
RBC # BLD AUTO: 3.48 10E12/L (ref 3.8–5.2)
RBC # FLD: NORMAL /UL
SODIUM SERPL-SCNC: 142 MMOL/L (ref 133–144)
SPECIMEN SOURCE FLD: NORMAL
SPECIMEN SOURCE: NORMAL
WBC # BLD AUTO: 15.6 10E9/L (ref 4–11)
WBC # FLD AUTO: NORMAL /UL

## 2017-02-08 PROCEDURE — 89051 BODY FLUID CELL COUNT: CPT | Performed by: STUDENT IN AN ORGANIZED HEALTH CARE EDUCATION/TRAINING PROGRAM

## 2017-02-08 PROCEDURE — 87181 SC STD AGAR DILUTION PER AGT: CPT | Performed by: STUDENT IN AN ORGANIZED HEALTH CARE EDUCATION/TRAINING PROGRAM

## 2017-02-08 PROCEDURE — 87070 CULTURE OTHR SPECIMN AEROBIC: CPT | Performed by: STUDENT IN AN ORGANIZED HEALTH CARE EDUCATION/TRAINING PROGRAM

## 2017-02-08 PROCEDURE — 99153 MOD SED SAME PHYS/QHP EA: CPT

## 2017-02-08 PROCEDURE — 25000132 ZZH RX MED GY IP 250 OP 250 PS 637: Performed by: SURGERY

## 2017-02-08 PROCEDURE — 82150 ASSAY OF AMYLASE: CPT | Performed by: STUDENT IN AN ORGANIZED HEALTH CARE EDUCATION/TRAINING PROGRAM

## 2017-02-08 PROCEDURE — 0W9G30Z DRAINAGE OF PERITONEAL CAVITY WITH DRAINAGE DEVICE, PERCUTANEOUS APPROACH: ICD-10-PCS | Performed by: RADIOLOGY

## 2017-02-08 PROCEDURE — 25000125 ZZHC RX 250: Performed by: RADIOLOGY

## 2017-02-08 PROCEDURE — C1769 GUIDE WIRE: HCPCS

## 2017-02-08 PROCEDURE — 25000132 ZZH RX MED GY IP 250 OP 250 PS 637: Performed by: STUDENT IN AN ORGANIZED HEALTH CARE EDUCATION/TRAINING PROGRAM

## 2017-02-08 PROCEDURE — 36415 COLL VENOUS BLD VENIPUNCTURE: CPT | Performed by: STUDENT IN AN ORGANIZED HEALTH CARE EDUCATION/TRAINING PROGRAM

## 2017-02-08 PROCEDURE — 27210995 ZZH RX 272: Performed by: RADIOLOGY

## 2017-02-08 PROCEDURE — 49406 IMAGE CATH FLUID PERI/RETRO: CPT

## 2017-02-08 PROCEDURE — 27211039 ZZH NEEDLE CR2

## 2017-02-08 PROCEDURE — 85027 COMPLETE CBC AUTOMATED: CPT | Performed by: STUDENT IN AN ORGANIZED HEALTH CARE EDUCATION/TRAINING PROGRAM

## 2017-02-08 PROCEDURE — 27210903 ZZH KIT CR5

## 2017-02-08 PROCEDURE — 25000128 H RX IP 250 OP 636: Performed by: SURGERY

## 2017-02-08 PROCEDURE — 83605 ASSAY OF LACTIC ACID: CPT | Performed by: SURGERY

## 2017-02-08 PROCEDURE — 82247 BILIRUBIN TOTAL: CPT | Performed by: STUDENT IN AN ORGANIZED HEALTH CARE EDUCATION/TRAINING PROGRAM

## 2017-02-08 PROCEDURE — 25000125 ZZHC RX 250: Performed by: STUDENT IN AN ORGANIZED HEALTH CARE EDUCATION/TRAINING PROGRAM

## 2017-02-08 PROCEDURE — 83735 ASSAY OF MAGNESIUM: CPT | Performed by: STUDENT IN AN ORGANIZED HEALTH CARE EDUCATION/TRAINING PROGRAM

## 2017-02-08 PROCEDURE — 80053 COMPREHEN METABOLIC PANEL: CPT | Performed by: STUDENT IN AN ORGANIZED HEALTH CARE EDUCATION/TRAINING PROGRAM

## 2017-02-08 PROCEDURE — 87075 CULTR BACTERIA EXCEPT BLOOD: CPT | Performed by: STUDENT IN AN ORGANIZED HEALTH CARE EDUCATION/TRAINING PROGRAM

## 2017-02-08 PROCEDURE — 87077 CULTURE AEROBIC IDENTIFY: CPT | Performed by: STUDENT IN AN ORGANIZED HEALTH CARE EDUCATION/TRAINING PROGRAM

## 2017-02-08 PROCEDURE — 87186 SC STD MICRODIL/AGAR DIL: CPT | Performed by: STUDENT IN AN ORGANIZED HEALTH CARE EDUCATION/TRAINING PROGRAM

## 2017-02-08 PROCEDURE — 12000008 ZZH R&B INTERMEDIATE UMMC

## 2017-02-08 PROCEDURE — 25000128 H RX IP 250 OP 636: Performed by: STUDENT IN AN ORGANIZED HEALTH CARE EDUCATION/TRAINING PROGRAM

## 2017-02-08 PROCEDURE — 36415 COLL VENOUS BLD VENIPUNCTURE: CPT | Performed by: SURGERY

## 2017-02-08 PROCEDURE — 25000128 H RX IP 250 OP 636: Performed by: RADIOLOGY

## 2017-02-08 PROCEDURE — C1729 CATH, DRAINAGE: HCPCS

## 2017-02-08 PROCEDURE — 27210732 ZZH ACCESSORY CR1

## 2017-02-08 PROCEDURE — 25000125 ZZHC RX 250: Performed by: SURGERY

## 2017-02-08 RX ORDER — FLUMAZENIL 0.1 MG/ML
0.2 INJECTION, SOLUTION INTRAVENOUS
Status: DISCONTINUED | OUTPATIENT
Start: 2017-02-08 | End: 2017-02-08 | Stop reason: HOSPADM

## 2017-02-08 RX ORDER — MAGNESIUM SULFATE HEPTAHYDRATE 40 MG/ML
4 INJECTION, SOLUTION INTRAVENOUS ONCE
Status: COMPLETED | OUTPATIENT
Start: 2017-02-08 | End: 2017-02-08

## 2017-02-08 RX ORDER — NALOXONE HYDROCHLORIDE 0.4 MG/ML
.1-.4 INJECTION, SOLUTION INTRAMUSCULAR; INTRAVENOUS; SUBCUTANEOUS
Status: DISCONTINUED | OUTPATIENT
Start: 2017-02-08 | End: 2017-02-08 | Stop reason: HOSPADM

## 2017-02-08 RX ORDER — AMPICILLIN AND SULBACTAM 2; 1 G/1; G/1
3 INJECTION, POWDER, FOR SOLUTION INTRAMUSCULAR; INTRAVENOUS
Status: COMPLETED | OUTPATIENT
Start: 2017-02-08 | End: 2017-02-08

## 2017-02-08 RX ORDER — POTASSIUM CHLORIDE 750 MG/1
20 TABLET, EXTENDED RELEASE ORAL 2 TIMES DAILY
Status: DISCONTINUED | OUTPATIENT
Start: 2017-02-08 | End: 2017-02-10 | Stop reason: HOSPADM

## 2017-02-08 RX ORDER — LIDOCAINE 40 MG/G
CREAM TOPICAL
Status: DISCONTINUED | OUTPATIENT
Start: 2017-02-08 | End: 2017-02-10 | Stop reason: HOSPADM

## 2017-02-08 RX ORDER — FENTANYL CITRATE 50 UG/ML
25-50 INJECTION, SOLUTION INTRAMUSCULAR; INTRAVENOUS EVERY 5 MIN PRN
Status: DISCONTINUED | OUTPATIENT
Start: 2017-02-08 | End: 2017-02-08 | Stop reason: HOSPADM

## 2017-02-08 RX ORDER — HEPARIN SODIUM,PORCINE 10 UNIT/ML
2-5 VIAL (ML) INTRAVENOUS
Status: DISCONTINUED | OUTPATIENT
Start: 2017-02-08 | End: 2017-02-10 | Stop reason: HOSPADM

## 2017-02-08 RX ADMIN — POTASSIUM CHLORIDE 20 MEQ: 750 TABLET, EXTENDED RELEASE ORAL at 20:54

## 2017-02-08 RX ADMIN — HYDROMORPHONE HYDROCHLORIDE 0.2 MG: 10 INJECTION, SOLUTION INTRAMUSCULAR; INTRAVENOUS; SUBCUTANEOUS at 16:08

## 2017-02-08 RX ADMIN — OXYCODONE HYDROCHLORIDE 10 MG: 5 TABLET ORAL at 03:59

## 2017-02-08 RX ADMIN — FENTANYL CITRATE 50 MCG: 50 INJECTION, SOLUTION INTRAMUSCULAR; INTRAVENOUS at 15:38

## 2017-02-08 RX ADMIN — POTASSIUM CHLORIDE 10 MEQ: 14.9 INJECTION, SOLUTION, CONCENTRATE PARENTERAL at 03:34

## 2017-02-08 RX ADMIN — AMPICILLIN AND SULBACTAM 3 G: 1; 2 INJECTION, POWDER, FOR SOLUTION INTRAMUSCULAR; INTRAVENOUS at 14:55

## 2017-02-08 RX ADMIN — POTASSIUM CHLORIDE 10 MEQ: 14.9 INJECTION, SOLUTION, CONCENTRATE PARENTERAL at 00:49

## 2017-02-08 RX ADMIN — OXYCODONE HYDROCHLORIDE 10 MG: 5 TABLET ORAL at 07:03

## 2017-02-08 RX ADMIN — ACETAMINOPHEN 1000 MG: 325 TABLET, FILM COATED ORAL at 15:57

## 2017-02-08 RX ADMIN — POTASSIUM CHLORIDE 10 MEQ: 14.9 INJECTION, SOLUTION, CONCENTRATE PARENTERAL at 02:24

## 2017-02-08 RX ADMIN — ACETAMINOPHEN 1000 MG: 325 TABLET, FILM COATED ORAL at 23:07

## 2017-02-08 RX ADMIN — ACETAMINOPHEN 1000 MG: 325 TABLET, FILM COATED ORAL at 08:55

## 2017-02-08 RX ADMIN — MEROPENEM 500 MG: 500 INJECTION, POWDER, FOR SOLUTION INTRAVENOUS at 20:54

## 2017-02-08 RX ADMIN — MIDAZOLAM 1 MG: 1 INJECTION INTRAMUSCULAR; INTRAVENOUS at 15:38

## 2017-02-08 RX ADMIN — MEROPENEM 500 MG: 500 INJECTION, POWDER, FOR SOLUTION INTRAVENOUS at 01:53

## 2017-02-08 RX ADMIN — OXYCODONE HYDROCHLORIDE 10 MG: 5 TABLET ORAL at 10:59

## 2017-02-08 RX ADMIN — MEROPENEM 500 MG: 500 INJECTION, POWDER, FOR SOLUTION INTRAVENOUS at 08:55

## 2017-02-08 RX ADMIN — Medication 2 G: at 14:05

## 2017-02-08 RX ADMIN — MEROPENEM 500 MG: 500 INJECTION, POWDER, FOR SOLUTION INTRAVENOUS at 15:56

## 2017-02-08 RX ADMIN — POTASSIUM CHLORIDE 10 MEQ: 14.9 INJECTION, SOLUTION, CONCENTRATE PARENTERAL at 04:41

## 2017-02-08 RX ADMIN — LIDOCAINE HYDROCHLORIDE 15 ML: 10 INJECTION, SOLUTION EPIDURAL; INFILTRATION; INTRACAUDAL; PERINEURAL at 15:37

## 2017-02-08 RX ADMIN — MAGNESIUM SULFATE IN WATER 4 G: 40 INJECTION, SOLUTION INTRAVENOUS at 11:59

## 2017-02-08 RX ADMIN — LEVOTHYROXINE SODIUM 275 MCG: 175 TABLET ORAL at 08:55

## 2017-02-08 RX ADMIN — OXYCODONE HYDROCHLORIDE 10 MG: 5 TABLET ORAL at 20:54

## 2017-02-08 RX ADMIN — OXYCODONE HYDROCHLORIDE 10 MG: 5 TABLET ORAL at 14:10

## 2017-02-08 RX ADMIN — OXYCODONE HYDROCHLORIDE 10 MG: 5 TABLET ORAL at 17:41

## 2017-02-08 NOTE — BRIEF OP NOTE
Interventional Radiology Brief Post Procedure Note    Procedure: @FVRISFRMTLINK([id number])@    Proceduralist: Carl Erwin MD    Assistant: Mohan Spence MD    Time Out: Prior to the start of the procedure and with procedural staff participation, I verbally confirmed the patient s identity using two indicators, relevant allergies, that the procedure was appropriate and matched the consent or emergent situation, and that the correct equipment/implants were available. Immediately prior to starting the procedure I conducted the Time Out with the procedural staff and re-confirmed the patient s name, procedure, and site/side. (The Joint Commission universal protocol was followed.)  Yes    Sedation: IR Nurse Monitored Care   Post Procedure Summary:  Prior to the start of the procedure and with procedural staff participation, I verbally confirmed the patient s identity using two indicators, relevant allergies, that the procedure was appropriate and matched the consent or emergent situation, and that the correct equipment/implants were available. Immediately prior to starting the procedure I conducted the Time Out with the procedural staff and re-confirmed the patient s name, procedure, and site/side. (The Joint Commission universal protocol was followed.)  Yes       Sedatives: Fentanyl and Midazolam (Versed)    Vital signs, airway and pulse oximetry were monitored and remained stable throughout the procedure and sedation was maintained until the procedure was complete.  The patient was monitored by staff until sedation discharge criteria were met.    Patient tolerance: Patient tolerated the procedure well with no immediate complications.    Time of sedation in minutes: 30 Minutes minutes from beginning to end of physician one to one monitoring.        Findings: subhepatic abscess.  ~100 cc puss aspirated.  12 Fr locking pigtail left.    Estimated Blood Loss: Minimal    Fluoroscopy Time: Not  applicable    SPECIMENS: Fluid and/or tissue for laboratory analysis    Complications: 1. None     Condition: Stable    Plan: back to floor    Comments: See dictated procedure note for full details.    Mohan Spence MD

## 2017-02-08 NOTE — PROGRESS NOTES
"General Surgery Progress Note    Subjective  No acute events. Self catheterization tolerated, good urine output.  Reports no increased in abdominal pain, no worsening dysuria.    Objective  BP 98/61 mmHg  Pulse 90  Temp(Src) 98.3  F (36.8  C) (Oral)  Resp 18  Ht 1.651 m (5' 5\")  Wt 103.692 kg (228 lb 9.6 oz)  BMI 38.04 kg/m2  SpO2 96%    On Exam  NAD, Awake, orientedx4  NLB  Abdomen soft, appropriately tender, worse on right. Incision intact with serous drainage from midline, likely seroma.    I/O last 3 completed shifts:  In: 1611 [P.O.:480; I.V.:1131]  Out: 2855 [Urine:2855]    WBC     15.6   2/8/2017  RBC     3.48   2/8/2017  HGB      9.8   2/8/2017  HCT     30.6   2/8/2017  MCV       88   2/8/2017  MCH     28.2   2/8/2017  MCHC     32.0   2/8/2017  RDW     15.7   2/8/2017  PLT      591   2/8/2017    Last Basic Metabolic Panel:  NA      142   2/8/2017   POTASSIUM      3.5   2/8/2017  CHLORIDE      118   2/8/2017  LILIANA      8.2   2/8/2017  CO2       12   2/8/2017  BUN        7   2/8/2017  CR     1.40   2/8/2017  GLC       88   2/8/2017    All cultures:    Recent Labs  Lab 02/02/17  1755   CULT >100,000 colonies/mL Escherichia coli ESBL ESBL (extended beta lactamase) producing organisms require contact precautions.50,000 to 100,000 colonies/mL Klebsiella pneumoniae This isolate demonstrates carbapenemase production. Contact Precautions Required. Multi-Drug Resistant Organism Consider Contact PrecautionsCritical Value/Significant Value called to and read back by PIOTR DELAROSA RN (7B). 02.05.17 3329 [facility]B notified of carbapenemase producing Klebsiella 2/7/17 0825 dg*     Recent Results (from the past 24 hour(s))   CT Abdomen Pelvis w Contrast    Narrative    Examination:  CT ABDOMEN PELVIS W CONTRAST 2/7/2017 2:52 PM     History: Possible abscess. 57 yo F s/p ex lap, cholecystectomy,  jejunostomy takedown, jejunocolic anastomosis and ventral hernia  repair on 1/25. Urine culture " growing Klebsiella pneumoniae ESBL,  sensitivities pending. Previous radical cystectomy, Indiana pouch  creation.    Comparison: CT abdomen pelvis 2 February, 2017.    Technique: CT of the abdomen and pelvis were obtained 135 mL Isovue  370 intravenous contrast. The patient also received oral contrast.  Sagittal and coronal reconstructions created and reviewed.    Findings: 6 cm round fluid collection with some internal fat  attenuation superiorly (series 4 image 32) may be intrahepatic or  subcapsular, previously 4.5 cm. This slightly effaces the duodenal  bulb. 4 mm hyperattenuating subcapsular area in the right hepatic lobe  (series 2 image 63) is unchanged. The common bile duct caliber is not  dilated, 7 mm in the marti hepatis (series 4 image 45 and pancreatic  head (series 4 image 48).    Cholecystectomy. Jejunocolic anastomosis in the upper abdomen, with  surrounding inflammatory changes and reactive lymph nodes. There is a  small focal dehiscence in the staple line (series 5 image 92, with a  localized extraluminal fluid collection 3.4 by a 6.0 by (series 5  image 97, series 4 image 21), and a focus of adjacent free peritoneal  air (series 3 image 187). Additionally, there is a tubular  extraluminal fluid collection abutting the superior wall of the  colonic side of the anastomosis 7.2 cm long axis, and 2 cm short axis  (series 4 image 22). This demonstrates rim enhancement which is  slightly more pronounced than on previous exam. No intravascular  filling defects in adjacent vessels.    Hysterectomy, radical cystectomy. Decompressed Indiana pouch,  unremarkable appearance of connected catheterizable conduit in the  right lower quadrant. Unchanged fluid collection in the left anterior  pelvis adjacent to the [place] pouch. Some mild distention of the left  ureter is unchanged.    Unchanged multifocal scarring in the left kidney, and significant  atrophy in the right kidney, with a nonobstructive 4 mm renal  stone  lower pole of the right kidney and punctate right renal nonobstructing  stone in the upper pole.    4 x 6 x 2.5 cm nonencapsulated fluid associated with the superior  aspect of the midline abdominal incision. Inferiorly, there is an  additional more organized fluid collection predominantly on the left  side of the midline incision, an 11 x 7 x 1.6 cm (coronal image 9 and  13 best show these findings). Both are increased since the previous  exam.  Moderate dependent anasarca.     The pancreas, adrenal glands, and spleen are unremarkable. No bowel  obstruction.    Lower thorax: Areas of subsegmental atelectasis or consolidation in  both lower lobes. Small left pleural effusion.    Bones: No suspicious bone or soft tissue lesions.      Impression    Impression: Findings suspicious for dehiscence of jejunocolic  anastomosis. Growth/maturation of rim-enhancing 7 cm fluid collection  just superior to the anastomosis, and 3 cm fluid collection abutting  the ventral abdominal wall since CT 5 days prior. These are suspicious  for abscesses.    Growth of 6 cm intrahepatic versus subcapsular fluid collection  (abscess or biloma). Slight increased size of relatively simple  appearing 4 cm and 11 cm fluid collections within the superior and  inferior aspects of the midline surgical incision over the same time,  potentially representing seromas.    Preliminary findings discussed with the surgical service by Dr. Lawson  at 1547 hours 7 February, 2017.    I have personally reviewed the examination and initial interpretation  and I agree with the findings.    CHRISTOPHER HARDIN MD         Assessment/Plan:  Tammy is a 55 yo F s/p ex lap, cholecystectomy, jejunostomy takedown, jejunocolic anastomosis and ventral hernia repair on 1/25. Urine culture growing Klebsiella pneumoniae ESBL, sensitivities pending. Patient with fluid collection abscess vs biloma in liver capsule seen on CT today. Also with fluid and air collection near  anastomosis, stable/maturing on serial imaging (see above).    -continue with IV meropenem for ESBL E. Coli UTI   - Klebsiella growth is resistant to meropenem, but <10^5 colonies, and she has clinically responded well to meropenem. If clinically worsens can start treatment based on sensitivities (pharm note recommended bactrim and tigecycline)  - Fluid/air collection near anastomosis stable/maturing on repeat imaging, will monitor clinically  -rewire PICC while getting IR drainage of liver collection  -NPO until after procedure then back to full liquid diet  - IR drainage of liver collection today  -pain control with PO pain meds  -PT/OT  -PPX: heparin 5000u subq q8h (has refused since 2/2/17, will discuss post-procedure), protonix    Patient discussed with staff, Dr. Ibeth Sutton MD, MPH  PGY-1 [facility]  Pager: [phone]

## 2017-02-08 NOTE — PROGRESS NOTES
CLINICAL NUTRITION SERVICES - REASSESSMENT NOTE     Nutrition Prescription    RECOMMENDATIONS FOR MDs/PROVIDERS TO ORDER:  1. Advance diet back to low-fiber (or per MD discretion) as soon as medically appropriate    2. Once diet advances back to beyond full liquids, recommend ordering calorie counts to assess PO intake adequacy    3. Consider checking zinc levels given ongoing loose stools.  Consider a zinc supplement if deficient.    Malnutrition Status:    Patient does not meet two of the above criteria necessary for diagnosing malnutrition but is at risk for malnutrition    Recommendations already ordered by Registered Dietitian (RD):  None at this time    Future/Additional Recommendations:  1. Consider ordering Nutrisource fiber packets (source of soluble fiber) to see if this helps bulk stool.  Patient could add it to food/beverages as desired/tolerated    2. Recommend patient be able to consistently consume at least 1300 kcal and 62 g PRO daily vs need for additional nutrition intervention    3. If in future pt requires nutrition support, rec place FT and start TF.  If TF contraindicated, recommend initaiting TPN  -- If EN appropriate, would recommend an goal TF regimen of Peptamen 1.5 (semi-elemental, fiber-free) @ goal 50 ml/hr (1200 ml/day) to provide 1800 kcals, 82 g PRO, 924 ml free H2O, 67 g Fat (70% from MCTs), 226 g CHO and no Fiber daily.   -- If PN appropriate, would recommend if K+/Mg++ >/= nrml and phos >1.9 to initiate 150 g dextrose (GIR 1.5 mg/kg/min), 100 g AA, and 250 mL 20% IV lipids 5x/week.  Rec advance dextrose by 50 g/day if K+/Mg++ >/= nrml and phos >1.9 and BGs stable to goal dextrose 200 g/day.  Goal TPN would provide 1437 kcal (21 kcal/kg), 1.5 g/kg PRO, GIR 2 mg/kg/min, and 25% kcal from fat per dosing weight 68 kg.       *Obtained information from chart, pt busy/off to IR during attempts to visit  EVALUATION OF THE PROGRESS TOWARD GOALS   Diet: NPO today for procedure.  Full liquids  1/31-2/2 then adv to low fiber, back to full liquids then clear liquids on 2/3, back to full liquids on 2/4  Intake: Consuming mostly 100% of meals documented, however 2 meals documented at 25%, and suspect not all meals documented.  Appetite noted as poor-fair the past week, no appetite noted on 2/7. RN note on 2/4 noted patient not tolerating food, mostly consuming Ensure Clear and water.  Pt reported that she felt better after drinking 2 Ensure Clears on 2/4. Per [facility] on 2/4 pt only ordered Ensure Clears, Ensure Plus, and magic cups from kitchen, on 2/5 ordered supplements and 1 meal of scrambled eggs, mashed potato, and banana.  On 2/6 ordered 2 meals providing daily total 1575 kcal and 72 g PRO.  Yesterday ordered 1 meal containing 1181 kcal and 52 g PRO.       NEW FINDINGS   Weight: 102.7 kg on 2/7, has been fluctuating since admit, but has remained above admit wt of 100.5 kg on 1/25.  Labs: Cr 1.4 (H), GFR 39 (H); K+ WNL today but has been intermittently low since 1/29, per discussion with provider in rounds likely related in part to diarrhea  Meds: D5 IVF @ 100 mL/hr; began Potassium chloride supplementation today (20 mEq BID)  GI: stools documented as watery, tan/brown the past week    ASSESSED NUTRITION NEEDS  Estimated Energy Needs: 9277-6790 kcals/day (25-30+ kcals/kg); 0538-1616 kcals/day (20-25 kcals/kg)  Justification: Maintenance w/ short gut, weight status; aim lower end if requires TPN to prevent overfeeding  Estimated Protein Needs:  grams protein/day (1.2 - 1.5 grams of pro/kg)  Justification: Increased needs w/ acute illness, post-op  Estimated Fluid Needs: 1 mL/kcal + GI losses,  or per provider  Justification: Maintenance    MALNUTRITION  % Intake: </= 50% for >/= 5 days (severe) vs < 75% for > 7 days (non-severe)  % Weight Loss: None noted  Subcutaneous Fat Loss: None observed per RD note 2/1  Muscle Loss: None observed  per RD note 2/1  Fluid Accumulation/Edema: None noted per  chart review  Malnutrition Diagnosis: Patient does not meet two of the above criteria necessary for diagnosing malnutrition but is at risk for malnutrition    Previous Goals   1. Diet adv beyond full liquids v need to consider nutrition support within 2-3 days.  Evaluation: Met  2. Patient to consume % of nutritionally adequate meal trays TID, or the equivalent with supplements/snacks.  Evaluation: Not met    Previous Nutrition Diagnosis  Inadequate oral intake related to limitations with diet advancement and abdominal distension, nausea/emesis as evidenced by NPO/Clear liquids x 6 days, full liquid diet x 1 day and patient report of tolerating smaller amounts of food with some pain  Evaluation: Improving    CURRENT NUTRITION DIAGNOSIS  Inadequate oral intake related to poor appetite and suspect loose stools hindering adequate PO intakes and as evidenced by mostly poor-fair appetite documented the past week     INTERVENTIONS  Implementation  Chart review    Goals  Diet adv v nutrition support within 2-3 days.  Total avg nutritional intake to meet a minimum of 25 kcal/kg and 1.2 g PRO/kg daily (per dosing wt 68 kg).    Monitoring/Evaluation  Progress toward goals will be monitored and evaluated per protocol.     Josseline Jain RD, LD   7B RD Pager: [phone]

## 2017-02-08 NOTE — PROGRESS NOTES
"General Surgery Progress Note    Subjective  No acute events. Tolerating fulls    Objective  /67 mmHg  Pulse 90  Temp(Src) 98.2  F (36.8  C) (Oral)  Resp 18  Ht 1.651 m (5' 5\")  Wt 103.692 kg (228 lb 9.6 oz)  BMI 38.04 kg/m2  SpO2 96%    On Exam  NAD, Awake, orientedx4  NLB  Abdomen soft, appropriately tender, worse on right. Incision intact with serous drainage from midline, likely seroma.    I/O last 3 completed shifts:  In: 1287 [P.O.:360; I.V.:927]  Out: 2730 [Urine:2730]    WBC     15.4   2/7/2017  RBC     3.35   2/7/2017  HGB      9.5   2/7/2017  HCT     29.6   2/7/2017  No components found with this name: mct  MCV       88   2/7/2017  MCH     28.4   2/7/2017  MCHC     32.1   2/7/2017  RDW     15.6   2/7/2017  PLT      548   2/7/2017  basic metabolic panel    Assessment/Plan:  Tammy is a 55 yo F s/p ex lap, cholecystectomy, jejunostomy takedown, jejunocolic anastomosis and ventral hernia repair on 1/25. Urine culture growing Klebsiella pneumoniae ESBL, sensitivities pending. Patient with fluid collection abscess vs biloma in liver capsule seen on CT today. Also with likely resolved contained leak seen on serial imaging.    -continue with IV meropenem  -Low residue diet, NPO at midnight  - IR drainage of liver collection tomorrow.  -pain control with PO pain meds  -PT/OT  -PPX: lovenox, protonix    Patient discussed with staff, Dr. Cristina and Jogre L Leung   Surgery PGY2  [phone]    "

## 2017-02-08 NOTE — PLAN OF CARE
"Problem: Goal Outcome Summary  Goal: Goal Outcome Summary  Outcome: No Change  RN [id number]    /69 mmHg  Pulse 83  Temp(Src) 97.6  F (36.4  C) (Oral)  Resp 18  Ht 1.651 m (5' 5\")  Wt 103.692 kg (228 lb 9.6 oz)  BMI 38.04 kg/m2  SpO2 96%    A&Ox4. Afebrile, AVSS. O2 sats 96% on RA. Denies SOB. LS clear and diminished. C/o pain in right abdomen, given Oxy x3 w/ adequate relief. Up independently. Pt straight caths neobladder independently, good output. Closed healing jejunostomy wound on top of neobladder. Midline incision w/ staples, RUSTY. Distal part w/ packing and covered w/ gauze. Belly rounded, + BS, + flatus, pt states she's having BM's. NPO at midnight for possible IR drainage today. Denies N/V. K+ recheck was 3.2, replacement completed overnight, recheck this AM. PIV w/ MIVF @ 100 mL/h and ABX given as ordered. Able to make needs known. Observed to be resting in between cares. Continue with POC.         "

## 2017-02-08 NOTE — PROGRESS NOTES
Interventional Radiology Pre-Procedure Sedation Assessment   Time of Assessment: 2:46 PM    Expected Level: Moderate Sedation    Indication: Sedation is required for the following type of Procedure: Drain    Sedation and procedural consent: Risks, benefits and alternatives were discussed with Patient    PO Intake: Appropriately NPO for procedure    ASA Class: Class 2 - MILD SYSTEMIC DISEASE, NO ACUTE PROBLEMS, NO FUNCTIONAL LIMITATIONS.    Mallampati: Grade 3:  Soft palate visible, posterior pharyngeal wall not visible    Lungs: Lungs Clear with good breath sounds bilaterally    Heart: Normal heart sounds and rate    History and physical reviewed and no updates needed. I have reviewed the lab findings, diagnostic data, medications, and the plan for sedation. I have determined this patient to be an appropriate candidate for the planned sedation and procedure and have reassessed the patient IMMEDIATELY PRIOR to sedation and procedure.    Mohan Spence MD

## 2017-02-08 NOTE — PROGRESS NOTES
Interventional Radiology Intra-procedural Nursing Note    Patient Name: Tammy Borges  Medical Record Number: [id number]  Today's Date: February 8, 2017         Start Time: 1505  End of procedure time: 1530  Procedure: Percutaneous abcess drain placement gallbladder fossa.  Fire Safety Score: 1    Consent Review/Timeout Performed by:  Carl Erwin MD/ Tito Spence MD  Procedure Performed By: Carl Erwin MD/ Tito Spence MD    Fentanyl administered: 50 mcg  Versed administered: 1 mg    Start of Sedation Time: 1505  End of Sedation Time: 1530  Total Sedation Time: 25 minutes    Report given to: Dee ANSARI   Time pt departs:  1545      Other Notes:  Alert female transported via cart from inpatient room to IR Procedure Room 6 for planned intervention.  ID band confirmed and patient acknowledges understanding of planned procedure. Patient repositioned to supine.  Patient prepped and draped per policy see VS flowsheet, MAR for further information.     Right abdominal site identified via image guidance.   12 F x 35 cm Skater drain inserted and returns noted. Lot # [id number] expiration 2021-08.   120 cc obtained for lab specimen.  Samples labeled and sent per MD order.      Patient returned to inpatient room for post-procedure monitoring.    Shobha Crespo RN

## 2017-02-08 NOTE — PLAN OF CARE
Problem: Goal Outcome Summary  Goal: Goal Outcome Summary  PT 7B: Cancel - pt down at IR during time of attempt for drain placement, per RN. Will re-schedule as appropriate.

## 2017-02-08 NOTE — CONSULTS
Patient is on IR schedule 2/8/2017 for a Ultrasound guided drain placement in to the gallbladder fossa fluid collection   Labs WNL for procedure.   Orders for NPO, scrubs and antibiotics have been entered.  Consent will be done prior to procedure.   Please contact the IR charge RN at [phone] for estimated time of procedure.     Leeanna Moody IR RPA  [phone] Call pager  [phone] pager

## 2017-02-08 NOTE — PLAN OF CARE
Problem: Goal Outcome Summary  Goal: Goal Outcome Summary  Outcome: No Change  VSS. Prn oxycodone for mid abd pain. Incision intact with small dressing and staples. CT of abd/pelvis completed this afternoon. Made NPO at midnight for possible IR procedure, IV fluids to start then. K+ recheck was 3.0, replaced, recheck at 11pm. Prn zofran given after CT contrast. Pt self caths about every 4 hours.

## 2017-02-09 ENCOUNTER — APPOINTMENT (OUTPATIENT)
Dept: PHYSICAL THERAPY | Facility: CLINIC | Age: 57
DRG: 329 | End: 2017-02-09
Attending: SURGERY
Payer: COMMERCIAL

## 2017-02-09 ENCOUNTER — HOSPITAL ENCOUNTER (INPATIENT)
Dept: VASCULAR ULTRASOUND | Facility: CLINIC | Age: 57
DRG: 329 | End: 2017-02-08
Attending: SURGERY | Admitting: SURGERY
Payer: COMMERCIAL

## 2017-02-09 LAB
ANION GAP SERPL CALCULATED.3IONS-SCNC: 12 MMOL/L (ref 3–14)
BUN SERPL-MCNC: 6 MG/DL (ref 7–30)
CALCIUM SERPL-MCNC: 8 MG/DL (ref 8.5–10.1)
CHLORIDE SERPL-SCNC: 116 MMOL/L (ref 94–109)
CO2 SERPL-SCNC: 15 MMOL/L (ref 20–32)
CREAT SERPL-MCNC: 1.35 MG/DL (ref 0.52–1.04)
ERYTHROCYTE [DISTWIDTH] IN BLOOD BY AUTOMATED COUNT: 15.8 % (ref 10–15)
GFR SERPL CREATININE-BSD FRML MDRD: 41 ML/MIN/1.7M2
GLUCOSE BLDC GLUCOMTR-MCNC: 73 MG/DL (ref 70–99)
GLUCOSE SERPL-MCNC: 89 MG/DL (ref 70–99)
HCT VFR BLD AUTO: 31.1 % (ref 35–47)
HGB BLD-MCNC: 9.8 G/DL (ref 11.7–15.7)
MAGNESIUM SERPL-MCNC: 2.7 MG/DL (ref 1.6–2.3)
MCH RBC QN AUTO: 27.8 PG (ref 26.5–33)
MCHC RBC AUTO-ENTMCNC: 31.5 G/DL (ref 31.5–36.5)
MCV RBC AUTO: 88 FL (ref 78–100)
PHOSPHATE SERPL-MCNC: 2.6 MG/DL (ref 2.5–4.5)
PLATELET # BLD AUTO: 603 10E9/L (ref 150–450)
POTASSIUM SERPL-SCNC: 3.4 MMOL/L (ref 3.4–5.3)
RBC # BLD AUTO: 3.53 10E12/L (ref 3.8–5.2)
SODIUM SERPL-SCNC: 144 MMOL/L (ref 133–144)
WBC # BLD AUTO: 9.8 10E9/L (ref 4–11)

## 2017-02-09 PROCEDURE — 80048 BASIC METABOLIC PNL TOTAL CA: CPT | Performed by: STUDENT IN AN ORGANIZED HEALTH CARE EDUCATION/TRAINING PROGRAM

## 2017-02-09 PROCEDURE — 25000132 ZZH RX MED GY IP 250 OP 250 PS 637: Performed by: STUDENT IN AN ORGANIZED HEALTH CARE EDUCATION/TRAINING PROGRAM

## 2017-02-09 PROCEDURE — 12000008 ZZH R&B INTERMEDIATE UMMC

## 2017-02-09 PROCEDURE — 25000128 H RX IP 250 OP 636: Performed by: SURGERY

## 2017-02-09 PROCEDURE — 00000146 ZZHCL STATISTIC GLUCOSE BY METER IP

## 2017-02-09 PROCEDURE — 25000132 ZZH RX MED GY IP 250 OP 250 PS 637: Performed by: SURGERY

## 2017-02-09 PROCEDURE — 25000128 H RX IP 250 OP 636: Performed by: STUDENT IN AN ORGANIZED HEALTH CARE EDUCATION/TRAINING PROGRAM

## 2017-02-09 PROCEDURE — 83735 ASSAY OF MAGNESIUM: CPT | Performed by: STUDENT IN AN ORGANIZED HEALTH CARE EDUCATION/TRAINING PROGRAM

## 2017-02-09 PROCEDURE — 85027 COMPLETE CBC AUTOMATED: CPT | Performed by: STUDENT IN AN ORGANIZED HEALTH CARE EDUCATION/TRAINING PROGRAM

## 2017-02-09 PROCEDURE — 40000193 ZZH STATISTIC PT WARD VISIT

## 2017-02-09 PROCEDURE — 84100 ASSAY OF PHOSPHORUS: CPT | Performed by: STUDENT IN AN ORGANIZED HEALTH CARE EDUCATION/TRAINING PROGRAM

## 2017-02-09 PROCEDURE — 25000125 ZZHC RX 250

## 2017-02-09 PROCEDURE — 97530 THERAPEUTIC ACTIVITIES: CPT | Mod: GP

## 2017-02-09 PROCEDURE — C1751 CATH, INF, PER/CENT/MIDLINE: HCPCS

## 2017-02-09 PROCEDURE — 97116 GAIT TRAINING THERAPY: CPT | Mod: GP

## 2017-02-09 PROCEDURE — 25800025 ZZH RX 258: Performed by: STUDENT IN AN ORGANIZED HEALTH CARE EDUCATION/TRAINING PROGRAM

## 2017-02-09 PROCEDURE — 36415 COLL VENOUS BLD VENIPUNCTURE: CPT | Performed by: STUDENT IN AN ORGANIZED HEALTH CARE EDUCATION/TRAINING PROGRAM

## 2017-02-09 PROCEDURE — 25000125 ZZHC RX 250: Performed by: STUDENT IN AN ORGANIZED HEALTH CARE EDUCATION/TRAINING PROGRAM

## 2017-02-09 PROCEDURE — 36584 COMPL RPLCMT PICC RS&I: CPT

## 2017-02-09 RX ORDER — MEROPENEM 500 MG/1
500 INJECTION, POWDER, FOR SOLUTION INTRAVENOUS EVERY 6 HOURS
Qty: 560 ML | Refills: 0 | Status: SHIPPED | OUTPATIENT
Start: 2017-02-09

## 2017-02-09 RX ORDER — MEROPENEM 500 MG/1
500 INJECTION, POWDER, FOR SOLUTION INTRAVENOUS EVERY 6 HOURS
Qty: 560 ML | Refills: 0 | Status: SHIPPED | OUTPATIENT
Start: 2017-02-09 | End: 2017-02-09

## 2017-02-09 RX ADMIN — OXYCODONE HYDROCHLORIDE 10 MG: 5 TABLET ORAL at 05:07

## 2017-02-09 RX ADMIN — HEPARIN SODIUM 5000 UNITS: 5000 INJECTION, SOLUTION INTRAVENOUS; SUBCUTANEOUS at 05:00

## 2017-02-09 RX ADMIN — ACETAMINOPHEN 1000 MG: 325 TABLET, FILM COATED ORAL at 17:13

## 2017-02-09 RX ADMIN — POTASSIUM CHLORIDE: 2 INJECTION, SOLUTION, CONCENTRATE INTRAVENOUS at 20:22

## 2017-02-09 RX ADMIN — LIDOCAINE HYDROCHLORIDE 2 ML: 10 INJECTION, SOLUTION INFILTRATION; PERINEURAL at 10:13

## 2017-02-09 RX ADMIN — MEROPENEM 500 MG: 500 INJECTION, POWDER, FOR SOLUTION INTRAVENOUS at 01:30

## 2017-02-09 RX ADMIN — POTASSIUM CHLORIDE 20 MEQ: 750 TABLET, EXTENDED RELEASE ORAL at 08:17

## 2017-02-09 RX ADMIN — OXYCODONE HYDROCHLORIDE 10 MG: 5 TABLET ORAL at 21:21

## 2017-02-09 RX ADMIN — OXYCODONE HYDROCHLORIDE 10 MG: 5 TABLET ORAL at 17:13

## 2017-02-09 RX ADMIN — OXYCODONE HYDROCHLORIDE 10 MG: 5 TABLET ORAL at 11:09

## 2017-02-09 RX ADMIN — POTASSIUM CHLORIDE, DEXTROSE MONOHYDRATE AND SODIUM CHLORIDE: 150; 5; 450 INJECTION, SOLUTION INTRAVENOUS at 05:04

## 2017-02-09 RX ADMIN — MEROPENEM 500 MG: 500 INJECTION, POWDER, FOR SOLUTION INTRAVENOUS at 08:18

## 2017-02-09 RX ADMIN — ONDANSETRON 4 MG: 2 INJECTION INTRAMUSCULAR; INTRAVENOUS at 18:16

## 2017-02-09 RX ADMIN — MEROPENEM 500 MG: 500 INJECTION, POWDER, FOR SOLUTION INTRAVENOUS at 20:21

## 2017-02-09 RX ADMIN — OXYCODONE HYDROCHLORIDE 10 MG: 5 TABLET ORAL at 01:30

## 2017-02-09 RX ADMIN — PANTOPRAZOLE SODIUM 40 MG: 40 TABLET, DELAYED RELEASE ORAL at 08:18

## 2017-02-09 RX ADMIN — LEVOTHYROXINE SODIUM 275 MCG: 175 TABLET ORAL at 08:18

## 2017-02-09 RX ADMIN — OXYCODONE HYDROCHLORIDE 10 MG: 5 TABLET ORAL at 14:11

## 2017-02-09 RX ADMIN — MEROPENEM 500 MG: 500 INJECTION, POWDER, FOR SOLUTION INTRAVENOUS at 14:49

## 2017-02-09 RX ADMIN — I.V. FAT EMULSION 250 ML: 20 EMULSION INTRAVENOUS at 20:21

## 2017-02-09 RX ADMIN — OXYCODONE HYDROCHLORIDE 10 MG: 5 TABLET ORAL at 08:17

## 2017-02-09 RX ADMIN — ACETAMINOPHEN 1000 MG: 325 TABLET, FILM COATED ORAL at 08:17

## 2017-02-09 NOTE — PROGRESS NOTES
Care Coordinator- Discharge Planning     Admission Date/Time:  1/25/2017  Attending MD:  Alban Coats MD     Data  Date of initial CC assessment:  1/31/2017  Chart reviewed, discussed with interdisciplinary team.   Patient was admitted for:   1. Ventral hernia without obstruction or gangrene    2. Small bowel anastomotic leak         Assessment  Full assessment completed in previous note    Coordination of Care and Referrals: Provided patient/family with options for Home Infusion.     Per MD team patient will likely be ready for discharge to home tomorrow 2/10 with home TPN and IV antibiotics.  Called [facility] health pharmacist Blayne Uriarte(P: [phone], F: [phone]) to coordinate this.  Per Blayne they can manage the patients TPN and IV antibiotics.  Referral placed and orders faxed.  Blayne reports that they complete all the teaching about how to manage home TPN with the patient.  Patient gets lab work and PICC line dressing changes at the [facility].  Patient called and made an appointment next Thursday 2/16 for PICC line care and lab work.  Lab work to be ordered by the [facility] health infusion pharmacist Blayne.  If Blayne would like labs prior to Thursday 2/16 they will contact the patient.  Patient plans on discharging from the hospital and flying home.  While travelling patient would need to be unhooked from the TPN.  General surgery team ok with this.  Will fax final discharge paperwork to [facility] tomorrow.  Will continue to follow and assist with discharge planning.      Plan  Anticipated Discharge Date:  2/10/2017  Anticipated Discharge Plan:  Home with [facility] home infusion pharmacist supplying patient with home TPN and IV antibiotics      Thais Martinez, RNCC  [phone]

## 2017-02-09 NOTE — PROGRESS NOTES
CLINICAL NUTRITION SERVICES - BRIEF NOTE    Received consult for Pharmacy/Nutrition to Start & Manage TPN in the setting of bowel perforation    Nutrition Prescription    RECOMMENDATIONS FOR MDs/PROVIDERS TO ORDER:  -Closely monitor electrolytes and replace as needed    Recommendations already ordered by Registered Dietitian (RD):  -TPN Change: Once central line in place, K+/Mg++ >/= nrml and phos >1.9, start TPN: 1200 mL (or per PharmD/MD discretion), 150 g dextrose (GIR 1.5 mg/kg/min), 100 g AA, and 250 mL 20% IV lipids 5x/week.  Rec advance dextrose by 50 g/day if K+/Mg++ >/= nrml and phos >1.9 and BGs stable to goal dextrose 200 g/day.  Goal TPN would provide 1437 kcal (21 kcal/kg), 1.5 g/kg PRO, GIR 2 mg/kg/min, and 25% kcal from fat per dosing weight 68 kg.      Future/Additional Recommendations:  -PN start, adv lytes     Nutrition Progress Note - f/u for progress towards previous nutrition POC (see previous 2/8 reassessment for details)    Odessa Verduzco RDN, LD  Pgr: [phone]

## 2017-02-09 NOTE — PLAN OF CARE
Problem: Goal Outcome Summary  Goal: Goal Outcome Summary  Outcome: No Change  Filed Vitals:     02/08/17 1615 02/08/17 1630 02/08/17 1645 02/08/17 1715   BP: 103/65 101/61 108/63 103/61   Pulse: 70 76   76   Temp:           TempSrc:           Resp:           Height:           Weight:           SpO2: 96% 94% 96% 93%     AVSS, pt A&O x 4, intermittent complaints of pain, PO oxycodone given x 3 and IV dilaudid given x 1 with some relief. Pt had drain placed in RUQ in IR today around 1430. Scant amount of SS output in bag. Midline incision RUSTY with staples, no drainage, small dressing in center, CDI. IV merem given as ordered.  Pt self caths bridget-bladder, urine output adequate. Pt having BMs. Diet increased to full liquid. Pt tolerating. Up with SBA. Continuing to monitor per POC.

## 2017-02-09 NOTE — PLAN OF CARE
Problem: Goal Outcome Summary  Goal: Goal Outcome Summary  Outcome: No Change  Filed Vitals:     02/08/17 1715 02/08/17 2303 02/09/17 0812 02/09/17 1620   BP: 103/61 104/84 104/43 118/70   Pulse: 76 73 74 73   Temp:   96.1  F (35.6  C) 96  F (35.6  C) 96.1  F (35.6  C)   TempSrc:   Oral Oral Oral   Resp:   16 18 18   Height:           Weight:           SpO2: 93% 97% 97% 99%     AVSS, pt A&O x 4, intermittent complaints of abdominal pain. PO oxycodone given x 3 with adequate relief. Midline incision closed via staples, no drainage, URSTY. RUQ drain irrigated with 10 ml NS x 2, draining small amount of SS output. Urine output adequate via bridget-bladder, pt self-caths. Continues to have loose BMs. PICC placed this AM. IV abx given as ordered. Pt up with SBA. Possible discharge tomorrow with home TPN and abx, continuing to monitor per POC.

## 2017-02-09 NOTE — PLAN OF CARE
Problem: Goal Outcome Summary  Goal: Goal Outcome Summary  PT 7B: Engaged pt in bed mobility IND, sit <> stand IND, gait without AD for 75ft SBA, gait with straight cane for 250ft MOD I, ascending/descending 4 steps x 3 reps with B hand railings at IND. Pt with fear of falling upon return home - extensive education on safe mobilization in home and reducing trip hazards. Also recommending pt use SPC upon return home secondary to significantly improved stability when ambulating with this AD as compared to no AD. PT recommending discharge to home when medically appropriate with assist from family as needed to maintain abdominal precautions.

## 2017-02-09 NOTE — PROGRESS NOTES
"General Surgery Progress Note    Subjective  Had IR drainage of hepatic collection yesterday, doing well. Self catheterization tolerated, good urine output.  Reports no increase in abdominal pain, no worsening dysuria.      Objective  /43 mmHg  Pulse 74  Temp(Src) 96  F (35.6  C) (Oral)  Resp 18  Ht 1.651 m (5' 5\")  Wt 103.692 kg (228 lb 9.6 oz)  BMI 38.04 kg/m2  SpO2 97%    On Exam  NAD, Awake, orientedx4  NLB  Abdomen soft, appropriately tender, worse on right. Incision intact.    I/O last 3 completed shifts:  In: 3733.33 [P.O.:900; I.V.:2833.33]  Out: 2575 [Urine:2425; Drains:150]    WBC      9.8   2/9/2017  RBC     3.53   2/9/2017  HGB      9.8   2/9/2017  HCT     31.1   2/9/2017  MCV       88   2/9/2017  MCH     27.8   2/9/2017  MCHC     31.5   2/9/2017  RDW     15.8   2/9/2017  PLT      603   2/9/2017    Last Basic Metabolic Panel:  NA      144   2/9/2017   POTASSIUM      3.4   2/9/2017  CHLORIDE      116   2/9/2017  LILIANA      8.0   2/9/2017  CO2       15   2/9/2017  BUN        6   2/9/2017  CR     1.35   2/9/2017  GLC       89   2/9/2017    Assessment/Plan:  Tammy is a 55 yo F s/p ex lap, cholecystectomy, jejunostomy takedown, jejunocolic anastomosis and ventral hernia repair on 1/25. Urine culture growing E. Coli ESBL and colonized with Klebsiella CRE. Hepatic fluid collection s/p IR drainage 2/8/17.  Also with fluid and air collection near anastomosis, stable/maturing on serial imaging.    -continue with IV meropenem for ESBL E. Coli UTI   - Klebsiella growth is resistant to meropenem, but <10^5 colonies, and she has clinically responded well to meropenem. If clinically worsens can start treatment based on sensitivities (pharm note recommended bactrim and tigecycline)  -Fluid/air collection near anastomosis: stable/maturing on repeat imaging, clinically doing well   - Discussed options with patient, shared decision made to try bowel rest and monitor as collection appears stable. -rewire PICC " today  -NPO for one week for bowel rest  -IR drainage of liver 2/8: fluid not consistent with biloma, culture pending  -pain control with PO pain meds  -PPX: heparin 5000u subq q8h, protonix    Patient discussed with staff, Dr. Ibeth Sutton MD, MPH  PGY-1 [facility]  Pager: [phone]

## 2017-02-09 NOTE — PLAN OF CARE
"Problem: Goal Outcome Summary  Goal: Goal Outcome Summary  Outcome: No Change  RN [id number]    /84 mmHg  Pulse 73  Temp(Src) 96.1  F (35.6  C) (Oral)  Resp 16  Ht 1.651 m (5' 5\")  Wt 103.692 kg (228 lb 9.6 oz)  BMI 38.04 kg/m2  SpO2 97%    A&Ox4. Afebrile, AVSS. O2 sats 97% on RA. Denies SOB. LS clear and diminished. C/o pain in right abdomen, given Oxy x3 w/ adequate relief. Up independently. Straight caths neobladder independently, good output. Midline incision w/ staples, small drainage - covered w/ gauze. Drain flushed per orders w/ 30cc SS drainage. Belly rounded, + BS, + flatus, pt states she's having loose BM's. Full liquid diet, tolerating well, denies N/V. PIV w/ MIVF @ 100 mL/h and ABX given as ordered. Pt agreed to take heparin this AM. Able to make needs known. Observed to be resting in between cares. Continue with POC.         "

## 2017-02-10 ENCOUNTER — CARE COORDINATION (OUTPATIENT)
Dept: CARDIOLOGY | Facility: CLINIC | Age: 57
End: 2017-02-10

## 2017-02-10 VITALS
HEART RATE: 76 BPM | BODY MASS INDEX: 38.09 KG/M2 | RESPIRATION RATE: 16 BRPM | WEIGHT: 228.6 LBS | OXYGEN SATURATION: 95 % | TEMPERATURE: 96.1 F | SYSTOLIC BLOOD PRESSURE: 104 MMHG | DIASTOLIC BLOOD PRESSURE: 65 MMHG | HEIGHT: 65 IN

## 2017-02-10 LAB
ALBUMIN SERPL-MCNC: 1.8 G/DL (ref 3.4–5)
ALP SERPL-CCNC: 73 U/L (ref 40–150)
ALT SERPL W P-5'-P-CCNC: 12 U/L (ref 0–50)
ANION GAP SERPL CALCULATED.3IONS-SCNC: 11 MMOL/L (ref 3–14)
AST SERPL W P-5'-P-CCNC: 8 U/L (ref 0–45)
BACTERIA SPEC CULT: ABNORMAL
BILIRUB SERPL-MCNC: 0.2 MG/DL (ref 0.2–1.3)
BUN SERPL-MCNC: 8 MG/DL (ref 7–30)
CALCIUM SERPL-MCNC: 8 MG/DL (ref 8.5–10.1)
CHLORIDE SERPL-SCNC: 116 MMOL/L (ref 94–109)
CO2 SERPL-SCNC: 15 MMOL/L (ref 20–32)
CREAT SERPL-MCNC: 1.15 MG/DL (ref 0.52–1.04)
ERYTHROCYTE [DISTWIDTH] IN BLOOD BY AUTOMATED COUNT: 15.8 % (ref 10–15)
GFR SERPL CREATININE-BSD FRML MDRD: 49 ML/MIN/1.7M2
GLUCOSE BLDC GLUCOMTR-MCNC: 91 MG/DL (ref 70–99)
GLUCOSE SERPL-MCNC: 106 MG/DL (ref 70–99)
HCT VFR BLD AUTO: 30.9 % (ref 35–47)
HGB BLD-MCNC: 9.9 G/DL (ref 11.7–15.7)
INR PPP: 1.47 (ref 0.86–1.14)
Lab: ABNORMAL
MAGNESIUM SERPL-MCNC: 2.5 MG/DL (ref 1.6–2.3)
MCH RBC QN AUTO: 28.4 PG (ref 26.5–33)
MCHC RBC AUTO-ENTMCNC: 32 G/DL (ref 31.5–36.5)
MCV RBC AUTO: 89 FL (ref 78–100)
MICRO REPORT STATUS: ABNORMAL
MICROORGANISM SPEC CULT: ABNORMAL
PHOSPHATE SERPL-MCNC: 2.8 MG/DL (ref 2.5–4.5)
PLATELET # BLD AUTO: 560 10E9/L (ref 150–450)
POTASSIUM SERPL-SCNC: 3.2 MMOL/L (ref 3.4–5.3)
PREALB SERPL IA-MCNC: 13 MG/DL (ref 15–45)
PROT SERPL-MCNC: 6.4 G/DL (ref 6.8–8.8)
RBC # BLD AUTO: 3.48 10E12/L (ref 3.8–5.2)
SODIUM SERPL-SCNC: 142 MMOL/L (ref 133–144)
SPECIMEN SOURCE: ABNORMAL
WBC # BLD AUTO: 10.9 10E9/L (ref 4–11)

## 2017-02-10 PROCEDURE — 00000146 ZZHCL STATISTIC GLUCOSE BY METER IP

## 2017-02-10 PROCEDURE — 83735 ASSAY OF MAGNESIUM: CPT

## 2017-02-10 PROCEDURE — 25000132 ZZH RX MED GY IP 250 OP 250 PS 637: Performed by: SURGERY

## 2017-02-10 PROCEDURE — 25000128 H RX IP 250 OP 636: Performed by: SURGERY

## 2017-02-10 PROCEDURE — 80048 BASIC METABOLIC PNL TOTAL CA: CPT

## 2017-02-10 PROCEDURE — 36592 COLLECT BLOOD FROM PICC: CPT

## 2017-02-10 PROCEDURE — 85610 PROTHROMBIN TIME: CPT

## 2017-02-10 PROCEDURE — 80053 COMPREHEN METABOLIC PANEL: CPT

## 2017-02-10 PROCEDURE — 84100 ASSAY OF PHOSPHORUS: CPT

## 2017-02-10 PROCEDURE — 85027 COMPLETE CBC AUTOMATED: CPT

## 2017-02-10 PROCEDURE — 25000132 ZZH RX MED GY IP 250 OP 250 PS 637: Performed by: STUDENT IN AN ORGANIZED HEALTH CARE EDUCATION/TRAINING PROGRAM

## 2017-02-10 PROCEDURE — 25000128 H RX IP 250 OP 636: Performed by: STUDENT IN AN ORGANIZED HEALTH CARE EDUCATION/TRAINING PROGRAM

## 2017-02-10 PROCEDURE — 84134 ASSAY OF PREALBUMIN: CPT

## 2017-02-10 RX ORDER — OXYCODONE HYDROCHLORIDE 5 MG/1
5-10 TABLET ORAL
Qty: 40 TABLET | Refills: 0 | Status: SHIPPED | OUTPATIENT
Start: 2017-02-10 | End: 2017-02-10

## 2017-02-10 RX ORDER — OXYCODONE HYDROCHLORIDE 5 MG/1
5-10 TABLET ORAL
Qty: 60 TABLET | Refills: 0 | Status: SHIPPED | OUTPATIENT
Start: 2017-02-10

## 2017-02-10 RX ADMIN — ONDANSETRON 4 MG: 2 INJECTION INTRAMUSCULAR; INTRAVENOUS at 02:53

## 2017-02-10 RX ADMIN — ACETAMINOPHEN 1000 MG: 325 TABLET, FILM COATED ORAL at 08:19

## 2017-02-10 RX ADMIN — MEROPENEM 500 MG: 500 INJECTION, POWDER, FOR SOLUTION INTRAVENOUS at 12:02

## 2017-02-10 RX ADMIN — MEROPENEM 500 MG: 500 INJECTION, POWDER, FOR SOLUTION INTRAVENOUS at 02:07

## 2017-02-10 RX ADMIN — OXYCODONE HYDROCHLORIDE 10 MG: 5 TABLET ORAL at 08:19

## 2017-02-10 RX ADMIN — PANTOPRAZOLE SODIUM 40 MG: 40 TABLET, DELAYED RELEASE ORAL at 08:19

## 2017-02-10 RX ADMIN — LEVOTHYROXINE SODIUM 275 MCG: 175 TABLET ORAL at 08:19

## 2017-02-10 RX ADMIN — MEROPENEM 500 MG: 500 INJECTION, POWDER, FOR SOLUTION INTRAVENOUS at 08:19

## 2017-02-10 RX ADMIN — OXYCODONE HYDROCHLORIDE 10 MG: 5 TABLET ORAL at 12:30

## 2017-02-10 RX ADMIN — OXYCODONE HYDROCHLORIDE 10 MG: 5 TABLET ORAL at 02:22

## 2017-02-10 NOTE — PLAN OF CARE
Problem: Goal Outcome Summary  Goal: Goal Outcome Summary  Outcome: Adequate for Discharge Date Met:  02/10/17  Filed Vitals:     02/09/17 1620 02/09/17 2123 02/09/17 2308 02/10/17 0823   BP: 118/70 105/66 100/66 104/65   Pulse: 73 76 78 76   Temp: 96.1  F (35.6  C) 96.8  F (36  C) 96.3  F (35.7  C) 96.1  F (35.6  C)   TempSrc: Oral Oral Oral Oral   Resp: 18 18 18 16   Height:           Weight:           SpO2: 99% 99% 97% 95%     AVSS, pt A&O x 4, intermittent complaints of pain, PO oxycodone given x 2 with adequate relief. Right abdominal drain irrigated x 1, drain care teaching done with patient. IV abx given as ordered, prior to discharge. Pt adequate for discharge, instructions given and signed. PICC saline locked, pt discharging to home with home TPN and abx.

## 2017-02-10 NOTE — DISCHARGE SUMMARY
General Surgery Discharge Summary    Tammy Borges MRN# [id number]   YOB: 1960 Age: 56 year old     Date of Admission:  1/25/2017  Date of Discharge::  2/10/2017  Admitting Physician:  Alban Coats MD  Discharge Physician:  Dada Cristina MD  Primary Care Physician:        Leonard Stovall MD          Admission Diagnoses:   Ventral Hernia, Gallstones; Neurogenic Bladder   Ventral hernia  End jejunostomy with short bowel          Discharge Diagnosis:   Same as above         Procedures:   Exploratory laparotomy, extensive lysis of adhesions, takedown of jejunostomy, jejunocolic anastomosis, colonic lavage, open cholecystectomy, primary repair of ventral hernia, biopsy of mesenteric mass, debridement of abdominal wall including skin and subcutaneous tissue for 20 x 10 cm and complex layered closure.          Non-operative procedures:   None performed          Consultations:   OCCUPATIONAL THERAPY ADULT IP CONSULT  PHYSICAL THERAPY ADULT IP CONSULT  SPIRITUAL HEALTH SERVICES IP CONSULT  VASCULAR ACCESS CARE ADULT IP CONSULT  VASCULAR ACCESS CARE ADULT IP CONSULT  INTERVENTIONAL RADIOLOGY IP CONSULT  VASCULAR ACCESS ADULT IP CONSULT  PHARMACY/NUTRITION TO START AND MANAGE TPN  PHARMACY IP CONSULT             Medications Prior to Admission:     Prescriptions prior to admission   Medication Sig Dispense Refill Last Dose     omeprazole (PRILOSEC) 20 MG CR capsule Take 20 mg by mouth every morning   1/24/2017 at 0800     LORazepam (ATIVAN) 0.5 MG tablet Take 0.5 mg by mouth as needed   1/25/2017 at 0500     acetaminophen (TYLENOL) 325 MG tablet Take 325 mg by mouth as needed   Past Week at Unknown time     LEVOTHYROXINE SODIUM PO Take 275 mcg by mouth every morning    1/25/2017 at 0500     Diphenoxylate-Atropine (LOMOTIL PO) Take 2 tablets by mouth as needed    1/24/2017 at 2200     0.9% sodium chloride infusion Inject 10 mLs into the vein every 8 hours   1/23/2017 at 2200     magnesium sulfate  500 mg/mL injection as needed   1/23/2017 at 2200     0.9% sodium chloride infusion Inject 1,000 mLs into the vein daily as needed (added Magnesim, 500 mg IV by pharamist.)   1/23/2017 at 2200     Ondansetron HCl (ZOFRAN PO) Take 4 mg by mouth every 6 hours as needed    More than a month at Unknown time            Discharge Medications:        Review of your medicines      START taking       Dose / Directions    meropenem 500 MG vial   Commonly known as:  MERREM   Indication:  Urinary Tract Infection   Used for:  Small bowel anastomotic leak        Dose:  500 mg   Inject 500 mg into the vein every 6 hours   Quantity:  560 mL   Refills:  0       oxyCODONE 5 MG IR tablet   Commonly known as:  ROXICODONE   Used for:  Ventral hernia without obstruction or gangrene        Dose:  5-10 mg   Take 1-2 tablets (5-10 mg) by mouth every 3 hours as needed for moderate to severe pain   Quantity:  60 tablet   Refills:  0       parenteral nutrition - PTA/DISCHARGE ORDER   Used for:  Ventral hernia without obstruction or gangrene        The TPN formula will print on the After Visit Summary Report.   Quantity:  1 each   Refills:  0         CONTINUE these medicines which have NOT CHANGED       Dose / Directions    acetaminophen 325 MG tablet   Commonly known as:  TYLENOL   Used for:  VVF (vesicovaginal fistula)        Dose:  325 mg   Take 325 mg by mouth as needed   Refills:  0       LEVOTHYROXINE SODIUM PO        Dose:  275 mcg   Take 275 mcg by mouth every morning   Refills:  0       LOMOTIL PO        Dose:  2 tablet   Take 2 tablets by mouth as needed   Refills:  0       LORazepam 0.5 MG tablet   Commonly known as:  ATIVAN   Used for:  VVF (vesicovaginal fistula)        Dose:  0.5 mg   Take 0.5 mg by mouth as needed   Refills:  0       magnesium sulfate 500 mg/mL injection   Used for:  VVF (vesicovaginal fistula)        as needed   Refills:  0       * NaCl 0.9 % infusion   Indication:  Fluid and Electrolyte Disturbance, may take  "every day, typical is every other day.        Dose:  1000 mL   Inject 1,000 mLs into the vein daily as needed (added Magnesim, 500 mg IV by pharamist.)   Refills:  0       * NaCl 0.9 % infusion   Used for:  VVF (vesicovaginal fistula)        Dose:  10 mL   Inject 10 mLs into the vein every 8 hours   Refills:  0       omeprazole 20 MG CR capsule   Commonly known as:  priLOSEC   Used for:  VVF (vesicovaginal fistula)        Dose:  20 mg   Take 20 mg by mouth every morning   Refills:  0       ZOFRAN PO        Dose:  4 mg   Take 4 mg by mouth every 6 hours as needed   Refills:  0       * Notice:  This list has 2 medication(s) that are the same as other medications prescribed for you. Read the directions carefully, and ask your doctor or other care provider to review them with you.         Where to get your medicines      Some of these will need a paper prescription and others can be bought over the counter. Ask your nurse if you have questions.     Bring a paper prescription for each of these medications    - meropenem 500 MG vial  - oxyCODONE 5 MG IR tablet  - parenteral nutrition - PTA/DISCHARGE ORDER                  Day of Discharge Exam   /65 mmHg  Pulse 76  Temp(Src) 96.1  F (35.6  C) (Oral)  Resp 16  Ht 1.651 m (5' 5\")  Wt 103.692 kg (228 lb 9.6 oz)  BMI 38.04 kg/m2  SpO2 95%  General: Awake, alert, NAD  Cardio: RRR  Chest: NLB on RA  Abd: Soft, non-distended, appropriately TTP, incision c/d/i  Ext: WWP          Brief History of Illness:   Tammy Borges is a 56-year-old female who underwent a radical cystectomy for squamous cell cancer and creation of an [place] pouch many years ago.  Approximately 3 years ago she developed a small-bowel obstruction requiring surgery.  This was complicated by an anastomotic leak and a wound infection requiring several operative interventions.  Following that she presented to my clinic last year with a high output jejunostomy, was putting out approximately 5 liters per " day and a large ventral hernia.  At the time we discussed ways of decreasing stoma output.  She was initially noncompliant with these recommendations.  However, over time she did make dietary changes and medication changes such that we were able to get her stoma output down to less than 2 liters per day and she was at a point where her nutritional status was much improved.          She presents today for a takedown of the jejunostomy and repair of her ventral hernia.  She had undergone preoperative imaging including upper GI small bowel follow-through and a Gastrografin enema to determine how much bowel there was and whether there was any evidence of obstruction.  The patient also has a history of what might be a vesicovaginal fistula and this was investigated by Dr. Alejandro at the time of surgery.  She also has a history of acute cholecystitis which was treated with antibiotics and has had some issues with right upper quadrant crampy abdominal pain consistent with biliary colic as well.  Plan is to remove her gallbladder at this time as well.               Hospital Course:   Postoperatively the patient was transferred to the general floor for further cares.  She developed a UTI treated with meropenem. During their hospital admission she had persistent leukocytosis postop found to be due to a contained anastomotic leak that on serial imaging seemed to be improving. Additionally a hepatic abscess was found on imaging near the gallbladder fossa requiring IR drainage. For these conditions she will remain on meropenem IV for 2 weeks. She was tolerating a low residue diet however given these findings the decision was to make her NPO and start TPN and to reassess her symptoms in 1 week. On discharge     On 02/10/2017, they were felt to meet discharge criteria and were discharged to home with appropriate instructions and follow up.  They had full return of bowel and bladder function, and were ambulating independently.  The  patient acknowledged understanding and were in agreement with the plan.         Antibiotics Prescribed at Discharge:   IV Meropenem for 2 weeks         Imaging Studies:   None performed  Results for orders placed or performed during the hospital encounter of 01/25/17   XR Abdomen Port 1 View    Narrative    XR ABDOMEN PORT F1 VW  1/26/2017 12:20 PM      HISTORY: NGT placement    COMPARISON: CT abdomen 1/23/2017    FINDINGS: Portable view of the abdomen supine. Nasogastric tube in  place in the stomach. There is a large amount of air in the stomach.  Nonobstructive bowel gas pattern residual barium contrast in the  transverse and descending colon. Multiple surgical clips in the  abdomen and pelvis. No pneumatosis, free air, portal venous gas. The  visualized osseous and soft tissue structures are unremarkable.      Impression    IMPRESSION: NG tube in good position.    I have personally reviewed the examination and initial interpretation  and I agree with the findings.    SAMANTHA AVILEZ MD   XR Chest Port 1 View    Narrative    Exam:  XR CHEST PORT 1 VW, 1/26/2017 4:46 PM    History: check PICC line placement    Comparison:  Abdominal radiograph, same day and outside CT, 7/23/2015    Findings:  There is a left PICC with tip over the brachiocephalic  vein. An enteric tube with tip below the field of view is similar to  previous. The cardiac silhouette and pulmonary vessels are within  normal limits. Low lung volumes. There are bibasilar opacities, right  slightly greater than left. No pleural effusion or pneumothorax.      Impression    Impression :   1. Left PICC with tip over the left brachiocephalic vein.  2. Low lung volumes with bibasilar atelectasis versus pneumonia    I have personally reviewed the examination and initial interpretation  and I agree with the findings.    GINA ROSENBAUM MD   CT Abdomen Pelvis w Contrast    Narrative    EXAMINATION: CT ABDOMEN PELVIS W CONTRAST, 2/2/2017 6:34  PM    TECHNIQUE:  Helical CT images from the lung bases through the  symphysis pubis were obtained  with IV contrast. Contrast dose:  iopamidol (ISOVUE-370) solution 135 mL    COMPARISON: Outside pelvic CT 1/23/2017. Outside abdominal CT  7/23/2015. Reason fluoroscopic studies for correlation as well.    HISTORY: Persistent elevated WBC s/p takedown jejunostomy with  jejunocolic anastomosis 1/30/17    FINDINGS:  Postsurgical changes of side to side jejunocolic anastomosis, with the  point of anastomosis over the right upper quadrant of the abdomen  involving the mid to proximal transverse colon. Fat stranding is seen  in this region, as well as over the ventral abdominal incision. Just  below the transverse colon, on series 5 image 168 is a partially  rim-enhancing fluid collection, measuring 6.1 x 3.1 cm, which is  within the region of mesenteric inflammation. Small pockets of fluid  interposed within the mesentery as well as at the inferior edge of the  right lobe of the liver.    Mild diffuse dilatation of the remaining colon Which is fluid-filled  distally. Dilatation of the small bowel, measuring up to 5 cm in some  regions.    Postsurgical changes of prior recent cholecystectomy performed at the  same time as the jejunocolic anastomosis. Surgical clips in the  gallbladder fossa. Within the gallbladder fossa is a homogeneous fluid  collection measuring 4.9 x 5.2 x 4.2 cm. No appreciable rim  enhancement. No adjacent inflammatory changes of the liver parenchyma.    The common bile duct is normal in caliber. Normal pancreas. Spleen and  adrenal glands are unremarkable. Marked atrophy of the right kidney.  Lobular appearance of the left kidney with multiple regions of  cortical scarring. No hydronephrosis. No focal renal mass. Tiny foci  of air identified within both renal pelves likely related to open  communication to the outside via [place] pouch. [place] pouch  visualized in the right pelvis exiting at the  right abdomen.  Postsurgical changes of cystectomy and hysterectomy. The left and  anterior to the [place] pouch, posterior to the rectus abdominis, is a  thick-walled fluid collection seen on series 5 image 451 Jeff  measures approximately 3.4 x 1.8 cm. On prior cystogram of 1/23/2017,  this area did opacify with contrast. This collection has been present  since 2014.    Over the ventral abdominal wall near midline incision is a simple  appearing fluid collection, measuring 15 cm in height, likely  postoperative seroma. Tiny focus of air at the peritoneal-anterior  body wall interface, for example on sagittal image series 4 image 79.    Opacities in the lower lungs, likely atelectasis. The visualized  airways are patent. No pleural effusion.    Degenerative change of the lower lumbar spine. Otherwise no suspicious  lesions. Major vascular structures of the abdomen are unremarkable.    Retroperitoneal lymphadenopathy, for example a 1.9 x 1.6 cm lymph node  seen on sagittal image series 4 image 70.      Impression    IMPRESSION:   1. Postsurgical changes from side to side jejunocolonic anastomosis  with rim-enhancing fluid collection and surrounding inflammatory  change near the anastomosis, concerning for infection and possible  developing abscess.  2. Well-defined simple fluid attenuating collection at the gallbladder  fossa possibly seroma versus biloma..  3. Inflammatory change about the ventral abdominal wall incision with  simple appearing subcutaneous fluid, likely postsurgical seroma.  4. Post surgical changes of cystectomy and [place] pouch urinary  diversion. Unchanged size of chronic collection anterior and lateral  to the [place] pouch the retropubic region.  5. Diffuse dilatation of the small and large bowel likely adynamic  ileus.  6. Retroperitoneal lymphadenopathy, may be related to infection or  surgery.    I have personally reviewed the examination and initial interpretation  and I agree with the  findings.    CHRISTOPHER HARDIN MD   XR Chest Port 1 View    Narrative    Exam:  XR CHEST PORT 1 VW, 2/3/2017 10:03 AM    History: check PICC line palcement    Comparison:  Chest radiograph from 1/26/2017.    Findings:  Single AP view of the chest is obtained. Interval enteric  tube removal. Left PICC tip projects over the left innominate  vein/innominate confluence. Cardiomediastinal silhouette is within  normal limits. The trachea is midline. Low lung volumes. Persistent  right greater than left opacities representing atelectasis versus  consolidation. The visualized upper abdomen is unremarkable.      Impression    Impression:    1. Left PICC tip projects over the left innominate vein/innominate  confluence.  2. Increased right greater than left bibasilar opacities representing  atelectasis versus consolidation. Low lung volumes.    I have personally reviewed the examination and initial interpretation  and I agree with the findings.    JESSICA LAMB MD   CT Abdomen Pelvis w Contrast    Narrative    Examination:  CT ABDOMEN PELVIS W CONTRAST 2/7/2017 2:52 PM     History: Possible abscess. 57 yo F s/p ex lap, cholecystectomy,  jejunostomy takedown, jejunocolic anastomosis and ventral hernia  repair on 1/25. Urine culture growing Klebsiella pneumoniae ESBL,  sensitivities pending. Previous radical cystectomy, [place] pouch  creation.    Comparison: CT abdomen pelvis 2 February, 2017.    Technique: CT of the abdomen and pelvis were obtained 135 mL Isovue  370 intravenous contrast. The patient also received oral contrast.  Sagittal and coronal reconstructions created and reviewed.    Findings: 6 cm round fluid collection with some internal fat  attenuation superiorly (series 4 image 32) may be intrahepatic or  subcapsular, previously 4.5 cm. This slightly effaces the duodenal  bulb. 4 mm hyperattenuating subcapsular area in the right hepatic lobe  (series 2 image 63) is unchanged. The common bile duct caliber is  not  dilated, 7 mm in the marti hepatis (series 4 image 45 and pancreatic  head (series 4 image 48).    Cholecystectomy. Jejunocolic anastomosis in the upper abdomen, with  surrounding inflammatory changes and reactive lymph nodes. There is a  small focal dehiscence in the staple line (series 5 image 92, with a  localized extraluminal fluid collection 3.4 by a 6.0 by (series 5  image 97, series 4 image 21), and a focus of adjacent free peritoneal  air (series 3 image 187). Additionally, there is a tubular  extraluminal fluid collection abutting the superior wall of the  colonic side of the anastomosis 7.2 cm long axis, and 2 cm short axis  (series 4 image 22). This demonstrates rim enhancement which is  slightly more pronounced than on previous exam. No intravascular  filling defects in adjacent vessels.    Hysterectomy, radical cystectomy. Decompressed [place] pouch,  unremarkable appearance of connected catheterizable conduit in the  right lower quadrant. Unchanged fluid collection in the left anterior  pelvis adjacent to the [place] pouch. Some mild distention of the left  ureter is unchanged.    Unchanged multifocal scarring in the left kidney, and significant  atrophy in the right kidney, with a nonobstructive 4 mm renal stone  lower pole of the right kidney and punctate right renal nonobstructing  stone in the upper pole.    4 x 6 x 2.5 cm nonencapsulated fluid associated with the superior  aspect of the midline abdominal incision. Inferiorly, there is an  additional more organized fluid collection predominantly on the left  side of the midline incision, an 11 x 7 x 1.6 cm (coronal image 9 and  13 best show these findings). Both are increased since the previous  exam.  Moderate dependent anasarca.     The pancreas, adrenal glands, and spleen are unremarkable. No bowel  obstruction.    Lower thorax: Areas of subsegmental atelectasis or consolidation in  both lower lobes. Small left pleural effusion.    Bones:  No suspicious bone or soft tissue lesions.      Impression    Impression: Findings suspicious for dehiscence of jejunocolic  anastomosis. Growth/maturation of rim-enhancing 7 cm fluid collection  just superior to the anastomosis, and 3 cm fluid collection abutting  the ventral abdominal wall since CT 5 days prior. These are suspicious  for abscesses.    Growth of 6 cm intrahepatic versus subcapsular fluid collection  (abscess or biloma). Slight increased size of relatively simple  appearing 4 cm and 11 cm fluid collections within the superior and  inferior aspects of the midline surgical incision over the same time,  potentially representing seromas.    Preliminary findings discussed with the surgical service by Dr. Lawson  at 1547 hours 7 February, 2017.    I have personally reviewed the examination and initial interpretation  and I agree with the findings.    CHRISTOPHER HARDIN MD   IR Peritoneal Abscess Drainage    Narrative    Procedure: Ultrasound-guided IR PERITONEAL ABSCESS DRAINAGE 2/8/2017  3:40 PM    Comparison: 2/7/2017 CT, 2/2/2017 CT    History: biloma vs liver abscess    Radiologist: Carl Henry    Procedure: Urgent and verbal informed consent was obtained. Patient  was placed on continuous monitoring by nursing staff. Patient remained  stable throughout the procedure.    Patient's right upper quadrant was prepped and draped in the usual  sterile fashion. Skin overlying the subhepatic fluid collection  located just inferior to the right lobe of the liver was anesthetized  with 1% lidocaine, approximately 8 cc. A skin neck was made with a #11  scalpel. Under ultrasound guidance, a 5 [place] 10 cm Yueh catheter was  inserted into the abscess with return of opaque, yellow material. Yueh  was exchanged for a 12 [place] locking pigtail drainage catheter over a  0.035 Bentson wire. Approximately 100 cc of pus was aspirated from the  cavity with near complete collapse visualized under  ultrasound.  Drainage catheter was secured using a single 2-0 nylon suture and a  sterile dressing applied.    Medication: 50 mcg fentanyl, 1 mg Versed, 8 cc 1% lidocaine    Sedation time: 25 minutes      Impression    Impression: Successful ultrasound-guided placement of a 12 [place]  pigtail catheter into a right subhepatic abscess. Sample sent for  laboratory analysis. Record drain outputs and flush with 10 cc sterile  saline every 8 hours.    PLAN:  Patient returned to the floor to recover.    I, ROE MANDUJANO MD, attest that I was present for all critical  portions of the procedure and was immediately available to provide  guidance and assistance during the remainder of the procedure.     I have personally reviewed the examination and initial interpretation  and I agree with the findings.    ROE MANDUJANO MD            Final Pathology Result:   A: Omentum nodule   B: Nodule, omentum   C: Duodenal lymph node   D: J-jujostomy margin   E: Gallbladder and contents   F: Omentum resection   G: Hernia sac     FINAL DIAGNOSIS:   A. Soft tissue, omentum, nodule, excision:   - Fibroadipose tissue with foreign material and foreign body giant cell   reaction   - Negative for malignancy     B. Soft tissue, omentum, nodule, excision:   - Fibroadipose tissue with adherent foreign material and foreign body   giant cell reaction   - Negative for malignancy     C. Lymph node, adis-duodenal, excision:   - Lymph node replaced with exuberant foreign body giant cell reaction   and foreign material   - Negative for malignancy     D. Small intestine, jejunum, J-jejunostomy margin, excision:   - Ulcerated skin and colonic mucosa with granulation tissue consistent   with anastomosis   - Negative for dysplasia or malignancy     E. Gallbladder and contents, cholecystectomy:   - Acute cholecystitis with focal ulcerations and cholelithiasis     F. Soft tissue, omentum, resection:   - Fibroadipose tissue with foreign material and  surrounding   granulomatous reaction, acute inflammation, and fat necrosis   - Negative for malignancy     G. Soft tissue, hernia sac, excision:   - Fibroadipose tissue with acute inflammatory infiltrate   - Negative for malignancy            Discharge Instructions and Follow-Up:     Discharge Procedure Orders  Physical Therapy Referral   Referral Type: Rehab Therapy Physical Therapy     Home infusion referral     Reason for your hospital stay   Order Comments: Ventral hernia without obstruction or gangrene  (primary encounter diagnosis)  Small bowel anastomotic leak     Follow Up and recommended labs and tests   Order Comments: - Follow-up as needed in clinic in 2 weeks with MD Jorge L. If your surgeon is not available in this time-frame you might see one of their partners. You should be contacted by a nurse within 3 business days to check how you are doing. If you are not contacted please call RN care coordinator: Gina Kevin [phone].     Activity   Order Comments: Your activity upon discharge: no lifting greater than 10 lbs for 4 weeks   Order Specific Question Answer Comments   Is discharge order? Yes      Tubes and drains   Order Comments: You are going home with the following tubes or drains: BOB.  Follow these instructions  to care for your tube    Flush twice daily with 10cc sterile saline     Diet   Order Comments: Follow this diet upon discharge:  NPO   Order Specific Question Answer Comments   Is discharge order? Yes                Home Health Care:   Arranged           Discharge Disposition:   Discharged to home      Condition at discharge: Stable    Ricardo Leung   Surgery PGY2  [phone]

## 2017-02-10 NOTE — PROGRESS NOTES
Care Coordinator- Discharge Planning     Admission Date/Time:  1/25/2017  Attending MD:  Alban Coats MD     Data  Date of initial CC assessment: 1/31/2017  Chart reviewed, discussed with interdisciplinary team.   Patient was admitted for:   1. Ventral hernia without obstruction or gangrene    2. Small bowel anastomotic leak         Assessment  Full assessment completed in previous note    Per MD team patient medically ready for discharge to home.  Patient will discharge with home TPN and IV merrem q6h, [facility] pharmacy to supply patient medications and educate patient on home TPN.  Discharge orders faxed to [facility] health pharmacist (P: [phone], F: [phone]) and updated them that patient would be leaving today.  Patient has a f/u appointment with Dr. Coats scheduled for 3/6.  Per Dr. Leung team will be contacting patient over the phone to coordinate follow up care.  Patient has a flight back to [facility] at 1530 and will take a cab ride to the airport.  Patient plans on picking up her TPN and IV antibiotics on the way home from the airport.  Patient has an appointment 2/16 to have her labs drawn and PICC cares done.  Patient has a abdominal drain that will need to be flushed twice a day, bedside nurse completed education with patient regarding this.  Will f/u with primary care in the next week and with Dr. Coats 3/6.          Plan  Anticipated Discharge Date: 2/10/2017  Anticipated Discharge Plan:  Home on home TPN and IV antibiotics, Outpatient PT    CTS Handoff completed:  YES    Thais Martinez, COTYCC  [phone]

## 2017-02-10 NOTE — PLAN OF CARE
Problem: Goal Outcome Summary  Goal: Goal Outcome Summary  Outcome: Adequate for Discharge Date Met:  02/10/17  Physical Therapy Discharge Summary    Reason for therapy discharge:    Discharged to home.    Progress towards therapy goal(s). See goals on Care Plan in [facility] electronic health record for goal details.  Goals partially met.  Barriers to achieving goals:   discharge from facility.    Therapy recommendation(s):    Continue home exercise program.  Recommend home with assist as needed to maintain abdominal precautions

## 2017-02-10 NOTE — PROGRESS NOTES
"[facility]  \"Hello, my name is Jing Farnsworth , and I am calling from the [facility].  I want to check in and see how you are doing, after leaving the hospital.  You may also receive a call from your Care Coordinator (care team), but I want to make sure you don t have any urgent needs.  I have a couple questions to review with you:     Post-Discharge Outreach                                                    Tammy Borges is a 56 year old female     Follow-up Appointments      Follow Up and recommended labs and tests        - Follow-up as needed in clinic in 2 weeks with MD Jorge L. If your surgeon is not available in this time-frame you might see one of their partners. You should be contacted by a nurse within 3 business days to check how you are doing. If you are not contacted please call RN care coordinator: Ginadino Kevin [phone].                      Your next 10 appointments already scheduled      Mar 06, 2017  8:30 AM   (Arrive by 8:15 AM)   Post-Op with Aman Collier DO   [facility] and [facility] ([facility])      [address] [place] [id number] [phone]               Mar 06, 2017  1:30 PM   (Arrive by 1:15 PM)   Post-Op with Alban Coats MD   [facility] General Surgery ([facility])              Care Team:    Patient Care Team       Relationship Specialty Notifications Start End    Leonard Stovall MD, MD PCP - General Internal Medicine  11/13/15     Phone: [phone] Fax: [phone]         [facility] [address] [place] [id number]    Saqib Alejandro MD MD Urology  1/11/17     Phone: [phone] Fax: [phone]          [facility] [address] [place] [address] [address] [facility] [address]    Aman Collier DO MD Urology  1/11/17     Phone: [phone] Fax: [phone]         [facility] [address] [place] [address] SE " [facility] [address]    Balbina Colon, RN Registered Nurse Urology  1/11/17     Phone: [phone] Pager: [phone]        Leonard Stovall MD, MD Referring Physician Internal Medicine  1/11/17     Phone: [phone] Fax: [phone]         [facility] [address] [place] [id number]            Transition of Care Review                                                        Gina Kevin, RN   Physician Assistant - Surgical        Tammy Borges is a patient of Dr. Alban Coats that underwent a abdominal surgery with recent discharge from the hospital. Attempted to contact patient via telephone for a status update and review post op teaching. LM on  to call office. Await return call.      Of note:  Following at [facility] ([facility]) for PICC line care and labs  NPO/TPN  IV abx  Restrictions: No lifting in excess of 10 lbs x 4 weeks  ? Drains  Meds: Oxycodone/Zofran     Post op appointment 3/6/17 at 1:30 PM with Dr. Jorge L Fuentes Called patient first to follow up after dc from hospital. No need for further follow up with patient.         Plan                                                      Thanks for your time.  Your Care Coordinator may follow-up within the next couple days.  In the meantime if you have questions, concerns or problems call your care team.        Jing Farnsworth

## 2017-02-10 NOTE — PLAN OF CARE
Problem: Goal Outcome Summary  Goal: Goal Outcome Summary  Outcome: No Change  Vital signs:  Temp: 96.3  F (35.7  C) Temp src: Oral BP: 100/66 mmHg Pulse: 78   Resp: 18 SpO2: 97 % O2 Device: None (Room air)   Pt is A&Ox4. AVSS. Pt c/o abdominal pain. Oxycodone prn with adequate relief. TPN and lipids infusing per order via PICC. BG 73 and 91. MD paged around 1519. New order placed to reduce IVF to 50 ml/hr. Pt reported slight nausea, no emesis, Zofran x1 with relief. Loose stool x3. Pt self caths neobladder good output. Merrem given per order. Midline with staples c/d/i, RUSTY. RUQ drain irrigated with small amount SS drainage. Up ad cosmo. Pt appeared to be resting between cares. Pt is able to verbalize needs. Plan for possible d/c today. Continue POC.

## 2017-02-12 LAB
BACTERIA SPEC CULT: ABNORMAL
MICRO REPORT STATUS: ABNORMAL
MICROORGANISM SPEC CULT: ABNORMAL
MICROORGANISM SPEC CULT: ABNORMAL
SPECIMEN SOURCE: ABNORMAL

## 2017-02-13 ENCOUNTER — CARE COORDINATION (OUTPATIENT)
Dept: SURGERY | Facility: CLINIC | Age: 57
End: 2017-02-13

## 2017-02-13 DIAGNOSIS — R11.0 NAUSEA: Primary | ICD-10-CM

## 2017-02-13 RX ORDER — ONDANSETRON 4 MG/1
4 TABLET, FILM COATED ORAL EVERY 6 HOURS PRN
Qty: 18 TABLET | Refills: 0 | Status: SHIPPED | OUTPATIENT
Start: 2017-02-13

## 2017-02-13 NOTE — PROGRESS NOTES
RN Post Op Care Coordination Note    Ms. Tammy Borges is a 56 year old female who underwent abdominal surgery with recent discharge from the hospital.  Spoke with Patient.    Support  Patient able to care for self independently     Health Status  Fevers/chills: Patient denies any fever or chills.  Nausea/Vomiting: Intermittent nausea.  No emesis.  Patient requesting refill of Zofran.  Eating/drinking: NPO except sips with medications.  TPN- another 4 days.  Patient is requesting a shorter cycle as it is currently 24 hours.  She would like a couple of hours without a pump.  Spoke with Pharmacist at [facility] and states that cycle can be shortened but is discouraging it.  Bowel habits: Patient reports having a normal bowel movement.  Urinary: Voiding normally  Drains (BOB): Drain patent.  Flushing with NS 10 ml daily.  Measuring output.  Incisions: Patient denies any signs and symptoms of infection.  Pain: Discomfort improving.  Pain well managed and patient is discussing weaning off of narcotics.    Activity/Restrictions  Following restrictions outlined by surgeon.    Equipment  other None    All of her questions were answered.  She will call this office if she has any further questions and/or concerns.      Follow up visit confirmed with Dr. Coats 3/6/17.    Whom and When to Call  Patient acknowledges understanding of how to manage any medication changes and when to seek medical care.     Patient advised that if after hour medical concerns arise to please call [phone] and choose option 4 to speak to the physician on call.

## 2017-02-13 NOTE — PROGRESS NOTES
Tammy Borges is a patient of Dr. Alban Coats that underwent a abdominal surgery with recent discharge from the hospital.  Attempted to contact patient via telephone for a status update and review post op teaching.  LM on VM to call office.  Await return call.      Of note:  Following at [facility] ([facility]) for PICC line care and labs  NPO/TPN  IV abx  Restrictions: No lifting in excess of 10 lbs x 4 weeks  ? Drains  Meds:  Oxycodone/Zofran    Post op appointment 3/6/17 at 1:30 PM with Dr. Coats

## 2017-02-15 LAB
BACTERIA SPEC CULT: NORMAL
Lab: NORMAL
MICRO REPORT STATUS: NORMAL
SPECIMEN SOURCE: NORMAL

## 2017-02-22 LAB
FUNGUS SPEC CULT: NORMAL
MICRO REPORT STATUS: NORMAL
SPECIMEN SOURCE: NORMAL

## 2017-02-27 ENCOUNTER — PRE VISIT (OUTPATIENT)
Dept: UROLOGY | Facility: CLINIC | Age: 57
End: 2017-02-27

## 2017-02-27 NOTE — TELEPHONE ENCOUNTER
Mobilization of [place] pouch 1/25/17 surgical follow up. No labs or imaging needed. No need to call patient

## 2017-03-03 ENCOUNTER — TELEPHONE (OUTPATIENT)
Dept: SURGERY | Facility: CLINIC | Age: 57
End: 2017-03-03

## 2017-03-03 NOTE — TELEPHONE ENCOUNTER
Established Patient Telephone Reminder Call    Date of call:  03/03/17  Phone numbers:  Home number on file [phone] (home)    Reached patient/confirmed appointment:  No - left message:   on voicemail  Appointment with:   Dr. Alban Coats  Reason for visit:  Po

## 2017-03-22 LAB
MICRO REPORT STATUS: NORMAL
MYCOBACTERIUM SPEC CULT: NORMAL
SPECIMEN SOURCE: NORMAL

## 2017-12-31 ENCOUNTER — HEALTH MAINTENANCE LETTER (OUTPATIENT)
Age: 57
End: 2017-12-31

## 2020-03-03 NOTE — PLAN OF CARE
1. Have you been to the ER, urgent care clinic since your last visit? Hospitalized since your last visit? No    2. Have you seen or consulted any other health care providers outside of the [address] [place] [address] since your last visit? Include any pap smears or colon screening.  No Problem: Goal Outcome Summary  Goal: Goal Outcome Summary  AVSS, oral pain med for abdomen discomfort adequate. Tolerating Low Fiber diet. Soft-diarrhea stools resolving, + flatus. Will do self bladder irrigation later as needed. Abdomen incision with staples healing. Oral potassium for level 3.2. Continues on IV antibiotics.

## 2020-03-10 ENCOUNTER — HEALTH MAINTENANCE LETTER (OUTPATIENT)
Age: 60
End: 2020-03-10

## 2020-12-27 ENCOUNTER — HEALTH MAINTENANCE LETTER (OUTPATIENT)
Age: 60
End: 2020-12-27

## 2021-04-24 ENCOUNTER — HEALTH MAINTENANCE LETTER (OUTPATIENT)
Age: 61
End: 2021-04-24

## 2021-10-04 ENCOUNTER — HEALTH MAINTENANCE LETTER (OUTPATIENT)
Age: 61
End: 2021-10-04

## 2022-03-20 ENCOUNTER — HEALTH MAINTENANCE LETTER (OUTPATIENT)
Age: 62
End: 2022-03-20

## 2022-05-15 ENCOUNTER — HEALTH MAINTENANCE LETTER (OUTPATIENT)
Age: 62
End: 2022-05-15

## 2022-09-11 ENCOUNTER — HEALTH MAINTENANCE LETTER (OUTPATIENT)
Age: 62
End: 2022-09-11

## 2023-06-03 ENCOUNTER — HEALTH MAINTENANCE LETTER (OUTPATIENT)
Age: 63
End: 2023-06-03

## (undated) DEVICE — APPLICATOR COTTON TIP 6"X2 STERILE LF 6012

## (undated) DEVICE — WIPES FOLEY CARE SURESTEP PROVON DFC100

## (undated) DEVICE — DRSG TELFA 3X8" 1238

## (undated) DEVICE — DRAPE MAYO STAND 23X54 8337

## (undated) DEVICE — DRSG TEGADERM 4X4 3/4" 1626W

## (undated) DEVICE — SOL NACL 0.9% IRRIG 1000ML BOTTLE 2F7124

## (undated) DEVICE — SU VICRYL 3-0 SH 27" J316H

## (undated) DEVICE — ESU PENCIL W/COATED BLADE E2450H

## (undated) DEVICE — CLIP HORIZON MED BLUE 002200

## (undated) DEVICE — STPL RELOAD 80 X 3.8MM GIA8038L

## (undated) DEVICE — SPONGE LAP 18X18" X8435

## (undated) DEVICE — NDL COUNTER 20CT 31142493

## (undated) DEVICE — PREP POVIDONE IODINE SCRUB 7.5% 120ML

## (undated) DEVICE — Device

## (undated) DEVICE — SU SILK 0 TIE 6X30" A306H

## (undated) DEVICE — SU PDS II 1 CTX 36" Z371T

## (undated) DEVICE — TEST TUBE W/SCREW CAP 17361

## (undated) DEVICE — ESU GROUND PAD ADULT W/CORD E7507

## (undated) DEVICE — SPONGE KITTNER 30-101

## (undated) DEVICE — PAD CHUX UNDERPAD 30X30"

## (undated) DEVICE — SOL WATER IRRIG 1000ML BOTTLE 2F7114

## (undated) DEVICE — DRSG PRIMAPORE 02X3" 7133

## (undated) DEVICE — DRAPE C-ARM W/STRAPS 42X72" 07-CA104

## (undated) DEVICE — SU SILK 3-0 SH CR 8X18" C013D

## (undated) DEVICE — DRSG GAUZE 4X4" TRAY 6939

## (undated) DEVICE — ESU LIGASURE IMPACT OPEN SEALER/DVDR CVD LG JAW LF4418

## (undated) DEVICE — SU PDS II 1 TP-1 54" Z879G

## (undated) DEVICE — SU SILK 3-0 TIE 12X30" A304H

## (undated) DEVICE — SYR 70ML TOOMEY 041170

## (undated) DEVICE — SU VICRYL 0 CTX 36" J370H

## (undated) DEVICE — SU VICRYL 3-0 SH 27" UND J416H

## (undated) DEVICE — STPL 80 X 3.8MM GIA8038S

## (undated) DEVICE — PITCHER STERILE 1000ML  SSK9004A

## (undated) DEVICE — SU SILK 4-0 TIE 12X30" A303H

## (undated) DEVICE — DRAPE IOBAN INCISE 13X13" 6640EZ

## (undated) DEVICE — NDL BLUNT 15GA 1.5"

## (undated) DEVICE — SYR PISTON URETHRAL 60ML 68000

## (undated) DEVICE — GOWN IMPERVIOUS BREATHABLE SMART LG 89015

## (undated) DEVICE — SU MONOCRYL 4-0 PS-2 27" UND Y426H

## (undated) DEVICE — PREP CHLORAPREP 26ML TINTED ORANGE  260815

## (undated) DEVICE — PREP POVIDONE IODINE SOLUTION 10% 120ML

## (undated) DEVICE — SU DERMABOND ADVANCED .7ML DNX12

## (undated) DEVICE — SU PDS II 3-0 SH 27" Z316H

## (undated) DEVICE — SU SILK 3-0 SH 30" K832H

## (undated) DEVICE — DRSG PRIMAPORE 03 1/8X6" 66000318

## (undated) DEVICE — SU SILK 2-0 TIE 12X30" A305H

## (undated) DEVICE — SU PROLENE 1 CTX 30" 8455H

## (undated) DEVICE — BLADE KNIFE SURG 11 371111

## (undated) DEVICE — BASIN EMESIS STERILE  SSK9005A

## (undated) DEVICE — SU VICRYL 0 CT-1 27" UND J260H

## (undated) DEVICE — BLADE CLIPPER SGL USE 9680

## (undated) DEVICE — CLIP HORIZON SM RED WIDE SLOT 001201

## (undated) RX ORDER — FENTANYL CITRATE 50 UG/ML
INJECTION, SOLUTION INTRAMUSCULAR; INTRAVENOUS
Status: DISPENSED
Start: 2017-01-25

## (undated) RX ORDER — MINERAL OIL
OIL (ML) MISCELLANEOUS
Status: DISPENSED
Start: 2017-01-25

## (undated) RX ORDER — MORPHINE SULFATE 2 MG/ML
INJECTION, SOLUTION INTRAMUSCULAR; INTRAVENOUS
Status: DISPENSED
Start: 2017-01-25

## (undated) RX ORDER — HYDROMORPHONE HYDROCHLORIDE 1 MG/ML
INJECTION, SOLUTION INTRAMUSCULAR; INTRAVENOUS; SUBCUTANEOUS
Status: DISPENSED
Start: 2017-01-25

## (undated) RX ORDER — AMPICILLIN AND SULBACTAM 2; 1 G/1; G/1
INJECTION, POWDER, FOR SOLUTION INTRAMUSCULAR; INTRAVENOUS
Status: DISPENSED
Start: 2017-02-08

## (undated) RX ORDER — FENTANYL CITRATE 50 UG/ML
INJECTION, SOLUTION INTRAMUSCULAR; INTRAVENOUS
Status: DISPENSED
Start: 2017-02-08

## (undated) RX ORDER — HYDRALAZINE HYDROCHLORIDE 20 MG/ML
INJECTION INTRAMUSCULAR; INTRAVENOUS
Status: DISPENSED
Start: 2017-01-25